# Patient Record
Sex: FEMALE | Race: WHITE | Employment: OTHER | URBAN - METROPOLITAN AREA
[De-identification: names, ages, dates, MRNs, and addresses within clinical notes are randomized per-mention and may not be internally consistent; named-entity substitution may affect disease eponyms.]

---

## 2017-01-09 ENCOUNTER — OFFICE VISIT (OUTPATIENT)
Dept: FAMILY MEDICINE CLINIC | Age: 82
End: 2017-01-09

## 2017-01-09 VITALS
HEART RATE: 87 BPM | DIASTOLIC BLOOD PRESSURE: 68 MMHG | BODY MASS INDEX: 24.3 KG/M2 | SYSTOLIC BLOOD PRESSURE: 120 MMHG | TEMPERATURE: 98.4 F | WEIGHT: 151.2 LBS | RESPIRATION RATE: 18 BRPM | HEIGHT: 66 IN

## 2017-01-09 DIAGNOSIS — E78.2 MIXED HYPERLIPIDEMIA: ICD-10-CM

## 2017-01-09 DIAGNOSIS — R73.9 BLOOD GLUCOSE ELEVATED: ICD-10-CM

## 2017-01-09 DIAGNOSIS — J45.30 MILD PERSISTENT ASTHMA WITHOUT COMPLICATION: ICD-10-CM

## 2017-01-09 DIAGNOSIS — I35.0 AORTIC VALVE STENOSIS, UNSPECIFIED ETIOLOGY: ICD-10-CM

## 2017-01-09 DIAGNOSIS — I10 ESSENTIAL HYPERTENSION: Primary | ICD-10-CM

## 2017-01-09 PROCEDURE — G8420 CALC BMI NORM PARAMETERS: HCPCS | Performed by: FAMILY MEDICINE

## 2017-01-09 PROCEDURE — 1123F ACP DISCUSS/DSCN MKR DOCD: CPT | Performed by: FAMILY MEDICINE

## 2017-01-09 PROCEDURE — G8484 FLU IMMUNIZE NO ADMIN: HCPCS | Performed by: FAMILY MEDICINE

## 2017-01-09 PROCEDURE — 99214 OFFICE O/P EST MOD 30 MIN: CPT | Performed by: FAMILY MEDICINE

## 2017-01-09 PROCEDURE — G8427 DOCREV CUR MEDS BY ELIG CLIN: HCPCS | Performed by: FAMILY MEDICINE

## 2017-01-09 PROCEDURE — 4040F PNEUMOC VAC/ADMIN/RCVD: CPT | Performed by: FAMILY MEDICINE

## 2017-01-09 PROCEDURE — G8399 PT W/DXA RESULTS DOCUMENT: HCPCS | Performed by: FAMILY MEDICINE

## 2017-01-09 PROCEDURE — 1036F TOBACCO NON-USER: CPT | Performed by: FAMILY MEDICINE

## 2017-01-09 PROCEDURE — 1090F PRES/ABSN URINE INCON ASSESS: CPT | Performed by: FAMILY MEDICINE

## 2017-03-23 RX ORDER — DOXAZOSIN 8 MG/1
8 TABLET ORAL NIGHTLY
Qty: 90 TABLET | Refills: 3 | Status: SHIPPED | OUTPATIENT
Start: 2017-03-23 | End: 2018-02-14 | Stop reason: SDUPTHER

## 2017-03-23 RX ORDER — MONTELUKAST SODIUM 10 MG/1
10 TABLET ORAL NIGHTLY
Qty: 90 TABLET | Refills: 3 | Status: SHIPPED | OUTPATIENT
Start: 2017-03-23 | End: 2018-02-13 | Stop reason: SDUPTHER

## 2017-04-10 ENCOUNTER — OFFICE VISIT (OUTPATIENT)
Dept: FAMILY MEDICINE CLINIC | Age: 82
End: 2017-04-10

## 2017-04-10 VITALS
SYSTOLIC BLOOD PRESSURE: 120 MMHG | DIASTOLIC BLOOD PRESSURE: 68 MMHG | TEMPERATURE: 98.7 F | BODY MASS INDEX: 23.63 KG/M2 | HEART RATE: 61 BPM | HEIGHT: 66 IN | WEIGHT: 147 LBS | RESPIRATION RATE: 16 BRPM

## 2017-04-10 DIAGNOSIS — I10 ESSENTIAL HYPERTENSION: Primary | ICD-10-CM

## 2017-04-10 DIAGNOSIS — E78.2 MIXED HYPERLIPIDEMIA: ICD-10-CM

## 2017-04-10 DIAGNOSIS — I35.0 AORTIC VALVE STENOSIS, UNSPECIFIED ETIOLOGY: ICD-10-CM

## 2017-04-10 DIAGNOSIS — J45.30 MILD PERSISTENT ASTHMA WITHOUT COMPLICATION: ICD-10-CM

## 2017-04-10 PROCEDURE — 99214 OFFICE O/P EST MOD 30 MIN: CPT | Performed by: FAMILY MEDICINE

## 2017-04-10 PROCEDURE — 1090F PRES/ABSN URINE INCON ASSESS: CPT | Performed by: FAMILY MEDICINE

## 2017-04-10 PROCEDURE — 1123F ACP DISCUSS/DSCN MKR DOCD: CPT | Performed by: FAMILY MEDICINE

## 2017-04-10 PROCEDURE — 4040F PNEUMOC VAC/ADMIN/RCVD: CPT | Performed by: FAMILY MEDICINE

## 2017-04-10 PROCEDURE — G8427 DOCREV CUR MEDS BY ELIG CLIN: HCPCS | Performed by: FAMILY MEDICINE

## 2017-04-10 PROCEDURE — 1036F TOBACCO NON-USER: CPT | Performed by: FAMILY MEDICINE

## 2017-04-10 PROCEDURE — G8420 CALC BMI NORM PARAMETERS: HCPCS | Performed by: FAMILY MEDICINE

## 2017-04-10 PROCEDURE — G8399 PT W/DXA RESULTS DOCUMENT: HCPCS | Performed by: FAMILY MEDICINE

## 2017-07-05 DIAGNOSIS — I10 ESSENTIAL HYPERTENSION: ICD-10-CM

## 2017-07-05 DIAGNOSIS — I35.0 AORTIC VALVE STENOSIS, UNSPECIFIED ETIOLOGY: ICD-10-CM

## 2017-07-05 DIAGNOSIS — E78.2 MIXED HYPERLIPIDEMIA: ICD-10-CM

## 2017-07-05 DIAGNOSIS — J45.30 MILD PERSISTENT ASTHMA WITHOUT COMPLICATION: ICD-10-CM

## 2017-07-05 LAB
ALBUMIN SERPL-MCNC: 4.3 G/DL (ref 3.9–4.9)
ALP BLD-CCNC: 82 U/L (ref 40–130)
ALT SERPL-CCNC: 13 U/L (ref 0–33)
ANION GAP SERPL CALCULATED.3IONS-SCNC: 17 MEQ/L (ref 7–13)
AST SERPL-CCNC: 19 U/L (ref 0–35)
BILIRUB SERPL-MCNC: 0.2 MG/DL (ref 0–1.2)
BUN BLDV-MCNC: 37 MG/DL (ref 8–23)
CALCIUM SERPL-MCNC: 9 MG/DL (ref 8.6–10.2)
CHLORIDE BLD-SCNC: 104 MEQ/L (ref 98–107)
CHOLESTEROL, TOTAL: 275 MG/DL (ref 0–199)
CO2: 20 MEQ/L (ref 22–29)
CREAT SERPL-MCNC: 1.18 MG/DL (ref 0.5–0.9)
GFR AFRICAN AMERICAN: 53.3
GFR NON-AFRICAN AMERICAN: 44
GLOBULIN: 2.4 G/DL (ref 2.3–3.5)
GLUCOSE BLD-MCNC: 101 MG/DL (ref 74–109)
HDLC SERPL-MCNC: 49 MG/DL (ref 40–59)
LDL CHOLESTEROL CALCULATED: 199 MG/DL (ref 0–129)
POTASSIUM SERPL-SCNC: 4.1 MEQ/L (ref 3.5–5.1)
SODIUM BLD-SCNC: 141 MEQ/L (ref 132–144)
TOTAL PROTEIN: 6.7 G/DL (ref 6.4–8.1)
TRIGL SERPL-MCNC: 136 MG/DL (ref 0–200)

## 2017-07-11 ENCOUNTER — OFFICE VISIT (OUTPATIENT)
Dept: FAMILY MEDICINE CLINIC | Age: 82
End: 2017-07-11

## 2017-07-11 VITALS
RESPIRATION RATE: 14 BRPM | DIASTOLIC BLOOD PRESSURE: 58 MMHG | SYSTOLIC BLOOD PRESSURE: 126 MMHG | BODY MASS INDEX: 23.63 KG/M2 | WEIGHT: 147 LBS | HEIGHT: 66 IN | HEART RATE: 80 BPM

## 2017-07-11 DIAGNOSIS — R73.9 BLOOD GLUCOSE ELEVATED: ICD-10-CM

## 2017-07-11 DIAGNOSIS — J45.30 MILD PERSISTENT ASTHMA WITHOUT COMPLICATION: ICD-10-CM

## 2017-07-11 DIAGNOSIS — I10 ESSENTIAL HYPERTENSION: Primary | ICD-10-CM

## 2017-07-11 DIAGNOSIS — I35.0 AORTIC VALVE STENOSIS, UNSPECIFIED ETIOLOGY: ICD-10-CM

## 2017-07-11 DIAGNOSIS — E78.2 MIXED HYPERLIPIDEMIA: ICD-10-CM

## 2017-07-11 PROCEDURE — G8399 PT W/DXA RESULTS DOCUMENT: HCPCS | Performed by: FAMILY MEDICINE

## 2017-07-11 PROCEDURE — 4040F PNEUMOC VAC/ADMIN/RCVD: CPT | Performed by: FAMILY MEDICINE

## 2017-07-11 PROCEDURE — 1123F ACP DISCUSS/DSCN MKR DOCD: CPT | Performed by: FAMILY MEDICINE

## 2017-07-11 PROCEDURE — G8427 DOCREV CUR MEDS BY ELIG CLIN: HCPCS | Performed by: FAMILY MEDICINE

## 2017-07-11 PROCEDURE — 1090F PRES/ABSN URINE INCON ASSESS: CPT | Performed by: FAMILY MEDICINE

## 2017-07-11 PROCEDURE — G8420 CALC BMI NORM PARAMETERS: HCPCS | Performed by: FAMILY MEDICINE

## 2017-07-11 PROCEDURE — 1036F TOBACCO NON-USER: CPT | Performed by: FAMILY MEDICINE

## 2017-07-11 PROCEDURE — 99214 OFFICE O/P EST MOD 30 MIN: CPT | Performed by: FAMILY MEDICINE

## 2017-07-11 RX ORDER — ATORVASTATIN CALCIUM 20 MG/1
20 TABLET, FILM COATED ORAL DAILY
Qty: 90 TABLET | Refills: 3 | Status: SHIPPED | OUTPATIENT
Start: 2017-07-11 | End: 2017-07-11

## 2017-07-11 RX ORDER — ATORVASTATIN CALCIUM 20 MG/1
20 TABLET, FILM COATED ORAL DAILY
Qty: 90 TABLET | Refills: 3 | Status: SHIPPED | OUTPATIENT
Start: 2017-07-11 | End: 2018-07-17 | Stop reason: SDUPTHER

## 2017-08-10 RX ORDER — VALSARTAN AND HYDROCHLOROTHIAZIDE 80; 12.5 MG/1; MG/1
TABLET, FILM COATED ORAL
Qty: 90 TABLET | Refills: 2 | Status: SHIPPED | OUTPATIENT
Start: 2017-08-10 | End: 2018-04-16 | Stop reason: SDUPTHER

## 2017-10-17 ENCOUNTER — OFFICE VISIT (OUTPATIENT)
Dept: FAMILY MEDICINE CLINIC | Age: 82
End: 2017-10-17

## 2017-10-17 VITALS
SYSTOLIC BLOOD PRESSURE: 128 MMHG | BODY MASS INDEX: 23.46 KG/M2 | DIASTOLIC BLOOD PRESSURE: 60 MMHG | OXYGEN SATURATION: 98 % | WEIGHT: 146 LBS | TEMPERATURE: 97 F | RESPIRATION RATE: 16 BRPM | HEART RATE: 107 BPM | HEIGHT: 66 IN

## 2017-10-17 DIAGNOSIS — I35.0 AORTIC VALVE STENOSIS, ETIOLOGY OF CARDIAC VALVE DISEASE UNSPECIFIED: ICD-10-CM

## 2017-10-17 DIAGNOSIS — E78.2 MIXED HYPERLIPIDEMIA: ICD-10-CM

## 2017-10-17 DIAGNOSIS — I10 ESSENTIAL HYPERTENSION: Primary | ICD-10-CM

## 2017-10-17 DIAGNOSIS — J45.30 MILD PERSISTENT ASTHMA WITHOUT COMPLICATION: ICD-10-CM

## 2017-10-17 DIAGNOSIS — R73.9 BLOOD GLUCOSE ELEVATED: ICD-10-CM

## 2017-10-17 PROCEDURE — 4040F PNEUMOC VAC/ADMIN/RCVD: CPT | Performed by: FAMILY MEDICINE

## 2017-10-17 PROCEDURE — G8427 DOCREV CUR MEDS BY ELIG CLIN: HCPCS | Performed by: FAMILY MEDICINE

## 2017-10-17 PROCEDURE — 1123F ACP DISCUSS/DSCN MKR DOCD: CPT | Performed by: FAMILY MEDICINE

## 2017-10-17 PROCEDURE — G8484 FLU IMMUNIZE NO ADMIN: HCPCS | Performed by: FAMILY MEDICINE

## 2017-10-17 PROCEDURE — 1090F PRES/ABSN URINE INCON ASSESS: CPT | Performed by: FAMILY MEDICINE

## 2017-10-17 PROCEDURE — 99214 OFFICE O/P EST MOD 30 MIN: CPT | Performed by: FAMILY MEDICINE

## 2017-10-17 PROCEDURE — G8399 PT W/DXA RESULTS DOCUMENT: HCPCS | Performed by: FAMILY MEDICINE

## 2017-10-17 PROCEDURE — G8420 CALC BMI NORM PARAMETERS: HCPCS | Performed by: FAMILY MEDICINE

## 2017-10-17 PROCEDURE — 1036F TOBACCO NON-USER: CPT | Performed by: FAMILY MEDICINE

## 2017-10-17 ASSESSMENT — PATIENT HEALTH QUESTIONNAIRE - PHQ9
SUM OF ALL RESPONSES TO PHQ9 QUESTIONS 1 & 2: 0
2. FEELING DOWN, DEPRESSED OR HOPELESS: 0
1. LITTLE INTEREST OR PLEASURE IN DOING THINGS: 0
SUM OF ALL RESPONSES TO PHQ QUESTIONS 1-9: 0

## 2017-10-17 NOTE — PROGRESS NOTES
Chief Complaint   Patient presents with    Hypertension     3 mo f/u    Hyperlipidemia    Asthma   bp controlled now  No cp/sob  Watching diet  mary meds    declines flu shot    Asthma controlled now  No cough    Aortic stenosis stable  No current sx  She has current echo  no other issues    bw reviewed   hi chol  Watch diet  she would agree with resuming meds    Patient again declines Prevnar immunization    Treatment Adherence:   Medication compliance:  compliant all of the time  Diet compliance:  compliant all of the time  Weight trend: decreasing  Current exercise: walks 5 time(s) per week  Barriers: none    Hypertension:  Home blood pressure monitoring: No.  She is adherent to a low sodium diet. Patient denies chest pain, shortness of breath, headache, lightheadedness, blurred vision, peripheral edema, palpitations, dry cough and fatigue. Antihypertensive medication side effects: no medication side effects noted. Use of agents associated with hypertension: none. Sodium (mEq/L)   Date Value   07/05/2017 141    BUN (mg/dL)   Date Value   07/05/2017 37 (H)    Glucose (mg/dL)   Date Value   07/05/2017 101      Potassium (mEq/L)   Date Value   07/05/2017 4.1    CREATININE (mg/dL)   Date Value   07/05/2017 1.18 (H)         Hyperlipidemia:  No new myalgias or GI upset on none.      Lab Results   Component Value Date    CHOL 275 (H) 07/05/2017    TRIG 136 07/05/2017    HDL 49 07/05/2017    LDLCALC 199 (H) 07/05/2017     Lab Results   Component Value Date    ALT 13 07/05/2017    AST 19 07/05/2017          Patient Active Problem List   Diagnosis    Blood glucose elevated    Essential hypertension    Mixed hyperlipidemia    Mild persistent asthma without complication    Aortic valve stenosis       has a current medication list which includes the following prescription(s): valsartan-hydrochlorothiazide, atorvastatin, proair hfa, montelukast, fluticasone-salmeterol, and doxazosin. Past Medical History:   Diagnosis Date    Asthma     Blood glucose elevated     Hyperlipidemia     Hypertension        Past Surgical History:   Procedure Laterality Date    HYSTERECTOMY  1988    complete       [unfilled]    family history includes Cancer in her maternal grandmother; Heart Disease in her brother; High Blood Pressure in her mother. Social History     Social History    Marital status:      Spouse name: N/A    Number of children: N/A    Years of education: N/A     Occupational History    Not on file. Social History Main Topics    Smoking status: Former Smoker    Smokeless tobacco: Never Used    Alcohol use No    Drug use: No    Sexual activity: Not on file     Other Topics Concern    Not on file     Social History Narrative    No narrative on file       Allergies   Allergen Reactions    Capoten [Captopril]     Influenza Vaccines Nausea And Vomiting       Review of Systems - General ROS: negative  Psychological ROS: negative  ENT ROS: negative  Hematological and Lymphatic ROS: negative  Endocrine ROS: negative  Respiratory ROS: no cough, shortness of breath, or wheezing  Cardiovascular ROS: no chest pain or dyspnea on exertion  Gastrointestinal ROS: no abdominal pain, change in bowel habits, or black or bloody stools  Genito-Urinary ROS: no dysuria, trouble voiding, or hematuria  Musculoskeletal ROS: negative  Neurological ROS: no TIA or stroke symptoms  Dermatological ROS: negative    Blood pressure 128/60, pulse 107, temperature 97 °F (36.1 °C), temperature source Temporal, resp. rate 16, height 5' 6\" (1.676 m), weight 146 lb (66.2 kg), SpO2 98 %, not currently breastfeeding.     Physical Examination: General appearance - alert, well appearing, and in no distress  Mental status - alert, oriented to person, place, and time  Eyes - pupils equal and reactive, extraocular eye movements intact  Ears - bilateral TM's and external ear canals normal  Mouth - mucous

## 2017-12-29 NOTE — TELEPHONE ENCOUNTER
Phazackary REQUESTING REFILL. ORDER PENDED.  THANK YOU.    LOV-10/17/2017    Future Appointments  Date Time Provider Sigifredo Foster   1/16/2018 10:45 AM MD Santhosh Rudolph [facility] EMERGENCY [facility] AT [place]

## 2018-01-16 ENCOUNTER — OFFICE VISIT (OUTPATIENT)
Dept: FAMILY MEDICINE CLINIC | Age: 83
End: 2018-01-16

## 2018-01-16 VITALS
SYSTOLIC BLOOD PRESSURE: 118 MMHG | HEART RATE: 118 BPM | TEMPERATURE: 97.6 F | HEIGHT: 68 IN | DIASTOLIC BLOOD PRESSURE: 64 MMHG | OXYGEN SATURATION: 97 % | BODY MASS INDEX: 21.98 KG/M2 | WEIGHT: 145 LBS | RESPIRATION RATE: 16 BRPM

## 2018-01-16 DIAGNOSIS — I10 ESSENTIAL HYPERTENSION: Primary | ICD-10-CM

## 2018-01-16 DIAGNOSIS — R73.9 BLOOD GLUCOSE ELEVATED: ICD-10-CM

## 2018-01-16 DIAGNOSIS — J45.30 MILD PERSISTENT ASTHMA WITHOUT COMPLICATION: ICD-10-CM

## 2018-01-16 DIAGNOSIS — E78.2 MIXED HYPERLIPIDEMIA: ICD-10-CM

## 2018-01-16 PROCEDURE — G8420 CALC BMI NORM PARAMETERS: HCPCS | Performed by: FAMILY MEDICINE

## 2018-01-16 PROCEDURE — G8427 DOCREV CUR MEDS BY ELIG CLIN: HCPCS | Performed by: FAMILY MEDICINE

## 2018-01-16 PROCEDURE — G8484 FLU IMMUNIZE NO ADMIN: HCPCS | Performed by: FAMILY MEDICINE

## 2018-01-16 PROCEDURE — 4040F PNEUMOC VAC/ADMIN/RCVD: CPT | Performed by: FAMILY MEDICINE

## 2018-01-16 PROCEDURE — 1036F TOBACCO NON-USER: CPT | Performed by: FAMILY MEDICINE

## 2018-01-16 PROCEDURE — 99214 OFFICE O/P EST MOD 30 MIN: CPT | Performed by: FAMILY MEDICINE

## 2018-01-16 PROCEDURE — 1123F ACP DISCUSS/DSCN MKR DOCD: CPT | Performed by: FAMILY MEDICINE

## 2018-01-16 PROCEDURE — 1090F PRES/ABSN URINE INCON ASSESS: CPT | Performed by: FAMILY MEDICINE

## 2018-01-16 PROCEDURE — G8399 PT W/DXA RESULTS DOCUMENT: HCPCS | Performed by: FAMILY MEDICINE

## 2018-01-16 NOTE — PROGRESS NOTES
fluticasone-salmeterol, and doxazosin. Past Medical History:   Diagnosis Date    Asthma     Blood glucose elevated     Hyperlipidemia     Hypertension        Past Surgical History:   Procedure Laterality Date    HYSTERECTOMY  1988    complete       [unfilled]    family history includes Cancer in her maternal grandmother; Heart Disease in her brother; High Blood Pressure in her mother. Social History     Social History    Marital status:      Spouse name: N/A    Number of children: N/A    Years of education: N/A     Occupational History    Not on file. Social History Main Topics    Smoking status: Former Smoker    Smokeless tobacco: Never Used    Alcohol use No    Drug use: No    Sexual activity: Not on file     Other Topics Concern    Not on file     Social History Narrative    No narrative on file       Allergies   Allergen Reactions    Capoten [Captopril]     Influenza Vaccines Nausea And Vomiting       Review of Systems - General ROS: negative  Psychological ROS: negative  ENT ROS: negative  Hematological and Lymphatic ROS: negative  Endocrine ROS: negative  Respiratory ROS: no cough, shortness of breath, or wheezing  Cardiovascular ROS: no chest pain or dyspnea on exertion  Gastrointestinal ROS: no abdominal pain, change in bowel habits, or black or bloody stools  Genito-Urinary ROS: no dysuria, trouble voiding, or hematuria  Musculoskeletal ROS: negative  Neurological ROS: no TIA or stroke symptoms  Dermatological ROS: negative    Blood pressure 118/64, pulse 118, temperature 97.6 °F (36.4 °C), temperature source Temporal, resp. rate 16, height 5' 8\" (1.727 m), weight 145 lb (65.8 kg), SpO2 97 %, not currently breastfeeding.     Physical Examination: General appearance - alert, well appearing, and in no distress  Mental status - alert, oriented to person, place, and time  Eyes - pupils equal and reactive, extraocular eye movements intact  Ears - bilateral TM's and external ear canals normal  Mouth - mucous membranes moist, pharynx normal without lesions  Neck - supple, no significant adenopathy  Lymphatics - no palpable lymphadenopathy, no hepatosplenomegaly  Chest - clear to auscultation, no wheezes, rales or rhonchi, symmetric air entry  Heart - normal rate, regular rhythm, normal S1, S2, + KARYN from AS, o/w murmurs, rubs, clicks or gallops  Abdomen - soft, nontender, nondistended, no masses or organomegaly  Neurological - alert, oriented, normal speech, no focal findings or movement disorder noted  Musculoskeletal - no joint tenderness, deformity or swelling  Skin - no rash    Assessment:    1. Essential hypertension     2. Mixed hyperlipidemia     3. Mild persistent asthma without complication     4. Blood glucose elevated         Plan:      No orders of the defined types were placed in this encounter. Outpatient Encounter Prescriptions as of 1/16/2018   Medication Sig Dispense Refill    PROAIR  (90 Base) MCG/ACT inhaler INHALE 2 PUFFS INTO THE LUNGS EVERY 6 HOURS AS NEEDED FOR WHEEZING 25.5 g 1    valsartan-hydrochlorothiazide (DIOVAN-HCT) 80-12.5 MG per tablet TAKE 1 TABLET DAILY 90 tablet 2    atorvastatin (LIPITOR) 20 MG tablet Take 1 tablet by mouth daily 90 tablet 3    montelukast (SINGULAIR) 10 MG tablet Take 1 tablet by mouth nightly 90 tablet 3    fluticasone-salmeterol (ADVAIR DISKUS) 500-50 MCG/DOSE diskus inhaler Inhale 1 puff into the lungs every 12 hours 3 Inhaler 3    doxazosin (CARDURA) 8 MG tablet Take 1 tablet by mouth nightly 90 tablet 3     No facility-administered encounter medications on file as of 1/16/2018. Recommend lifestyle modification. Return in about 3 months (around 4/16/2018). Patient education provided. They understand and agree with this course of treatment. They will return with new or worsening symptoms. Patient instructed to remain current with appropriate annual health maintenance.

## 2018-01-16 NOTE — PATIENT INSTRUCTIONS
Patient Education        High Blood Pressure: Care Instructions  Your Care Instructions    If your blood pressure is usually above 140/90, you have high blood pressure, or hypertension. That means the top number is 140 or higher or the bottom number is 90 or higher, or both. Despite what a lot of people think, high blood pressure usually doesn't cause headaches or make you feel dizzy or lightheaded. It usually has no symptoms. But it does increase your risk for heart attack, stroke, and kidney or eye damage. The higher your blood pressure, the more your risk increases. Your doctor will give you a goal for your blood pressure. Your goal will be based on your health and your age. An example of a goal is to keep your blood pressure below 140/90. Lifestyle changes, such as eating healthy and being active, are always important to help lower blood pressure. You might also take medicine to reach your blood pressure goal.  Follow-up care is a key part of your treatment and safety. Be sure to make and go to all appointments, and call your doctor if you are having problems. It's also a good idea to know your test results and keep a list of the medicines you take. How can you care for yourself at home? Medical treatment  · If you stop taking your medicine, your blood pressure will go back up. You may take one or more types of medicine to lower your blood pressure. Be safe with medicines. Take your medicine exactly as prescribed. Call your doctor if you think you are having a problem with your medicine. · Talk to your doctor before you start taking aspirin every day. Aspirin can help certain people lower their risk of a heart attack or stroke. But taking aspirin isn't right for everyone, because it can cause serious bleeding. · See your doctor regularly. You may need to see the doctor more often at first or until your blood pressure comes down.   · If you are taking blood pressure medicine, talk to your doctor before arms.  ¨ Lightheadedness or sudden weakness. ¨ A fast or irregular heartbeat. ? · You have symptoms of a stroke. These may include:  ¨ Sudden numbness, tingling, weakness, or loss of movement in your face, arm, or leg, especially on only one side of your body. ¨ Sudden vision changes. ¨ Sudden trouble speaking. ¨ Sudden confusion or trouble understanding simple statements. ¨ Sudden problems with walking or balance. ¨ A sudden, severe headache that is different from past headaches. ? · You have severe back or belly pain. ?Do not wait until your blood pressure comes down on its own. Get help right away. ?Call your doctor now or seek immediate care if:  ? · Your blood pressure is much higher than normal (such as 180/110 or higher), but you don't have symptoms. ? · You think high blood pressure is causing symptoms, such as:  ¨ Severe headache. ¨ Blurry vision. ? Watch closely for changes in your health, and be sure to contact your doctor if:  ? · Your blood pressure measures 140/90 or higher at least 2 times. That means the top number is 140 or higher or the bottom number is 90 or higher, or both. ? · You think you may be having side effects from your blood pressure medicine. ? · Your blood pressure is usually normal, but it goes above normal at least 2 times. Where can you learn more? Go to [email or web address]. org and sign in to your [employer] account. Enter A455 in the One[employer] box to learn more about \"High Blood Pressure: Care Instructions. \"     If you do not have an account, please click on the \"Sign Up Now\" link. Current as of: Daina 10, 2017  Content Version: 11.5  © 0744-5778 [employer]. Care instructions adapted under license by [facility] ([facility]).  If you have questions about a medical condition or this instruction, always ask your healthcare professional. Nasir Mixon any warranty or liability for your use of this information.

## 2018-02-15 RX ORDER — MONTELUKAST SODIUM 10 MG/1
10 TABLET ORAL NIGHTLY
Qty: 90 TABLET | Refills: 3 | Status: SHIPPED | OUTPATIENT
Start: 2018-02-15 | End: 2018-12-20 | Stop reason: SDUPTHER

## 2018-02-15 RX ORDER — DOXAZOSIN 8 MG/1
8 TABLET ORAL NIGHTLY
Qty: 90 TABLET | Refills: 3 | Status: SHIPPED | OUTPATIENT
Start: 2018-02-15 | End: 2018-12-31 | Stop reason: SDUPTHER

## 2018-04-16 ENCOUNTER — OFFICE VISIT (OUTPATIENT)
Dept: FAMILY MEDICINE CLINIC | Age: 83
End: 2018-04-16
Payer: MEDICARE

## 2018-04-16 VITALS
RESPIRATION RATE: 16 BRPM | SYSTOLIC BLOOD PRESSURE: 130 MMHG | BODY MASS INDEX: 21.98 KG/M2 | WEIGHT: 145 LBS | HEART RATE: 72 BPM | DIASTOLIC BLOOD PRESSURE: 70 MMHG | TEMPERATURE: 97.8 F | HEIGHT: 68 IN

## 2018-04-16 DIAGNOSIS — J45.30 MILD PERSISTENT ASTHMA WITHOUT COMPLICATION: ICD-10-CM

## 2018-04-16 DIAGNOSIS — E78.2 MIXED HYPERLIPIDEMIA: ICD-10-CM

## 2018-04-16 DIAGNOSIS — I35.0 AORTIC VALVE STENOSIS, ETIOLOGY OF CARDIAC VALVE DISEASE UNSPECIFIED: ICD-10-CM

## 2018-04-16 DIAGNOSIS — R73.9 BLOOD GLUCOSE ELEVATED: ICD-10-CM

## 2018-04-16 DIAGNOSIS — I10 ESSENTIAL HYPERTENSION: Primary | ICD-10-CM

## 2018-04-16 PROCEDURE — G8420 CALC BMI NORM PARAMETERS: HCPCS | Performed by: FAMILY MEDICINE

## 2018-04-16 PROCEDURE — G8427 DOCREV CUR MEDS BY ELIG CLIN: HCPCS | Performed by: FAMILY MEDICINE

## 2018-04-16 PROCEDURE — 1036F TOBACCO NON-USER: CPT | Performed by: FAMILY MEDICINE

## 2018-04-16 PROCEDURE — 4040F PNEUMOC VAC/ADMIN/RCVD: CPT | Performed by: FAMILY MEDICINE

## 2018-04-16 PROCEDURE — 1090F PRES/ABSN URINE INCON ASSESS: CPT | Performed by: FAMILY MEDICINE

## 2018-04-16 PROCEDURE — 1123F ACP DISCUSS/DSCN MKR DOCD: CPT | Performed by: FAMILY MEDICINE

## 2018-04-16 PROCEDURE — G8399 PT W/DXA RESULTS DOCUMENT: HCPCS | Performed by: FAMILY MEDICINE

## 2018-04-16 PROCEDURE — 99214 OFFICE O/P EST MOD 30 MIN: CPT | Performed by: FAMILY MEDICINE

## 2018-04-16 RX ORDER — VALSARTAN AND HYDROCHLOROTHIAZIDE 80; 12.5 MG/1; MG/1
TABLET, FILM COATED ORAL
Qty: 90 TABLET | Refills: 3 | Status: SHIPPED | OUTPATIENT
Start: 2018-04-16

## 2018-07-17 ENCOUNTER — OFFICE VISIT (OUTPATIENT)
Dept: FAMILY MEDICINE CLINIC | Age: 83
End: 2018-07-17
Payer: MEDICARE

## 2018-07-17 VITALS
HEIGHT: 66 IN | DIASTOLIC BLOOD PRESSURE: 64 MMHG | OXYGEN SATURATION: 96 % | TEMPERATURE: 98.2 F | SYSTOLIC BLOOD PRESSURE: 136 MMHG | RESPIRATION RATE: 12 BRPM | HEART RATE: 102 BPM | WEIGHT: 145 LBS | BODY MASS INDEX: 23.3 KG/M2

## 2018-07-17 DIAGNOSIS — I10 ESSENTIAL HYPERTENSION: Primary | ICD-10-CM

## 2018-07-17 DIAGNOSIS — I35.0 AORTIC VALVE STENOSIS, ETIOLOGY OF CARDIAC VALVE DISEASE UNSPECIFIED: ICD-10-CM

## 2018-07-17 DIAGNOSIS — R73.9 BLOOD GLUCOSE ELEVATED: ICD-10-CM

## 2018-07-17 DIAGNOSIS — E78.2 MIXED HYPERLIPIDEMIA: ICD-10-CM

## 2018-07-17 DIAGNOSIS — J45.30 MILD PERSISTENT ASTHMA WITHOUT COMPLICATION: ICD-10-CM

## 2018-07-17 DIAGNOSIS — I10 ESSENTIAL HYPERTENSION: ICD-10-CM

## 2018-07-17 LAB
ALBUMIN SERPL-MCNC: 4.3 G/DL (ref 3.9–4.9)
ALP BLD-CCNC: 87 U/L (ref 40–130)
ALT SERPL-CCNC: 17 U/L (ref 0–33)
ANION GAP SERPL CALCULATED.3IONS-SCNC: 14 MEQ/L (ref 7–13)
AST SERPL-CCNC: 20 U/L (ref 0–35)
BILIRUB SERPL-MCNC: 0.3 MG/DL (ref 0–1.2)
BUN BLDV-MCNC: 32 MG/DL (ref 8–23)
CALCIUM SERPL-MCNC: 9.2 MG/DL (ref 8.6–10.2)
CHLORIDE BLD-SCNC: 107 MEQ/L (ref 98–107)
CHOLESTEROL, TOTAL: 174 MG/DL (ref 0–199)
CO2: 24 MEQ/L (ref 22–29)
CREAT SERPL-MCNC: 1.22 MG/DL (ref 0.5–0.9)
GFR AFRICAN AMERICAN: 51.1
GFR NON-AFRICAN AMERICAN: 42.3
GLOBULIN: 2.4 G/DL (ref 2.3–3.5)
GLUCOSE BLD-MCNC: 107 MG/DL (ref 74–109)
HDLC SERPL-MCNC: 46 MG/DL (ref 40–59)
LDL CHOLESTEROL CALCULATED: 107 MG/DL (ref 0–129)
POTASSIUM SERPL-SCNC: 5.2 MEQ/L (ref 3.5–5.1)
SODIUM BLD-SCNC: 145 MEQ/L (ref 132–144)
TOTAL PROTEIN: 6.7 G/DL (ref 6.4–8.1)
TRIGL SERPL-MCNC: 104 MG/DL (ref 0–200)

## 2018-07-17 PROCEDURE — 1101F PT FALLS ASSESS-DOCD LE1/YR: CPT | Performed by: FAMILY MEDICINE

## 2018-07-17 PROCEDURE — 1090F PRES/ABSN URINE INCON ASSESS: CPT | Performed by: FAMILY MEDICINE

## 2018-07-17 PROCEDURE — 1123F ACP DISCUSS/DSCN MKR DOCD: CPT | Performed by: FAMILY MEDICINE

## 2018-07-17 PROCEDURE — 99214 OFFICE O/P EST MOD 30 MIN: CPT | Performed by: FAMILY MEDICINE

## 2018-07-17 PROCEDURE — G8420 CALC BMI NORM PARAMETERS: HCPCS | Performed by: FAMILY MEDICINE

## 2018-07-17 PROCEDURE — 4040F PNEUMOC VAC/ADMIN/RCVD: CPT | Performed by: FAMILY MEDICINE

## 2018-07-17 PROCEDURE — G8427 DOCREV CUR MEDS BY ELIG CLIN: HCPCS | Performed by: FAMILY MEDICINE

## 2018-07-17 PROCEDURE — 1036F TOBACCO NON-USER: CPT | Performed by: FAMILY MEDICINE

## 2018-07-17 PROCEDURE — G8399 PT W/DXA RESULTS DOCUMENT: HCPCS | Performed by: FAMILY MEDICINE

## 2018-07-17 RX ORDER — ATORVASTATIN CALCIUM 20 MG/1
20 TABLET, FILM COATED ORAL DAILY
Qty: 90 TABLET | Refills: 3 | Status: SHIPPED | OUTPATIENT
Start: 2018-07-17

## 2018-07-17 NOTE — PROGRESS NOTES
Chief Complaint   Patient presents with    Hypertension     3 mo f/u    Medication Refill   bp controlled at this time  No cp/sob  Watching diet  mary meds at this timeWhich has been helpful    Asthma controlled at this time  No cough or shortness of breath reported  No wheezing noted    Aortic stenosis has been stable  No current sx  She has current echo  no other issues  Recommend keep cardiology evaluation, patient agrees and has been compliant with this    bw reviewed at this time  hi chol  Watch diet  she would agree with continuing meds    Treatment Adherence:   Medication compliance:  compliant all of the time  Diet compliance:  compliant all of the time  Weight trend: decreasing  Current exercise: walks 5 time(s) per week  Barriers: none    Hypertension:  Home blood pressure monitoring: No.  She is adherent to a low sodium diet. Patient denies chest pain, shortness of breath, headache, lightheadedness, blurred vision, peripheral edema, palpitations, dry cough and fatigue. Antihypertensive medication side effects: no medication side effects noted. Use of agents associated with hypertension: none. Sodium (mEq/L)   Date Value   07/17/2018 145 (H)    BUN (mg/dL)   Date Value   07/17/2018 32 (H)    Glucose (mg/dL)   Date Value   07/17/2018 107      Potassium (mEq/L)   Date Value   07/17/2018 5.2 (H)    CREATININE (mg/dL)   Date Value   07/17/2018 1.22 (H)         Hyperlipidemia:  No new myalgias or GI upset on none.      Lab Results   Component Value Date    CHOL 174 07/17/2018    TRIG 104 07/17/2018    HDL 46 07/17/2018    LDLCALC 107 07/17/2018     Lab Results   Component Value Date    ALT 17 07/17/2018    AST 20 07/17/2018          Patient Active Problem List   Diagnosis    Blood glucose elevated    Essential hypertension    Mixed hyperlipidemia    Mild persistent asthma without complication    Aortic valve stenosis       has a current medication list which includes the following prescription(s): atorvastatin, valsartan-hydrochlorothiazide, montelukast, fluticasone-salmeterol, doxazosin, and proair hfa. Past Medical History:   Diagnosis Date    Asthma     Blood glucose elevated     Hyperlipidemia     Hypertension        Past Surgical History:   Procedure Laterality Date    HYSTERECTOMY  1988    complete       [unfilled]    family history includes Cancer in her maternal grandmother; Heart Disease in her brother; High Blood Pressure in her mother. Social History     Social History    Marital status:      Spouse name: N/A    Number of children: N/A    Years of education: N/A     Occupational History    Not on file. Social History Main Topics    Smoking status: Former Smoker    Smokeless tobacco: Never Used    Alcohol use No    Drug use: No    Sexual activity: Not on file     Other Topics Concern    Not on file     Social History Narrative    No narrative on file       Allergies   Allergen Reactions    Capoten [Captopril]     Influenza Vaccines Nausea And Vomiting       Review of Systems - General ROS: negative  Psychological ROS: negative  ENT ROS: negative  Hematological and Lymphatic ROS: negative  Endocrine ROS: negative  Respiratory ROS: no cough, shortness of breath, or wheezing  Cardiovascular ROS: no chest pain or dyspnea on exertion  Gastrointestinal ROS: no abdominal pain, change in bowel habits, or black or bloody stools  Genito-Urinary ROS: no dysuria, trouble voiding, or hematuria  Musculoskeletal ROS: negative  Neurological ROS: no TIA or stroke symptoms  Dermatological ROS: negative    Blood pressure 136/64, pulse 102, temperature 98.2 °F (36.8 °C), temperature source Temporal, resp. rate 12, height 5' 6\" (1.676 m), weight 145 lb (65.8 kg), SpO2 96 %, not currently breastfeeding.     Physical Examination: General appearance - alert, well appearing, and in no distress  Mental status - alert, oriented to person, place, and

## 2018-10-26 ENCOUNTER — OFFICE VISIT (OUTPATIENT)
Dept: FAMILY MEDICINE CLINIC | Age: 83
End: 2018-10-26
Payer: MEDICARE

## 2018-10-26 VITALS
SYSTOLIC BLOOD PRESSURE: 120 MMHG | HEIGHT: 67 IN | TEMPERATURE: 97.8 F | RESPIRATION RATE: 14 BRPM | HEART RATE: 87 BPM | OXYGEN SATURATION: 99 % | DIASTOLIC BLOOD PRESSURE: 58 MMHG | BODY MASS INDEX: 22 KG/M2 | WEIGHT: 140.2 LBS

## 2018-10-26 DIAGNOSIS — R73.9 BLOOD GLUCOSE ELEVATED: ICD-10-CM

## 2018-10-26 DIAGNOSIS — E78.2 MIXED HYPERLIPIDEMIA: ICD-10-CM

## 2018-10-26 DIAGNOSIS — N18.30 CHRONIC KIDNEY DISEASE, STAGE III (MODERATE) (HCC): ICD-10-CM

## 2018-10-26 DIAGNOSIS — I35.0 AORTIC VALVE STENOSIS, ETIOLOGY OF CARDIAC VALVE DISEASE UNSPECIFIED: ICD-10-CM

## 2018-10-26 DIAGNOSIS — J45.30 MILD PERSISTENT ASTHMA WITHOUT COMPLICATION: ICD-10-CM

## 2018-10-26 DIAGNOSIS — I10 ESSENTIAL HYPERTENSION: Primary | ICD-10-CM

## 2018-10-26 PROCEDURE — G8399 PT W/DXA RESULTS DOCUMENT: HCPCS | Performed by: FAMILY MEDICINE

## 2018-10-26 PROCEDURE — 1090F PRES/ABSN URINE INCON ASSESS: CPT | Performed by: FAMILY MEDICINE

## 2018-10-26 PROCEDURE — 99214 OFFICE O/P EST MOD 30 MIN: CPT | Performed by: FAMILY MEDICINE

## 2018-10-26 PROCEDURE — G8420 CALC BMI NORM PARAMETERS: HCPCS | Performed by: FAMILY MEDICINE

## 2018-10-26 PROCEDURE — 1036F TOBACCO NON-USER: CPT | Performed by: FAMILY MEDICINE

## 2018-10-26 PROCEDURE — 1101F PT FALLS ASSESS-DOCD LE1/YR: CPT | Performed by: FAMILY MEDICINE

## 2018-10-26 PROCEDURE — 1123F ACP DISCUSS/DSCN MKR DOCD: CPT | Performed by: FAMILY MEDICINE

## 2018-10-26 PROCEDURE — G8427 DOCREV CUR MEDS BY ELIG CLIN: HCPCS | Performed by: FAMILY MEDICINE

## 2018-10-26 PROCEDURE — 4040F PNEUMOC VAC/ADMIN/RCVD: CPT | Performed by: FAMILY MEDICINE

## 2018-10-26 PROCEDURE — G8484 FLU IMMUNIZE NO ADMIN: HCPCS | Performed by: FAMILY MEDICINE

## 2018-10-26 ASSESSMENT — PATIENT HEALTH QUESTIONNAIRE - PHQ9
SUM OF ALL RESPONSES TO PHQ QUESTIONS 1-9: 0
SUM OF ALL RESPONSES TO PHQ QUESTIONS 1-9: 0
SUM OF ALL RESPONSES TO PHQ9 QUESTIONS 1 & 2: 0
2. FEELING DOWN, DEPRESSED OR HOPELESS: 0
1. LITTLE INTEREST OR PLEASURE IN DOING THINGS: 0

## 2018-10-26 NOTE — PROGRESS NOTES
stenosis       has a current medication list which includes the following prescription(s): atorvastatin, valsartan-hydrochlorothiazide, montelukast, fluticasone-salmeterol, doxazosin, and proair hfa. Past Medical History:   Diagnosis Date    Asthma     Blood glucose elevated     Hyperlipidemia     Hypertension        Past Surgical History:   Procedure Laterality Date    HYSTERECTOMY  1988    complete       [unfilled]    family history includes Cancer in her maternal grandmother; Heart Disease in her brother; High Blood Pressure in her mother. Social History     Social History    Marital status:      Spouse name: N/A    Number of children: N/A    Years of education: N/A     Occupational History    Not on file. Social History Main Topics    Smoking status: Former Smoker    Smokeless tobacco: Never Used    Alcohol use No    Drug use: No    Sexual activity: Not on file     Other Topics Concern    Not on file     Social History Narrative    No narrative on file       Allergies   Allergen Reactions    Capoten [Captopril]     Influenza Vaccines Nausea And Vomiting       Review of Systems - General ROS: negative  Psychological ROS: negative  ENT ROS: negative  Hematological and Lymphatic ROS: negative  Endocrine ROS: negative  Respiratory ROS: no cough, shortness of breath, or wheezing  Cardiovascular ROS: no chest pain or dyspnea on exertion  Gastrointestinal ROS: no abdominal pain, change in bowel habits, or black or bloody stools  Genito-Urinary ROS: no dysuria, trouble voiding, or hematuria  Musculoskeletal ROS: negative  Neurological ROS: no TIA or stroke symptoms  Dermatological ROS: negative    Blood pressure (!) 120/58, pulse 87, temperature 97.8 °F (36.6 °C), temperature source Temporal, resp. rate 14, height 5' 6.5\" (1.689 m), weight 140 lb 3.2 oz (63.6 kg), SpO2 99 %, not currently breastfeeding.     Physical Examination: General appearance - alert, well appearing, and in no

## 2018-12-20 RX ORDER — MONTELUKAST SODIUM 10 MG/1
10 TABLET ORAL NIGHTLY
Qty: 90 TABLET | Refills: 3 | Status: SHIPPED | OUTPATIENT
Start: 2018-12-20

## 2018-12-31 RX ORDER — DOXAZOSIN 8 MG/1
8 TABLET ORAL NIGHTLY
Qty: 90 TABLET | Refills: 3 | Status: SHIPPED | OUTPATIENT
Start: 2018-12-31

## 2019-01-28 ENCOUNTER — OFFICE VISIT (OUTPATIENT)
Dept: FAMILY MEDICINE CLINIC | Age: 84
End: 2019-01-28
Payer: MEDICARE

## 2019-01-28 VITALS
HEART RATE: 92 BPM | SYSTOLIC BLOOD PRESSURE: 122 MMHG | HEIGHT: 67 IN | DIASTOLIC BLOOD PRESSURE: 66 MMHG | BODY MASS INDEX: 21.5 KG/M2 | TEMPERATURE: 97.6 F | RESPIRATION RATE: 16 BRPM | WEIGHT: 137 LBS

## 2019-01-28 DIAGNOSIS — N18.30 CHRONIC KIDNEY DISEASE, STAGE III (MODERATE) (HCC): ICD-10-CM

## 2019-01-28 DIAGNOSIS — I10 ESSENTIAL HYPERTENSION: Primary | ICD-10-CM

## 2019-01-28 DIAGNOSIS — E78.2 MIXED HYPERLIPIDEMIA: ICD-10-CM

## 2019-01-28 DIAGNOSIS — J45.30 MILD PERSISTENT ASTHMA WITHOUT COMPLICATION: ICD-10-CM

## 2019-01-28 DIAGNOSIS — I35.0 AORTIC VALVE STENOSIS, ETIOLOGY OF CARDIAC VALVE DISEASE UNSPECIFIED: ICD-10-CM

## 2019-01-28 DIAGNOSIS — R73.9 BLOOD GLUCOSE ELEVATED: ICD-10-CM

## 2019-01-28 PROCEDURE — 1090F PRES/ABSN URINE INCON ASSESS: CPT | Performed by: FAMILY MEDICINE

## 2019-01-28 PROCEDURE — G8484 FLU IMMUNIZE NO ADMIN: HCPCS | Performed by: FAMILY MEDICINE

## 2019-01-28 PROCEDURE — 1036F TOBACCO NON-USER: CPT | Performed by: FAMILY MEDICINE

## 2019-01-28 PROCEDURE — G8420 CALC BMI NORM PARAMETERS: HCPCS | Performed by: FAMILY MEDICINE

## 2019-01-28 PROCEDURE — 1123F ACP DISCUSS/DSCN MKR DOCD: CPT | Performed by: FAMILY MEDICINE

## 2019-01-28 PROCEDURE — G8399 PT W/DXA RESULTS DOCUMENT: HCPCS | Performed by: FAMILY MEDICINE

## 2019-01-28 PROCEDURE — 4040F PNEUMOC VAC/ADMIN/RCVD: CPT | Performed by: FAMILY MEDICINE

## 2019-01-28 PROCEDURE — G8427 DOCREV CUR MEDS BY ELIG CLIN: HCPCS | Performed by: FAMILY MEDICINE

## 2019-01-28 PROCEDURE — 1101F PT FALLS ASSESS-DOCD LE1/YR: CPT | Performed by: FAMILY MEDICINE

## 2019-01-28 PROCEDURE — 99214 OFFICE O/P EST MOD 30 MIN: CPT | Performed by: FAMILY MEDICINE

## 2019-02-23 ENCOUNTER — TELEPHONE (OUTPATIENT)
Dept: FAMILY MEDICINE CLINIC | Age: 84
End: 2019-02-23

## 2021-03-23 LAB — SURGICAL PATHOLOGY REPORT: NORMAL

## 2023-02-16 PROBLEM — E78.5 HYPERLIPIDEMIA: Status: ACTIVE | Noted: 2023-02-16

## 2023-02-16 PROBLEM — R41.3 POOR MEMORY: Status: ACTIVE | Noted: 2023-02-16

## 2023-02-16 PROBLEM — N18.32 CKD STAGE G3B/A2, GFR 30-44 AND ALBUMIN CREATININE RATIO 30-299 MG/G (MULTI): Status: ACTIVE | Noted: 2023-02-16

## 2023-02-16 PROBLEM — J45.909 ASTHMA (HHS-HCC): Status: ACTIVE | Noted: 2023-02-16

## 2023-02-16 PROBLEM — I95.9 HYPOTENSION: Status: ACTIVE | Noted: 2023-02-16

## 2023-02-16 PROBLEM — D64.9 ANEMIA: Status: ACTIVE | Noted: 2023-02-16

## 2023-02-16 PROBLEM — R63.4 WEIGHT LOSS: Status: ACTIVE | Noted: 2023-02-16

## 2023-02-16 PROBLEM — I34.0 SEVERE MITRAL REGURGITATION: Status: ACTIVE | Noted: 2023-02-16

## 2023-02-16 PROBLEM — R19.7 DIARRHEA: Status: ACTIVE | Noted: 2023-02-16

## 2023-02-16 PROBLEM — Z95.2 S/P TAVR (TRANSCATHETER AORTIC VALVE REPLACEMENT): Status: ACTIVE | Noted: 2023-02-16

## 2023-02-16 PROBLEM — M19.90 DJD (DEGENERATIVE JOINT DISEASE): Status: ACTIVE | Noted: 2023-02-16

## 2023-02-16 PROBLEM — I25.10 CAD (CORONARY ARTERY DISEASE): Status: ACTIVE | Noted: 2023-02-16

## 2023-02-16 PROBLEM — I10 HTN (HYPERTENSION): Status: ACTIVE | Noted: 2023-02-16

## 2023-02-16 PROBLEM — N95.2 ATROPHY OF VAGINA: Status: ACTIVE | Noted: 2023-02-16

## 2023-02-16 PROBLEM — N13.30 HYDRONEPHROSIS: Status: ACTIVE | Noted: 2023-02-16

## 2023-02-16 PROBLEM — R19.5 LOOSE STOOLS: Status: ACTIVE | Noted: 2023-02-16

## 2023-02-16 PROBLEM — N81.10 FEMALE BLADDER PROLAPSE: Status: ACTIVE | Noted: 2023-02-16

## 2023-02-16 PROBLEM — R10.9 ABDOMINAL PAIN: Status: ACTIVE | Noted: 2023-02-16

## 2023-02-16 PROBLEM — I65.23 BILATERAL CAROTID ARTERY STENOSIS: Status: ACTIVE | Noted: 2023-02-16

## 2023-02-16 RX ORDER — VALSARTAN AND HYDROCHLOROTHIAZIDE 80; 12.5 MG/1; MG/1
0.5 TABLET, FILM COATED ORAL DAILY
COMMUNITY
Start: 2020-06-04 | End: 2023-05-02

## 2023-02-16 RX ORDER — ASPIRIN 81 MG/1
81 TABLET ORAL DAILY
COMMUNITY
Start: 2021-06-14

## 2023-02-16 RX ORDER — MONTELUKAST SODIUM 10 MG/1
10 TABLET ORAL DAILY
COMMUNITY
Start: 2020-11-30 | End: 2023-10-23

## 2023-02-16 RX ORDER — ATORVASTATIN CALCIUM 20 MG/1
20 TABLET, FILM COATED ORAL NIGHTLY
COMMUNITY
Start: 2022-02-09 | End: 2024-04-23 | Stop reason: SDUPTHER

## 2023-03-01 LAB
ALANINE AMINOTRANSFERASE (SGPT) (U/L) IN SER/PLAS: 18 U/L (ref 7–45)
ALBUMIN (G/DL) IN SER/PLAS: 4 G/DL (ref 3.4–5)
ALKALINE PHOSPHATASE (U/L) IN SER/PLAS: 77 U/L (ref 33–136)
ANION GAP IN SER/PLAS: 12 MMOL/L (ref 10–20)
ASPARTATE AMINOTRANSFERASE (SGOT) (U/L) IN SER/PLAS: 19 U/L (ref 9–39)
BASOPHILS (10*3/UL) IN BLOOD BY AUTOMATED COUNT: 0.02 X10E9/L (ref 0–0.1)
BASOPHILS/100 LEUKOCYTES IN BLOOD BY AUTOMATED COUNT: 0.4 % (ref 0–2)
BILIRUBIN TOTAL (MG/DL) IN SER/PLAS: 0.4 MG/DL (ref 0–1.2)
CALCIUM (MG/DL) IN SER/PLAS: 9.5 MG/DL (ref 8.6–10.3)
CARBON DIOXIDE, TOTAL (MMOL/L) IN SER/PLAS: 26 MMOL/L (ref 21–32)
CHLORIDE (MMOL/L) IN SER/PLAS: 110 MMOL/L (ref 98–107)
CHOLESTEROL (MG/DL) IN SER/PLAS: 149 MG/DL (ref 0–199)
CHOLESTEROL IN HDL (MG/DL) IN SER/PLAS: 38.7 MG/DL
CHOLESTEROL/HDL RATIO: 3.9
CREATININE (MG/DL) IN SER/PLAS: 1.78 MG/DL (ref 0.5–1.05)
EOSINOPHILS (10*3/UL) IN BLOOD BY AUTOMATED COUNT: 0.14 X10E9/L (ref 0–0.4)
EOSINOPHILS/100 LEUKOCYTES IN BLOOD BY AUTOMATED COUNT: 2.5 % (ref 0–6)
ERYTHROCYTE DISTRIBUTION WIDTH (RATIO) BY AUTOMATED COUNT: 13.6 % (ref 11.5–14.5)
ERYTHROCYTE MEAN CORPUSCULAR HEMOGLOBIN CONCENTRATION (G/DL) BY AUTOMATED: 30.5 G/DL (ref 32–36)
ERYTHROCYTE MEAN CORPUSCULAR VOLUME (FL) BY AUTOMATED COUNT: 108 FL (ref 80–100)
ERYTHROCYTES (10*6/UL) IN BLOOD BY AUTOMATED COUNT: 2.4 X10E12/L (ref 4–5.2)
GFR FEMALE: 27 ML/MIN/1.73M2
GLUCOSE (MG/DL) IN SER/PLAS: 121 MG/DL (ref 74–99)
HEMATOCRIT (%) IN BLOOD BY AUTOMATED COUNT: 25.9 % (ref 36–46)
HEMOGLOBIN (G/DL) IN BLOOD: 7.9 G/DL (ref 12–16)
IMMATURE GRANULOCYTES/100 LEUKOCYTES IN BLOOD BY AUTOMATED COUNT: 0.5 % (ref 0–0.9)
LDL: 85 MG/DL (ref 0–99)
LEUKOCYTES (10*3/UL) IN BLOOD BY AUTOMATED COUNT: 5.7 X10E9/L (ref 4.4–11.3)
LYMPHOCYTES (10*3/UL) IN BLOOD BY AUTOMATED COUNT: 0.97 X10E9/L (ref 0.8–3)
LYMPHOCYTES/100 LEUKOCYTES IN BLOOD BY AUTOMATED COUNT: 17 % (ref 13–44)
MONOCYTES (10*3/UL) IN BLOOD BY AUTOMATED COUNT: 0.42 X10E9/L (ref 0.05–0.8)
MONOCYTES/100 LEUKOCYTES IN BLOOD BY AUTOMATED COUNT: 7.4 % (ref 2–10)
NEUTROPHILS (10*3/UL) IN BLOOD BY AUTOMATED COUNT: 4.11 X10E9/L (ref 1.6–5.5)
NEUTROPHILS/100 LEUKOCYTES IN BLOOD BY AUTOMATED COUNT: 72.2 % (ref 40–80)
PLATELETS (10*3/UL) IN BLOOD AUTOMATED COUNT: 166 X10E9/L (ref 150–450)
POTASSIUM (MMOL/L) IN SER/PLAS: 4.3 MMOL/L (ref 3.5–5.3)
PROTEIN TOTAL: 6.9 G/DL (ref 6.4–8.2)
SODIUM (MMOL/L) IN SER/PLAS: 144 MMOL/L (ref 136–145)
TRIGLYCERIDE (MG/DL) IN SER/PLAS: 128 MG/DL (ref 0–149)
UREA NITROGEN (MG/DL) IN SER/PLAS: 42 MG/DL (ref 6–23)
VLDL: 26 MG/DL (ref 0–40)

## 2023-03-02 RX ORDER — FLUTICASONE PROPIONATE AND SALMETEROL 500; 50 UG/1; UG/1
1 POWDER RESPIRATORY (INHALATION) 2 TIMES DAILY
COMMUNITY
End: 2023-09-25

## 2023-03-30 ENCOUNTER — OFFICE VISIT (OUTPATIENT)
Dept: PRIMARY CARE | Facility: CLINIC | Age: 88
End: 2023-03-30
Payer: MEDICARE

## 2023-03-30 VITALS
TEMPERATURE: 98 F | SYSTOLIC BLOOD PRESSURE: 116 MMHG | HEIGHT: 65 IN | DIASTOLIC BLOOD PRESSURE: 60 MMHG | BODY MASS INDEX: 19.33 KG/M2 | HEART RATE: 108 BPM | WEIGHT: 116 LBS | OXYGEN SATURATION: 96 %

## 2023-03-30 DIAGNOSIS — Z95.2 S/P TAVR (TRANSCATHETER AORTIC VALVE REPLACEMENT): ICD-10-CM

## 2023-03-30 DIAGNOSIS — N18.32 CKD STAGE G3B/A2, GFR 30-44 AND ALBUMIN CREATININE RATIO 30-299 MG/G (MULTI): ICD-10-CM

## 2023-03-30 DIAGNOSIS — D50.8 IRON DEFICIENCY ANEMIA SECONDARY TO INADEQUATE DIETARY IRON INTAKE: ICD-10-CM

## 2023-03-30 DIAGNOSIS — I34.0 SEVERE MITRAL REGURGITATION: ICD-10-CM

## 2023-03-30 DIAGNOSIS — I65.23 BILATERAL CAROTID ARTERY STENOSIS: ICD-10-CM

## 2023-03-30 DIAGNOSIS — I25.10 CORONARY ARTERY DISEASE INVOLVING NATIVE CORONARY ARTERY OF NATIVE HEART WITHOUT ANGINA PECTORIS: ICD-10-CM

## 2023-03-30 DIAGNOSIS — J45.30 MILD PERSISTENT ASTHMA WITHOUT COMPLICATION (HHS-HCC): ICD-10-CM

## 2023-03-30 DIAGNOSIS — E78.2 MIXED HYPERLIPIDEMIA: Primary | ICD-10-CM

## 2023-03-30 DIAGNOSIS — I35.0 NONRHEUMATIC AORTIC VALVE STENOSIS: ICD-10-CM

## 2023-03-30 DIAGNOSIS — M15.9 PRIMARY OSTEOARTHRITIS INVOLVING MULTIPLE JOINTS: ICD-10-CM

## 2023-03-30 DIAGNOSIS — I10 ESSENTIAL HYPERTENSION: ICD-10-CM

## 2023-03-30 PROBLEM — R73.9 BLOOD GLUCOSE ELEVATED: Status: ACTIVE | Noted: 2023-03-30

## 2023-03-30 PROBLEM — I95.9 HYPOTENSION: Status: RESOLVED | Noted: 2023-02-16 | Resolved: 2023-03-30

## 2023-03-30 PROBLEM — R19.7 DIARRHEA: Status: RESOLVED | Noted: 2023-02-16 | Resolved: 2023-03-30

## 2023-03-30 PROBLEM — R19.5 LOOSE STOOLS: Status: RESOLVED | Noted: 2023-02-16 | Resolved: 2023-03-30

## 2023-03-30 PROBLEM — R10.9 ABDOMINAL PAIN: Status: RESOLVED | Noted: 2023-02-16 | Resolved: 2023-03-30

## 2023-03-30 PROBLEM — J45.909 ASTHMA (HHS-HCC): Status: RESOLVED | Noted: 2023-02-16 | Resolved: 2023-03-30

## 2023-03-30 PROBLEM — M15.0 PRIMARY OSTEOARTHRITIS INVOLVING MULTIPLE JOINTS: Status: ACTIVE | Noted: 2023-02-16

## 2023-03-30 PROCEDURE — 3078F DIAST BP <80 MM HG: CPT | Performed by: FAMILY MEDICINE

## 2023-03-30 PROCEDURE — 1159F MED LIST DOCD IN RCRD: CPT | Performed by: FAMILY MEDICINE

## 2023-03-30 PROCEDURE — 3074F SYST BP LT 130 MM HG: CPT | Performed by: FAMILY MEDICINE

## 2023-03-30 PROCEDURE — 1160F RVW MEDS BY RX/DR IN RCRD: CPT | Performed by: FAMILY MEDICINE

## 2023-03-30 PROCEDURE — 99214 OFFICE O/P EST MOD 30 MIN: CPT | Performed by: FAMILY MEDICINE

## 2023-03-30 PROCEDURE — 1036F TOBACCO NON-USER: CPT | Performed by: FAMILY MEDICINE

## 2023-03-30 ASSESSMENT — ENCOUNTER SYMPTOMS
VOMITING: 0
BLOOD IN STOOL: 0
DIFFICULTY URINATING: 0
SHORTNESS OF BREATH: 0
WEAKNESS: 0
MYALGIAS: 0
LOSS OF SENSATION IN FEET: 0
DYSPHORIC MOOD: 0
DIZZINESS: 0
EYE DISCHARGE: 0
DIARRHEA: 0
HEMATURIA: 0
DEPRESSION: 0
CHEST TIGHTNESS: 0
NERVOUS/ANXIOUS: 0
ARTHRALGIAS: 0
COUGH: 0
ACTIVITY CHANGE: 0
CONSTIPATION: 0
NECK PAIN: 0
FATIGUE: 0
BACK PAIN: 0
BRUISES/BLEEDS EASILY: 0
ABDOMINAL PAIN: 0
OCCASIONAL FEELINGS OF UNSTEADINESS: 0
SORE THROAT: 0
NAUSEA: 0
HEADACHES: 0
NUMBNESS: 0
ADENOPATHY: 0

## 2023-03-30 NOTE — PROGRESS NOTES
Subjective   Patient ID: Bora Moreno is a 88 y.o. female who presents for 3 month follow up on Hyperlipidemia and Hypertension.        HPI  Patient here for checkup visit    Patient here today with her spouse    Positive anemia  No bleeding  It was a little worse the last blood count so he is seeing hematology again would be reasonable  Suggest iron supplement    Mitral regurg stable seen by cardio    Aortic stenosis stable status post replacement    Asthma stable no chest pain or shortness of breath    Carotid stenosis stable recommend lifestyle modification    Coronary disease stable  No angina  See cardio    Hypertension stable  No chest pain or shortness of breath    Chronic kidney disease stable  Stay current with proper lifestyle modification and monitor kidney function and encourage fluid intake    High cholesterol stable watch diet    Osteoarthritis of multiple joints  This is relatively stable no new issues reported     Review of Systems   Constitutional:  Negative for activity change and fatigue.   HENT:  Negative for congestion and sore throat.    Eyes:  Negative for discharge.   Respiratory:  Negative for cough, chest tightness and shortness of breath.    Cardiovascular:  Negative for chest pain and leg swelling.   Gastrointestinal:  Negative for abdominal pain, blood in stool, constipation, diarrhea, nausea and vomiting.   Endocrine: Negative for cold intolerance and heat intolerance.   Genitourinary:  Negative for difficulty urinating and hematuria.   Musculoskeletal:  Negative for arthralgias, back pain, gait problem, myalgias and neck pain.   Allergic/Immunologic: Negative for environmental allergies.   Neurological:  Negative for dizziness, syncope, weakness, numbness and headaches.   Hematological:  Negative for adenopathy. Does not bruise/bleed easily.   Psychiatric/Behavioral:  Negative for dysphoric mood. The patient is not nervous/anxious.    All other systems reviewed and are  "negative.      Objective   /60 (BP Location: Left arm, BP Cuff Size: Small adult)   Pulse 108   Temp 36.7 °C (98 °F)   Ht 1.638 m (5' 4.5\")   Wt 52.6 kg (116 lb)   SpO2 96%   BMI 19.60 kg/m²    Physical Exam  Vitals and nursing note reviewed.   Constitutional:       General: She is not in acute distress.     Appearance: Normal appearance.   HENT:      Head: Normocephalic and atraumatic.      Right Ear: Tympanic membrane, ear canal and external ear normal.      Left Ear: Tympanic membrane, ear canal and external ear normal.      Nose: Nose normal.      Mouth/Throat:      Mouth: Mucous membranes are moist.      Pharynx: Oropharynx is clear. No oropharyngeal exudate or posterior oropharyngeal erythema.   Eyes:      Extraocular Movements: Extraocular movements intact.      Conjunctiva/sclera: Conjunctivae normal.      Pupils: Pupils are equal, round, and reactive to light.   Cardiovascular:      Rate and Rhythm: Normal rate and regular rhythm.      Pulses: Normal pulses.      Heart sounds: Normal heart sounds. No murmur heard.  Pulmonary:      Effort: Pulmonary effort is normal. No respiratory distress.      Breath sounds: Normal breath sounds. No wheezing or rales.   Abdominal:      General: Abdomen is flat. Bowel sounds are normal. There is no distension.      Palpations: Abdomen is soft. There is no mass.      Tenderness: There is no abdominal tenderness.   Musculoskeletal:         General: No swelling or deformity. Normal range of motion.      Cervical back: Normal range of motion and neck supple.      Right lower leg: No edema.      Left lower leg: No edema.   Lymphadenopathy:      Cervical: No cervical adenopathy.   Skin:     General: Skin is warm and dry.      Capillary Refill: Capillary refill takes less than 2 seconds.      Findings: No lesion or rash.   Neurological:      General: No focal deficit present.      Mental Status: She is alert and oriented to person, place, and time.      Cranial " Nerves: No cranial nerve deficit.      Motor: No weakness.   Psychiatric:         Mood and Affect: Mood normal.         Behavior: Behavior normal.         Thought Content: Thought content normal.         Judgment: Judgment normal.         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  Stay current with age appropriate health maintenance as instructed.  Appointment here or ER with new or worsening symptoms'  Keep appropriate follow-up visit.  Stay current with proper immunizations   3-month recheck  Discussed with spouse  See hematology  Stay current with proper blood work  Continue current medicines as listed

## 2023-04-05 ENCOUNTER — OFFICE VISIT (OUTPATIENT)
Dept: PRIMARY CARE | Facility: CLINIC | Age: 88
End: 2023-04-05
Payer: MEDICARE

## 2023-04-05 VITALS
BODY MASS INDEX: 18.8 KG/M2 | DIASTOLIC BLOOD PRESSURE: 60 MMHG | TEMPERATURE: 97.8 F | HEIGHT: 66 IN | HEART RATE: 94 BPM | OXYGEN SATURATION: 94 % | WEIGHT: 117 LBS | RESPIRATION RATE: 16 BRPM | SYSTOLIC BLOOD PRESSURE: 140 MMHG

## 2023-04-05 DIAGNOSIS — S51.811S SKIN TEAR OF FOREARM WITHOUT COMPLICATION, RIGHT, SEQUELA: Primary | ICD-10-CM

## 2023-04-05 PROCEDURE — 99213 OFFICE O/P EST LOW 20 MIN: CPT | Performed by: NURSE PRACTITIONER

## 2023-04-05 PROCEDURE — 1159F MED LIST DOCD IN RCRD: CPT | Performed by: NURSE PRACTITIONER

## 2023-04-05 PROCEDURE — 1160F RVW MEDS BY RX/DR IN RCRD: CPT | Performed by: NURSE PRACTITIONER

## 2023-04-05 PROCEDURE — 3078F DIAST BP <80 MM HG: CPT | Performed by: NURSE PRACTITIONER

## 2023-04-05 PROCEDURE — 1036F TOBACCO NON-USER: CPT | Performed by: NURSE PRACTITIONER

## 2023-04-05 PROCEDURE — 3077F SYST BP >= 140 MM HG: CPT | Performed by: NURSE PRACTITIONER

## 2023-04-05 ASSESSMENT — ENCOUNTER SYMPTOMS
HEMATOLOGIC/LYMPHATIC NEGATIVE: 1
LOSS OF SENSATION IN FEET: 0
RESPIRATORY NEGATIVE: 1
ENDOCRINE NEGATIVE: 1
CONSTITUTIONAL NEGATIVE: 1
OCCASIONAL FEELINGS OF UNSTEADINESS: 1
MUSCULOSKELETAL NEGATIVE: 1
CARDIOVASCULAR NEGATIVE: 1
EYES NEGATIVE: 1
NEUROLOGICAL NEGATIVE: 1
DEPRESSION: 0
GASTROINTESTINAL NEGATIVE: 1
WOUND: 1
PSYCHIATRIC NEGATIVE: 1
ALLERGIC/IMMUNOLOGIC NEGATIVE: 1

## 2023-04-05 ASSESSMENT — PATIENT HEALTH QUESTIONNAIRE - PHQ9
1. LITTLE INTEREST OR PLEASURE IN DOING THINGS: NOT AT ALL
SUM OF ALL RESPONSES TO PHQ9 QUESTIONS 1 AND 2: 0
2. FEELING DOWN, DEPRESSED OR HOPELESS: NOT AT ALL

## 2023-04-05 NOTE — PATIENT INSTRUCTIONS
Patient was seen and examined.  Diagnosis, treatment, and possible complications of today's illness discussed and explained to patient.  Patient educated and advised to follow up if worsening or persistent symptoms. Patient educated on when to seek urgent/emergent care. Patient was given opportunity to ask questions and denied any at this time.  Patient verbalized understanding and agrees with plan of care. Patient will follow up with wound care center as scheduled.

## 2023-04-05 NOTE — PROGRESS NOTES
"Subjective   Patient ID: Bora Moreno is a 88 y.o. female who presents for No chief complaint on file..  Patient presents to office with  as ER follow up.  Patient hit right forearm on bathroom sink and had skin tear yesterday. Patient has right forearm wrapped from visit at ER last night.  Patient was told to follow up with wound care center.  Patient went to wound care center today and was sent to office to see PCP.          Review of Systems   Constitutional: Negative.    HENT: Negative.     Eyes: Negative.    Respiratory: Negative.     Cardiovascular: Negative.    Gastrointestinal: Negative.    Endocrine: Negative.    Genitourinary: Negative.    Musculoskeletal: Negative.    Skin:  Positive for wound.        Right posterior forearm dressing in place.    Allergic/Immunologic: Negative.    Neurological: Negative.    Hematological: Negative.    Psychiatric/Behavioral: Negative.     All other systems reviewed and are negative.      /60   Pulse 94   Temp 36.6 °C (97.8 °F)   Resp 16   Ht 1.676 m (5' 6\")   Wt 53.1 kg (117 lb)   SpO2 94%   BMI 18.88 kg/m²     Objective   Physical Exam  Vitals and nursing note reviewed.   Constitutional:       Appearance: Normal appearance.   HENT:      Head: Normocephalic and atraumatic.      Right Ear: Tympanic membrane, ear canal and external ear normal.      Left Ear: Tympanic membrane, ear canal and external ear normal.      Nose: Nose normal.      Mouth/Throat:      Mouth: Mucous membranes are moist.      Pharynx: Oropharynx is clear.   Eyes:      Extraocular Movements: Extraocular movements intact.      Conjunctiva/sclera: Conjunctivae normal.      Pupils: Pupils are equal, round, and reactive to light.   Cardiovascular:      Rate and Rhythm: Normal rate and regular rhythm.      Pulses: Normal pulses.      Heart sounds: Normal heart sounds.   Pulmonary:      Effort: Pulmonary effort is normal.   Abdominal:      General: Bowel sounds are normal.      " Palpations: Abdomen is soft.   Musculoskeletal:         General: Normal range of motion.      Cervical back: Normal range of motion and neck supple.   Skin:     General: Skin is warm and dry.      Capillary Refill: Capillary refill takes less than 2 seconds.      Comments: Right posterior forearm approximately 6 cm curved skin tear with minimal bleeding noted.  Patient has xeroform and telfa over wound with curlex.  Telfa removed and new dressing applied prior to discharge.    Neurological:      General: No focal deficit present.      Mental Status: She is alert and oriented to person, place, and time.   Psychiatric:         Mood and Affect: Mood normal.         Behavior: Behavior normal.         Thought Content: Thought content normal.         Judgment: Judgment normal.         Assessment/Plan   Problem List Items Addressed This Visit    None

## 2023-04-05 NOTE — PROGRESS NOTES
Chief Complaint:  Right arm injury  Symptoms:  slight pain  Length of symptoms: 1 day at 10PM  OTC:  Related information: patient bumped into sink and cut arm. Went to ER and was told to follow up with wound care

## 2023-04-05 NOTE — ASSESSMENT & PLAN NOTE
Patient will keep covered and clean.  Patient will elevate today.  Patient will be scheduled to follow up with wound care.  Telfa removed and new dressing applied prior to discharge.

## 2023-04-19 DIAGNOSIS — R41.3 POOR MEMORY: ICD-10-CM

## 2023-04-19 RX ORDER — DONEPEZIL HYDROCHLORIDE 5 MG/1
5 TABLET, FILM COATED ORAL NIGHTLY
Qty: 30 TABLET | Refills: 5 | Status: SHIPPED | OUTPATIENT
Start: 2023-04-19 | End: 2024-02-20

## 2023-04-19 NOTE — TELEPHONE ENCOUNTER
Received a fax from [facility] requesting refill for the patient's prescription of Donepezil. Medication has been pended to the provider for approval.     Last Visit: 4/5/23  Next Visit: 6/29/23

## 2023-05-02 DIAGNOSIS — I10 ESSENTIAL HYPERTENSION: Primary | ICD-10-CM

## 2023-05-02 RX ORDER — VALSARTAN AND HYDROCHLOROTHIAZIDE 80; 12.5 MG/1; MG/1
TABLET, FILM COATED ORAL
Qty: 45 TABLET | Refills: 0 | Status: SHIPPED | OUTPATIENT
Start: 2023-05-02 | End: 2023-07-31

## 2023-06-29 ENCOUNTER — OFFICE VISIT (OUTPATIENT)
Dept: PRIMARY CARE | Facility: CLINIC | Age: 88
End: 2023-06-29
Payer: MEDICARE

## 2023-06-29 VITALS
WEIGHT: 114 LBS | HEART RATE: 64 BPM | TEMPERATURE: 97.8 F | BODY MASS INDEX: 18.32 KG/M2 | HEIGHT: 66 IN | DIASTOLIC BLOOD PRESSURE: 64 MMHG | OXYGEN SATURATION: 93 % | SYSTOLIC BLOOD PRESSURE: 110 MMHG

## 2023-06-29 DIAGNOSIS — R64 CACHEXIA (MULTI): ICD-10-CM

## 2023-06-29 DIAGNOSIS — D61.818 OTHER PANCYTOPENIA (MULTI): Primary | ICD-10-CM

## 2023-06-29 DIAGNOSIS — I10 ESSENTIAL HYPERTENSION: ICD-10-CM

## 2023-06-29 DIAGNOSIS — D50.8 IRON DEFICIENCY ANEMIA SECONDARY TO INADEQUATE DIETARY IRON INTAKE: ICD-10-CM

## 2023-06-29 DIAGNOSIS — D69.2 OTHER NONTHROMBOCYTOPENIC PURPURA (CMS-HCC): ICD-10-CM

## 2023-06-29 DIAGNOSIS — J45.30 MILD PERSISTENT ASTHMA WITHOUT COMPLICATION (HHS-HCC): ICD-10-CM

## 2023-06-29 DIAGNOSIS — E78.2 MIXED HYPERLIPIDEMIA: ICD-10-CM

## 2023-06-29 DIAGNOSIS — F03.B0 MODERATE DEMENTIA WITHOUT BEHAVIORAL DISTURBANCE, PSYCHOTIC DISTURBANCE, MOOD DISTURBANCE, OR ANXIETY, UNSPECIFIED DEMENTIA TYPE (MULTI): ICD-10-CM

## 2023-06-29 DIAGNOSIS — I25.10 CORONARY ARTERY DISEASE INVOLVING NATIVE CORONARY ARTERY OF NATIVE HEART WITHOUT ANGINA PECTORIS: ICD-10-CM

## 2023-06-29 DIAGNOSIS — I73.9 PERIPHERAL VASCULAR DISEASE, UNSPECIFIED (CMS-HCC): ICD-10-CM

## 2023-06-29 PROCEDURE — 99214 OFFICE O/P EST MOD 30 MIN: CPT | Performed by: FAMILY MEDICINE

## 2023-06-29 PROCEDURE — 3078F DIAST BP <80 MM HG: CPT | Performed by: FAMILY MEDICINE

## 2023-06-29 PROCEDURE — 1036F TOBACCO NON-USER: CPT | Performed by: FAMILY MEDICINE

## 2023-06-29 PROCEDURE — 1159F MED LIST DOCD IN RCRD: CPT | Performed by: FAMILY MEDICINE

## 2023-06-29 PROCEDURE — 1160F RVW MEDS BY RX/DR IN RCRD: CPT | Performed by: FAMILY MEDICINE

## 2023-06-29 PROCEDURE — 3074F SYST BP LT 130 MM HG: CPT | Performed by: FAMILY MEDICINE

## 2023-06-29 ASSESSMENT — ENCOUNTER SYMPTOMS
HEMATURIA: 0
NECK PAIN: 0
EYE DISCHARGE: 0
WEAKNESS: 0
ADENOPATHY: 0
NAUSEA: 0
ARTHRALGIAS: 0
DYSPHORIC MOOD: 0
SHORTNESS OF BREATH: 0
BLOOD IN STOOL: 0
DIFFICULTY URINATING: 0
HEADACHES: 0
ABDOMINAL PAIN: 0
VOMITING: 0
SORE THROAT: 0
DIARRHEA: 0
NUMBNESS: 0
ACTIVITY CHANGE: 0
NERVOUS/ANXIOUS: 0
CONSTIPATION: 0
FATIGUE: 0
COUGH: 0
DIZZINESS: 0
BACK PAIN: 0
MYALGIAS: 0
BRUISES/BLEEDS EASILY: 0
CHEST TIGHTNESS: 0

## 2023-06-29 NOTE — PROGRESS NOTES
"Subjective   Patient ID: Bora Moreno is a 88 y.o. female who presents for 3 month follow up on Hyperlipidemia and Hypertension.      HPI  Patient here today with spouse    Anemia stable  No progression or worsening  No bleeding noted    Chronic pancytopenia stable as well    Clinical cachexia noted  Continue to try to gain some weight    Memory and dementia symptoms stable  Actually she seems better today    Peripheral vascular disease stable and coronary disease stable keep cardiac follow-up    Hypertension and high cholesterol stable  Modify risk factors as appropriate    Asthma stable  No reports of cough or wheeze or shortness of breath  Review of Systems   Constitutional:  Negative for activity change and fatigue.   HENT:  Negative for congestion and sore throat.    Eyes:  Negative for discharge.   Respiratory:  Negative for cough, chest tightness and shortness of breath.    Cardiovascular:  Negative for chest pain and leg swelling.   Gastrointestinal:  Negative for abdominal pain, blood in stool, constipation, diarrhea, nausea and vomiting.   Endocrine: Negative for cold intolerance and heat intolerance.   Genitourinary:  Negative for difficulty urinating and hematuria.   Musculoskeletal:  Negative for arthralgias, back pain, gait problem, myalgias and neck pain.   Allergic/Immunologic: Negative for environmental allergies.   Neurological:  Negative for dizziness, syncope, weakness, numbness and headaches.   Hematological:  Negative for adenopathy. Does not bruise/bleed easily.   Psychiatric/Behavioral:  Negative for dysphoric mood. The patient is not nervous/anxious.    All other systems reviewed and are negative.      Objective   /64 (BP Location: Right arm, BP Cuff Size: Small adult)   Pulse 64   Temp 36.6 °C (97.8 °F)   Ht 1.676 m (5' 6\")   Wt 51.7 kg (114 lb)   SpO2 93%   BMI 18.40 kg/m²    Physical Exam  Vitals and nursing note reviewed.   Constitutional:       General: She is not in " acute distress.     Appearance: Normal appearance.   HENT:      Head: Normocephalic and atraumatic.      Right Ear: Tympanic membrane, ear canal and external ear normal.      Left Ear: Tympanic membrane, ear canal and external ear normal.      Nose: Nose normal.      Mouth/Throat:      Mouth: Mucous membranes are moist.      Pharynx: Oropharynx is clear. No oropharyngeal exudate or posterior oropharyngeal erythema.   Eyes:      Extraocular Movements: Extraocular movements intact.      Conjunctiva/sclera: Conjunctivae normal.      Pupils: Pupils are equal, round, and reactive to light.   Cardiovascular:      Rate and Rhythm: Normal rate and regular rhythm.      Pulses: Normal pulses.      Heart sounds: Normal heart sounds. No murmur heard.  Pulmonary:      Effort: Pulmonary effort is normal. No respiratory distress.      Breath sounds: Normal breath sounds. No wheezing or rales.   Abdominal:      General: Abdomen is flat. Bowel sounds are normal. There is no distension.      Palpations: Abdomen is soft. There is no mass.      Tenderness: There is no abdominal tenderness.   Musculoskeletal:         General: No swelling or deformity. Normal range of motion.      Cervical back: Normal range of motion and neck supple.      Right lower leg: No edema.      Left lower leg: No edema.   Lymphadenopathy:      Cervical: No cervical adenopathy.   Skin:     General: Skin is warm and dry.      Capillary Refill: Capillary refill takes less than 2 seconds.      Findings: No lesion or rash.   Neurological:      General: No focal deficit present.      Mental Status: She is alert and oriented to person, place, and time.      Cranial Nerves: No cranial nerve deficit.      Motor: No weakness.   Psychiatric:         Mood and Affect: Mood normal.         Behavior: Behavior normal.         Thought Content: Thought content normal.         Judgment: Judgment normal.         Assessment/Plan   Problem List Items Addressed This Visit       Iron  deficiency anemia secondary to inadequate dietary iron intake    Coronary artery disease involving native coronary artery of native heart without angina pectoris    Relevant Orders    Follow Up In [facility] - PCP    Essential hypertension    Mixed hyperlipidemia    Mild persistent asthma without complication    Other pancytopenia (CMS/HCC) - Primary    Cachexia (CMS/HCC)    Moderate dementia without behavioral disturbance, psychotic disturbance, mood disturbance, or anxiety, unspecified dementia type (CMS/HCC)    Peripheral vascular disease, unspecified (CMS/HCC)    Other nonthrombocytopenic purpura (CMS/HCC)       Patient education provided.  Stay current with age appropriate health maintenance as instructed.  Appointment here or ER with new or worsening symptoms'  Keep appropriate follow-up visit.  Stay current with proper immunizations   Weight gain, good nutrition, 3-month recheck, discussed at length with spouse

## 2023-07-31 DIAGNOSIS — I10 ESSENTIAL HYPERTENSION: ICD-10-CM

## 2023-07-31 RX ORDER — VALSARTAN AND HYDROCHLOROTHIAZIDE 80; 12.5 MG/1; MG/1
TABLET, FILM COATED ORAL
Qty: 45 TABLET | Refills: 3 | Status: SHIPPED | OUTPATIENT
Start: 2023-07-31

## 2023-09-25 DIAGNOSIS — J45.30 MILD PERSISTENT ASTHMA WITHOUT COMPLICATION (HHS-HCC): Primary | ICD-10-CM

## 2023-09-25 RX ORDER — FLUTICASONE PROPIONATE AND SALMETEROL 50; 500 UG/1; UG/1
POWDER RESPIRATORY (INHALATION)
Qty: 60 EACH | Refills: 3 | Status: SHIPPED | OUTPATIENT
Start: 2023-09-25 | End: 2023-12-29 | Stop reason: ALTCHOICE

## 2023-09-25 NOTE — TELEPHONE ENCOUNTER
Recent Visits  Date Type Provider Dept   06/29/23 Office Visit Jermain Jones MD Do [id number] [facility]   04/05/23 Office Visit Kelly J Slavik-Bosworth, APRN-CNP Do [id number] [facility]   03/30/23 Office Visit Jermain Jones MD Do [id number] [facility]   Showing recent visits within past 540 days and meeting all other requirements  Future Appointments  Date Type Provider Dept   09/29/23 Appointment Jermain Jones MD Do [id number] [facility]   Showing future appointments within next 180 days and meeting all other requirements

## 2023-09-29 ENCOUNTER — OFFICE VISIT (OUTPATIENT)
Dept: PRIMARY CARE | Facility: CLINIC | Age: 88
End: 2023-09-29
Payer: MEDICARE

## 2023-09-29 VITALS
BODY MASS INDEX: 18.46 KG/M2 | TEMPERATURE: 98.7 F | DIASTOLIC BLOOD PRESSURE: 60 MMHG | HEART RATE: 81 BPM | SYSTOLIC BLOOD PRESSURE: 112 MMHG | WEIGHT: 114.4 LBS

## 2023-09-29 DIAGNOSIS — Z00.00 ROUTINE GENERAL MEDICAL EXAMINATION AT HEALTH CARE FACILITY: Primary | ICD-10-CM

## 2023-09-29 DIAGNOSIS — I25.10 CORONARY ARTERY DISEASE INVOLVING NATIVE CORONARY ARTERY OF NATIVE HEART WITHOUT ANGINA PECTORIS: ICD-10-CM

## 2023-09-29 DIAGNOSIS — E78.2 MIXED HYPERLIPIDEMIA: ICD-10-CM

## 2023-09-29 DIAGNOSIS — I10 ESSENTIAL HYPERTENSION: ICD-10-CM

## 2023-09-29 PROCEDURE — 1036F TOBACCO NON-USER: CPT | Performed by: FAMILY MEDICINE

## 2023-09-29 PROCEDURE — 99214 OFFICE O/P EST MOD 30 MIN: CPT | Performed by: FAMILY MEDICINE

## 2023-09-29 PROCEDURE — 3074F SYST BP LT 130 MM HG: CPT | Performed by: FAMILY MEDICINE

## 2023-09-29 PROCEDURE — 1159F MED LIST DOCD IN RCRD: CPT | Performed by: FAMILY MEDICINE

## 2023-09-29 PROCEDURE — G0439 PPPS, SUBSEQ VISIT: HCPCS | Performed by: FAMILY MEDICINE

## 2023-09-29 PROCEDURE — 90662 IIV NO PRSV INCREASED AG IM: CPT | Performed by: FAMILY MEDICINE

## 2023-09-29 PROCEDURE — G0008 ADMIN INFLUENZA VIRUS VAC: HCPCS | Performed by: FAMILY MEDICINE

## 2023-09-29 PROCEDURE — 1170F FXNL STATUS ASSESSED: CPT | Performed by: FAMILY MEDICINE

## 2023-09-29 PROCEDURE — 1160F RVW MEDS BY RX/DR IN RCRD: CPT | Performed by: FAMILY MEDICINE

## 2023-09-29 PROCEDURE — 3078F DIAST BP <80 MM HG: CPT | Performed by: FAMILY MEDICINE

## 2023-09-29 ASSESSMENT — ENCOUNTER SYMPTOMS
OCCASIONAL FEELINGS OF UNSTEADINESS: 0
DIZZINESS: 0
HEADACHES: 0
BLOOD IN STOOL: 0
NECK PAIN: 0
DIARRHEA: 0
HEMATURIA: 0
VOMITING: 0
ABDOMINAL PAIN: 0
MYALGIAS: 0
DIFFICULTY URINATING: 0
BRUISES/BLEEDS EASILY: 0
SORE THROAT: 0
EYE DISCHARGE: 0
WEAKNESS: 0
DYSPHORIC MOOD: 0
NAUSEA: 0
ADENOPATHY: 0
NERVOUS/ANXIOUS: 0
COUGH: 0
SHORTNESS OF BREATH: 0
LOSS OF SENSATION IN FEET: 0
DEPRESSION: 0
NUMBNESS: 0
BACK PAIN: 0
CHEST TIGHTNESS: 0
FATIGUE: 0
CONSTIPATION: 0
ACTIVITY CHANGE: 0
ARTHRALGIAS: 0

## 2023-09-29 ASSESSMENT — ACTIVITIES OF DAILY LIVING (ADL)
MANAGING_FINANCES: INDEPENDENT
GROCERY_SHOPPING: INDEPENDENT
TAKING_MEDICATION: INDEPENDENT
BATHING: INDEPENDENT
DOING_HOUSEWORK: INDEPENDENT
DRESSING: INDEPENDENT

## 2023-09-29 ASSESSMENT — PATIENT HEALTH QUESTIONNAIRE - PHQ9
2. FEELING DOWN, DEPRESSED OR HOPELESS: NOT AT ALL
1. LITTLE INTEREST OR PLEASURE IN DOING THINGS: NOT AT ALL
SUM OF ALL RESPONSES TO PHQ9 QUESTIONS 1 AND 2: 0

## 2023-09-29 NOTE — PROGRESS NOTES
Subjective   Reason for Visit: Bora Moreno is an 88 y.o. female here for a Medicare Wellness visit.     Past Medical, Surgical, and Family History reviewed and updated in chart.    Reviewed all medications by prescribing practitioner or clinical pharmacist (such as prescriptions, OTCs, herbal therapies and supplements) and documented in the medical record.    HPI  Annual Medicare wellness visit    Also general checkup    Coronary disease stable  No angina  Keep cardiac follow-up    Hypertension stable  No chest pain or shortness of breath    High cholesterol stable  Watch diet    Patient here today with spouse  Patient Care Team:  Jermain Jones MD as PCP - General  [facility]     Review of Systems   Constitutional:  Negative for activity change and fatigue.   HENT:  Negative for congestion and sore throat.    Eyes:  Negative for discharge.   Respiratory:  Negative for cough, chest tightness and shortness of breath.    Cardiovascular:  Negative for chest pain and leg swelling.   Gastrointestinal:  Negative for abdominal pain, blood in stool, constipation, diarrhea, nausea and vomiting.   Endocrine: Negative for cold intolerance and heat intolerance.   Genitourinary:  Negative for difficulty urinating and hematuria.   Musculoskeletal:  Negative for arthralgias, back pain, gait problem, myalgias and neck pain.   Allergic/Immunologic: Negative for environmental allergies.   Neurological:  Negative for dizziness, syncope, weakness, numbness and headaches.   Hematological:  Negative for adenopathy. Does not bruise/bleed easily.   Psychiatric/Behavioral:  Negative for dysphoric mood. The patient is not nervous/anxious.    All other systems reviewed and are negative.      Objective   Vitals:  /60 (BP Location: Right arm, BP Cuff Size: Adult)   Pulse 81   Temp 37.1 °C (98.7 °F) (Temporal)   Wt 51.9 kg (114 lb 6.4 oz)   BMI 18.46 kg/m²       Physical  Exam  Vitals and nursing note reviewed.   Constitutional:       General: She is not in acute distress.     Appearance: Normal appearance.   HENT:      Head: Normocephalic and atraumatic.      Right Ear: Tympanic membrane, ear canal and external ear normal.      Left Ear: Tympanic membrane, ear canal and external ear normal.      Nose: Nose normal.      Mouth/Throat:      Mouth: Mucous membranes are moist.      Pharynx: Oropharynx is clear. No oropharyngeal exudate or posterior oropharyngeal erythema.   Eyes:      Extraocular Movements: Extraocular movements intact.      Conjunctiva/sclera: Conjunctivae normal.      Pupils: Pupils are equal, round, and reactive to light.   Cardiovascular:      Rate and Rhythm: Normal rate and regular rhythm.      Pulses: Normal pulses.      Heart sounds: Normal heart sounds. No murmur heard.  Pulmonary:      Effort: Pulmonary effort is normal. No respiratory distress.      Breath sounds: Normal breath sounds. No wheezing or rales.   Abdominal:      General: Abdomen is flat. Bowel sounds are normal. There is no distension.      Palpations: Abdomen is soft. There is no mass.      Tenderness: There is no abdominal tenderness.   Musculoskeletal:         General: No swelling or deformity. Normal range of motion.      Cervical back: Normal range of motion and neck supple.      Right lower leg: No edema.      Left lower leg: No edema.   Lymphadenopathy:      Cervical: No cervical adenopathy.   Skin:     General: Skin is warm and dry.      Capillary Refill: Capillary refill takes less than 2 seconds.      Findings: No lesion or rash.   Neurological:      General: No focal deficit present.      Mental Status: She is alert and oriented to person, place, and time.      Cranial Nerves: No cranial nerve deficit.      Motor: No weakness.   Psychiatric:         Mood and Affect: Mood normal.         Behavior: Behavior normal.         Thought Content: Thought content normal.         Judgment:  Judgment normal.         Assessment/Plan   Problem List Items Addressed This Visit       Coronary artery disease involving native coronary artery of native heart without angina pectoris    Relevant Orders    Follow Up In [facility] - PCP    Essential hypertension    Mixed hyperlipidemia     Other Visit Diagnoses       Routine general medical examination at health care facility    -  Primary          Patient education provided.  Stay current with age appropriate health maintenance as instructed.  Appointment here or ER with new or worsening symptoms'  Keep appropriate follow-up visit.  Stay current with proper immunizations  Recheck 3 months and as needed  Return sooner with new or worsening symptoms  Discussed at length with patient and spouse

## 2023-10-23 DIAGNOSIS — J45.30 MILD PERSISTENT ASTHMA WITHOUT COMPLICATION (HHS-HCC): Primary | ICD-10-CM

## 2023-10-23 RX ORDER — MONTELUKAST SODIUM 10 MG/1
10 TABLET ORAL DAILY
Qty: 90 TABLET | Refills: 3 | Status: SHIPPED | OUTPATIENT
Start: 2023-10-23

## 2023-10-23 NOTE — TELEPHONE ENCOUNTER
Recent Visits  Date Type Provider Dept   09/29/23 Office Visit Jermain Jones MD Do [id number] [facility]   06/29/23 Office Visit Jermain Jones MD Do [id number] [facility]   04/05/23 Office Visit Kelly J Slavik-Bosworth, APRN-CNP Do [id number] [facility]   03/30/23 Office Visit Jermain Jones MD Do [id number]   Showing recent visits within past 540 days and meeting all other requirements  Future Appointments  Date Type Provider Dept   12/29/23 Appointment Jermain Jones MD Do [id number] [facility]   Showing future appointments within next 180 days and meeting all other requirements

## 2023-12-26 NOTE — PROGRESS NOTES
"Subjective   Patient ID: Bora Moreno is a 88 y.o. female who presents for Hyperlipidemia, Hypertension, and Coronary Artery Disease.  HPI  Coronary disease stable  No chest pain or shortness of breath    Hypertension stable  Tolerates medicine    High cholesterol stable watch diet    Peripheral vascular disease stable lifestyle modification    Asthma stable no cough or wheeze    Osteoarthritis present  Multiple joints    Dementia stable  No progression or worsening  Doing well at home     patient here today with spouse      Review of Systems   Constitutional:  Negative for activity change and fatigue.   HENT:  Negative for congestion and sore throat.    Eyes:  Negative for discharge.   Respiratory:  Negative for cough, chest tightness and shortness of breath.    Cardiovascular:  Negative for chest pain and leg swelling.   Gastrointestinal:  Negative for abdominal pain, blood in stool, constipation, diarrhea, nausea and vomiting.   Endocrine: Negative for cold intolerance and heat intolerance.   Genitourinary:  Negative for difficulty urinating and hematuria.   Musculoskeletal:  Negative for arthralgias, back pain, gait problem, myalgias and neck pain.   Allergic/Immunologic: Negative for environmental allergies.   Neurological:  Negative for dizziness, syncope, weakness, numbness and headaches.   Hematological:  Negative for adenopathy. Does not bruise/bleed easily.   Psychiatric/Behavioral:  Negative for dysphoric mood. The patient is not nervous/anxious.    All other systems reviewed and are negative.      Objective   /68 (BP Location: Left arm, BP Cuff Size: Adult)   Pulse 83   Temp 36.8 °C (98.2 °F) (Temporal)   Ht 1.676 m (5' 6\")   Wt 50.2 kg (110 lb 9.6 oz)   SpO2 97%   BMI 17.85 kg/m²    Physical Exam  Vitals and nursing note reviewed.   Constitutional:       General: She is not in acute distress.     Appearance: Normal appearance.   HENT:      Head: Normocephalic and atraumatic.      Right " Ear: Tympanic membrane, ear canal and external ear normal.      Left Ear: Tympanic membrane, ear canal and external ear normal.      Nose: Nose normal.      Mouth/Throat:      Mouth: Mucous membranes are moist.      Pharynx: Oropharynx is clear. No oropharyngeal exudate or posterior oropharyngeal erythema.   Eyes:      Extraocular Movements: Extraocular movements intact.      Conjunctiva/sclera: Conjunctivae normal.      Pupils: Pupils are equal, round, and reactive to light.   Cardiovascular:      Rate and Rhythm: Normal rate and regular rhythm.      Pulses: Normal pulses.      Heart sounds: Normal heart sounds. No murmur heard.  Pulmonary:      Effort: Pulmonary effort is normal. No respiratory distress.      Breath sounds: Normal breath sounds. No wheezing or rales.   Abdominal:      General: Abdomen is flat. Bowel sounds are normal. There is no distension.      Palpations: Abdomen is soft. There is no mass.      Tenderness: There is no abdominal tenderness.   Musculoskeletal:         General: No swelling or deformity. Normal range of motion.      Cervical back: Normal range of motion and neck supple.      Right lower leg: No edema.      Left lower leg: No edema.   Lymphadenopathy:      Cervical: No cervical adenopathy.   Skin:     General: Skin is warm and dry.      Capillary Refill: Capillary refill takes less than 2 seconds.      Findings: No lesion or rash.   Neurological:      General: No focal deficit present.      Mental Status: She is alert and oriented to person, place, and time.      Cranial Nerves: No cranial nerve deficit.      Motor: No weakness.   Psychiatric:         Mood and Affect: Mood normal.         Behavior: Behavior normal.         Thought Content: Thought content normal.         Judgment: Judgment normal.         Assessment/Plan   Problem List Items Addressed This Visit       Coronary artery disease involving native coronary artery of native heart without angina pectoris - Primary     Primary osteoarthritis involving multiple joints    Essential hypertension    Mixed hyperlipidemia    Mild persistent asthma without complication    Relevant Medications    fluticasone furoate-vilanteroL (Breo Ellipta) 200-25 mcg/dose inhaler    Moderate dementia without behavioral disturbance, psychotic disturbance, mood disturbance, or anxiety, unspecified dementia type (CMS/[facility])    Peripheral vascular disease, unspecified (CMS/[facility])       Patient education provided.  Stay current with age appropriate health maintenance as instructed.  Appointment here or ER with new or worsening symptoms'  Keep appropriate follow-up visit.  Stay current with proper immunizations   Discussed with spouse  Refill as above  Recheck 3 months and as needed

## 2023-12-29 ENCOUNTER — OFFICE VISIT (OUTPATIENT)
Dept: PRIMARY CARE | Facility: CLINIC | Age: 88
End: 2023-12-29
Payer: MEDICARE

## 2023-12-29 VITALS
TEMPERATURE: 98.2 F | OXYGEN SATURATION: 97 % | HEART RATE: 83 BPM | DIASTOLIC BLOOD PRESSURE: 68 MMHG | HEIGHT: 66 IN | WEIGHT: 110.6 LBS | SYSTOLIC BLOOD PRESSURE: 120 MMHG | BODY MASS INDEX: 17.78 KG/M2

## 2023-12-29 DIAGNOSIS — F03.B0 MODERATE DEMENTIA WITHOUT BEHAVIORAL DISTURBANCE, PSYCHOTIC DISTURBANCE, MOOD DISTURBANCE, OR ANXIETY, UNSPECIFIED DEMENTIA TYPE (MULTI): ICD-10-CM

## 2023-12-29 DIAGNOSIS — M15.9 PRIMARY OSTEOARTHRITIS INVOLVING MULTIPLE JOINTS: ICD-10-CM

## 2023-12-29 DIAGNOSIS — J45.30 MILD PERSISTENT ASTHMA WITHOUT COMPLICATION (HHS-HCC): ICD-10-CM

## 2023-12-29 DIAGNOSIS — I73.9 PERIPHERAL VASCULAR DISEASE, UNSPECIFIED (CMS-HCC): ICD-10-CM

## 2023-12-29 DIAGNOSIS — I10 ESSENTIAL HYPERTENSION: ICD-10-CM

## 2023-12-29 DIAGNOSIS — I25.10 CORONARY ARTERY DISEASE INVOLVING NATIVE CORONARY ARTERY OF NATIVE HEART WITHOUT ANGINA PECTORIS: Primary | ICD-10-CM

## 2023-12-29 DIAGNOSIS — E78.2 MIXED HYPERLIPIDEMIA: ICD-10-CM

## 2023-12-29 PROCEDURE — 3074F SYST BP LT 130 MM HG: CPT | Performed by: FAMILY MEDICINE

## 2023-12-29 PROCEDURE — 1160F RVW MEDS BY RX/DR IN RCRD: CPT | Performed by: FAMILY MEDICINE

## 2023-12-29 PROCEDURE — 1036F TOBACCO NON-USER: CPT | Performed by: FAMILY MEDICINE

## 2023-12-29 PROCEDURE — 99214 OFFICE O/P EST MOD 30 MIN: CPT | Performed by: FAMILY MEDICINE

## 2023-12-29 PROCEDURE — 1159F MED LIST DOCD IN RCRD: CPT | Performed by: FAMILY MEDICINE

## 2023-12-29 PROCEDURE — 3078F DIAST BP <80 MM HG: CPT | Performed by: FAMILY MEDICINE

## 2023-12-29 RX ORDER — FUROSEMIDE 20 MG/1
20 TABLET ORAL AS NEEDED
COMMUNITY
Start: 2023-10-21

## 2023-12-29 RX ORDER — FLUTICASONE FUROATE AND VILANTEROL 200; 25 UG/1; UG/1
1 POWDER RESPIRATORY (INHALATION) DAILY
Qty: 2 EACH | Refills: 3 | Status: SHIPPED | OUTPATIENT
Start: 2023-12-29

## 2023-12-29 ASSESSMENT — ENCOUNTER SYMPTOMS
ADENOPATHY: 0
NECK PAIN: 0
BLOOD IN STOOL: 0
ACTIVITY CHANGE: 0
BACK PAIN: 0
EYE DISCHARGE: 0
CHEST TIGHTNESS: 0
NERVOUS/ANXIOUS: 0
COUGH: 0
NAUSEA: 0
DIFFICULTY URINATING: 0
SHORTNESS OF BREATH: 0
HEMATURIA: 0
MYALGIAS: 0
VOMITING: 0
BRUISES/BLEEDS EASILY: 0
DIZZINESS: 0
DIARRHEA: 0
ARTHRALGIAS: 0
CONSTIPATION: 0
ABDOMINAL PAIN: 0
HEADACHES: 0
FATIGUE: 0
WEAKNESS: 0
NUMBNESS: 0
SORE THROAT: 0
DYSPHORIC MOOD: 0

## 2024-02-09 ENCOUNTER — HOSPITAL ENCOUNTER (OUTPATIENT)
Dept: CARDIOLOGY | Facility: CLINIC | Age: 89
Discharge: HOME | End: 2024-02-09
Payer: MEDICARE

## 2024-02-09 DIAGNOSIS — I65.23 BILATERAL CAROTID ARTERY STENOSIS: ICD-10-CM

## 2024-02-09 PROCEDURE — 93880 EXTRACRANIAL BILAT STUDY: CPT

## 2024-02-09 PROCEDURE — 93880 EXTRACRANIAL BILAT STUDY: CPT | Performed by: INTERNAL MEDICINE

## 2024-02-20 DIAGNOSIS — R41.3 POOR MEMORY: ICD-10-CM

## 2024-02-20 RX ORDER — DONEPEZIL HYDROCHLORIDE 5 MG/1
5 TABLET, FILM COATED ORAL NIGHTLY
Qty: 90 TABLET | Refills: 1 | Status: SHIPPED | OUTPATIENT
Start: 2024-02-20

## 2024-02-20 NOTE — TELEPHONE ENCOUNTER
Recent Visits  Date Type Provider Dept   12/29/23 Office Visit Jermain Jones MD Do [id number] [facility]   09/29/23 Office Visit Jermain Jones MD Do [id number] [facility]   06/29/23 Office Visit Jermain Jones MD Do [id number] [facility]   04/05/23 Office Visit Kelly J Slavik-Bosworth, APRN-CNP Do [id number] [facility]   03/30/23 Office Visit Jermain Jones MD Do [id number] [facility]   Showing recent visits within past 540 days and meeting all other requirements  Future Appointments  Date Type Provider Dept   03/29/24 Appointment Jermain Jones MD Do [id number] [facility]   Showing future appointments within next 180 days and meeting all other requirements

## 2024-02-21 ENCOUNTER — OFFICE VISIT (OUTPATIENT)
Dept: CARDIOLOGY | Facility: CLINIC | Age: 89
End: 2024-02-21
Payer: MEDICARE

## 2024-02-21 VITALS
HEART RATE: 88 BPM | BODY MASS INDEX: 19.04 KG/M2 | DIASTOLIC BLOOD PRESSURE: 58 MMHG | HEIGHT: 64 IN | SYSTOLIC BLOOD PRESSURE: 120 MMHG | WEIGHT: 111.5 LBS

## 2024-02-21 DIAGNOSIS — I34.0 SEVERE MITRAL REGURGITATION: ICD-10-CM

## 2024-02-21 DIAGNOSIS — I25.10 CORONARY ARTERY DISEASE INVOLVING NATIVE CORONARY ARTERY OF NATIVE HEART WITHOUT ANGINA PECTORIS: ICD-10-CM

## 2024-02-21 DIAGNOSIS — Z87.891 FORMER SMOKER: ICD-10-CM

## 2024-02-21 DIAGNOSIS — I65.23 BILATERAL CAROTID ARTERY STENOSIS: ICD-10-CM

## 2024-02-21 DIAGNOSIS — I35.0 NONRHEUMATIC AORTIC VALVE STENOSIS: ICD-10-CM

## 2024-02-21 DIAGNOSIS — R60.9 EDEMA, UNSPECIFIED TYPE: Primary | ICD-10-CM

## 2024-02-21 DIAGNOSIS — E78.2 MIXED HYPERLIPIDEMIA: ICD-10-CM

## 2024-02-21 DIAGNOSIS — I10 ESSENTIAL HYPERTENSION: ICD-10-CM

## 2024-02-21 PROCEDURE — 99214 OFFICE O/P EST MOD 30 MIN: CPT | Performed by: INTERNAL MEDICINE

## 2024-02-21 PROCEDURE — 3078F DIAST BP <80 MM HG: CPT | Performed by: INTERNAL MEDICINE

## 2024-02-21 PROCEDURE — 3074F SYST BP LT 130 MM HG: CPT | Performed by: INTERNAL MEDICINE

## 2024-02-21 PROCEDURE — 1036F TOBACCO NON-USER: CPT | Performed by: INTERNAL MEDICINE

## 2024-02-21 PROCEDURE — 1159F MED LIST DOCD IN RCRD: CPT | Performed by: INTERNAL MEDICINE

## 2024-02-21 NOTE — PROGRESS NOTES
CARDIOLOGY OFFICE VISIT      CHIEF COMPLAINT  Chief Complaint   Patient presents with    Follow-up     1 year follow up and to review Carotid Duplex results        HISTORY OF PRESENT ILLNESS  Patient is a 89-year-old  female with past medical history significant for aortic valve stenosis, s/p TAVR 21, hypertension, hyperlipidemia, hyperglycemia, asthma who presented to the hospital with a syncopal episode.     Patient denies chest pain.  Patient has occasional dyspnea on exertion improved with using metered-dose inhaler.  Denies palpitations, nausea, vomiting.  Patient had dizziness in the fall  and felt near syncopal.  The paramedic came to her home and checked her out.  She did not have taina syncopal episode.  The symptoms abated without further evaluation or treatment.  She has some mild edema and will take her Lasix 3 times a week.  No formal exercise program.       Past surgical history:  Hysterectomy  Bilateral cataract     Social history:  Quit smoking   Denies alcohol use     Family history:  Father  78 multiple strokes  Mother  86 old age     Review of systems have been reviewed and are negative, noncontributory, or as previously mentioned x12 systems.     I personally reviewed EKG 2021: Normal sinus rhythm, left atrial enlargement     Assessment:  Aortic valve stenosis s/p TAVR 21  Hypertension  Hyperlipidemia  Coronary artery disease-mild on cardiac cath 2021  Carotid artery stenosis  Mitral valve regurgitation  Tricuspid valve regurgitation  Anemia        Recommendations:  Patient appears to be stable from a cardiac standpoint. No symptoms of angina. No symptomatic vascular disease.  Stable TAVR prosthesis on echocardiogram. No symptomatic vascular disease. No symptomatic arrhythmia. Blood pressure under control.  Will prescribe compression stockings knee-high 15-20 mmHg be worn daily.  Attempt to decrease use of Lasix with use of  compression stockings.  Follow-up in 1 year.  Obtain echocardiogram prior to office visit.     Please excuse any errors in grammar or translation related to this dictation. Voice recognition software was utilized to prepare this document.     Recent Cardiovascular Testing:  The following results have been reviewed and updated.   Labs 3/1/23  , , HDL 38, LDL 85     Echo: 9/20/22  1. EF 60-65%  2. S/P TAVR peak gradient is 33 mmHg, mean gradient is 18 mmHg     CRD: 2/9/24  1. Bilateral ICA 50-69%     Cardiac cath: 3/29/21  1. Mild CAD.  2. EF 60%.  3. Pulmonary arterial hypertension, mild.       VITALS  Vitals:    02/21/24 1126   BP: 120/58   Pulse: 88     Wt Readings from Last 4 Encounters:   02/21/24 50.6 kg (111 lb 8 oz)   12/29/23 50.2 kg (110 lb 9.6 oz)   09/29/23 51.9 kg (114 lb 6.4 oz)   06/29/23 51.7 kg (114 lb)       PHYSICAL EXAM:  GENERAL:  Well developed, well nourished, in no acute distress.  CHEST:  Symmetric and nontender.  NEURO/PSYCH:  Alert and oriented times three with approppriate behavior and responses.  NECK:  Supple, no JVD, no bruit.  LUNGS:  Clear to auscultation bilaterally, normal respiratory effort.  HEART:  Rate and rhythm REGULAR with no evident murmur, no gallop appreciated.    There are no rubs, clicks or heaves.  EXTREMITIES:  Warm with good color, no clubbing or cyanosis.  There is no edema noted.  PERIPHERAL VASCULAR:  Pulses present and equally palpable; 2+ throughout.    ASSESSMENT AND PLAN  Diagnoses and all orders for this visit:  Nonrheumatic aortic valve stenosis  Essential hypertension  Mixed hyperlipidemia  Coronary artery disease involving native coronary artery of native heart without angina pectoris  Bilateral carotid artery stenosis  Severe mitral regurgitation  Former smoker  Body mass index (BMI) of 19.0 to 19.9 in adult      Past Medical History  Past Medical History:   Diagnosis Date    Personal history of other diseases of the circulatory system  "2022    History of aortic valve stenosis       Social History  Social History     Tobacco Use    Smoking status: Former     Years: 15     Types: Cigarettes     Quit date:      Years since quittin.1     Passive exposure: Never    Smokeless tobacco: Never   Substance Use Topics    Alcohol use: Never    Drug use: Never       Family History     Family History   Problem Relation Name Age of Onset    Stroke Father      Other (cerebrovascular accident) Father          cerebrovascular accident (CVA)    Coronary artery disease Father          Allergies:  Allergies   Allergen Reactions    Captopril Unknown     rhinitis    Influenza Virus Vaccines Nausea And Vomiting        Outpatient Medications:  Current Outpatient Medications   Medication Instructions    aspirin 81 mg, oral, Daily    atorvastatin (LIPITOR) 20 mg, oral, Nightly    donepezil (ARICEPT) 5 mg, oral, Nightly    fluticasone furoate-vilanteroL (Breo Ellipta) 200-25 mcg/dose inhaler 1 puff, inhalation, Daily    furosemide (LASIX) 20 mg, oral, As needed    montelukast (SINGULAIR) 10 mg, oral, Daily    valsartan-hydrochlorothiazide (Diovan-HCT) 80-12.5 mg tablet TAKE ONE-HALF (1/2) TABLET DAILY        Recent Lab Results:    CMP:    Lab Results   Component Value Date     2023    K 4.3 2023     (H) 2023    CO2 26 2023    BUN 42 (H) 2023    CREATININE 1.78 (H) 2023    GLUCOSE 121 (H) 2023    CALCIUM 9.5 2023       Magnesium:    Lab Results   Component Value Date    MG 2.07 2021       Lipid Profile:    Lab Results   Component Value Date    TRIG 128 2023    HDL 38.7 (A) 2023       TSH:    Lab Results   Component Value Date    TSH 2.11 2021       BNP:   Lab Results   Component Value Date     (H) 2021        PT/INR:    Lab Results   Component Value Date    PROTIME 11.6 2021    INR 1.0 2021       HgBA1c:    No results found for: \"HGBA1C\"    BMP:  Lab " "Results   Component Value Date     03/01/2023     02/01/2022     07/30/2021    K 4.3 03/01/2023    K 4.7 02/01/2022    K 4.2 07/30/2021     (H) 03/01/2023     02/01/2022     07/30/2021    CO2 26 03/01/2023    CO2 27 02/01/2022    CO2 25 07/30/2021    BUN 42 (H) 03/01/2023    BUN 42 (H) 02/01/2022    BUN 32 (H) 07/30/2021    CREATININE 1.78 (H) 03/01/2023    CREATININE 1.78 (H) 02/01/2022    CREATININE 1.50 (H) 07/30/2021       CBC:  Lab Results   Component Value Date    WBC 5.7 03/01/2023    WBC 7.5 02/01/2022    WBC 5.3 07/30/2021    RBC 2.40 (L) 03/01/2023    RBC 2.80 (L) 02/01/2022    RBC 2.53 (L) 07/30/2021    HGB 7.9 (L) 03/01/2023    HGB 8.7 (L) 02/01/2022    HGB 8.1 (L) 07/30/2021    HCT 25.9 (L) 03/01/2023    HCT 28.4 (L) 02/01/2022    HCT 26.3 (L) 07/30/2021     (H) 03/01/2023     (H) 02/01/2022     (H) 07/30/2021    MCHC 30.5 (L) 03/01/2023    MCHC 30.6 (L) 02/01/2022    MCHC 30.8 (L) 07/30/2021    RDW 13.6 03/01/2023    RDW 14.6 (H) 02/01/2022    RDW 13.5 07/30/2021     03/01/2023     02/01/2022     (L) 07/30/2021       Cardiac Enzymes:    No results found for: \"TROPHS\"    Hepatic Function Panel:    Lab Results   Component Value Date    ALKPHOS 77 03/01/2023    ALT 18 03/01/2023    AST 19 03/01/2023    PROT 6.9 03/01/2023    BILITOT 0.4 03/01/2023           Karan Ashton, DO        "

## 2024-02-21 NOTE — PATIENT INSTRUCTIONS
ECHO ORDERED IN 1 YEAR    FOLLOW UP IN 1 YEAR  OBTAIN LAB WORK PRIOR TO THIS VISIT, THIS WILL BE FASTING  COMPRESSION STOCKINGS ORDERED

## 2024-03-20 NOTE — PROGRESS NOTES
"Subjective   Patient ID: Bora Moreno is a 89 y.o. female who presents for Coronary Artery Disease, Hypertension, and Hyperlipidemia.  HPI  Patient here for checkup visit    Coronary disease and atrial fibrillation stable  Keep follow-up with cardiology  Peripheral vascular disease stable  No new or worsening issues    Dementia stable  No progression or worsening    Cachexia stable weight has been holding steady    Chronic kidney disease also stable    No other complaints  Patient here today with spouse  Review of Systems   Constitutional:  Negative for activity change and fatigue.   HENT:  Negative for congestion and sore throat.    Eyes:  Negative for discharge.   Respiratory:  Negative for cough, chest tightness and shortness of breath.    Cardiovascular:  Negative for chest pain and leg swelling.   Gastrointestinal:  Negative for abdominal pain, blood in stool, constipation, diarrhea, nausea and vomiting.   Endocrine: Negative for cold intolerance and heat intolerance.   Genitourinary:  Negative for difficulty urinating and hematuria.   Musculoskeletal:  Negative for arthralgias, back pain, gait problem, myalgias and neck pain.   Allergic/Immunologic: Negative for environmental allergies.   Neurological:  Negative for dizziness, syncope, weakness, numbness and headaches.   Hematological:  Negative for adenopathy. Does not bruise/bleed easily.   Psychiatric/Behavioral:  Negative for dysphoric mood. The patient is not nervous/anxious.    All other systems reviewed and are negative.      Objective   /66 (BP Location: Right arm, BP Cuff Size: Adult)   Pulse 98   Ht 1.626 m (5' 4\")   Wt 51.3 kg (113 lb 3.2 oz)   SpO2 91%   BMI 19.43 kg/m²    Physical Exam  Vitals and nursing note reviewed.   Constitutional:       General: She is not in acute distress.     Appearance: Normal appearance.   HENT:      Head: Normocephalic and atraumatic.      Right Ear: Tympanic membrane, ear canal and external ear " normal.      Left Ear: Tympanic membrane, ear canal and external ear normal.      Nose: Nose normal.      Mouth/Throat:      Mouth: Mucous membranes are moist.      Pharynx: Oropharynx is clear. No oropharyngeal exudate or posterior oropharyngeal erythema.   Eyes:      Extraocular Movements: Extraocular movements intact.      Conjunctiva/sclera: Conjunctivae normal.      Pupils: Pupils are equal, round, and reactive to light.   Cardiovascular:      Rate and Rhythm: Normal rate and regular rhythm.      Pulses: Normal pulses.      Heart sounds: Normal heart sounds. No murmur heard.  Pulmonary:      Effort: Pulmonary effort is normal. No respiratory distress.      Breath sounds: Normal breath sounds. No wheezing or rales.   Abdominal:      General: Abdomen is flat. Bowel sounds are normal. There is no distension.      Palpations: Abdomen is soft. There is no mass.      Tenderness: There is no abdominal tenderness.   Musculoskeletal:         General: No swelling or deformity. Normal range of motion.      Cervical back: Normal range of motion and neck supple.      Right lower leg: No edema.      Left lower leg: No edema.   Lymphadenopathy:      Cervical: No cervical adenopathy.   Skin:     General: Skin is warm and dry.      Capillary Refill: Capillary refill takes less than 2 seconds.      Findings: No lesion or rash.   Neurological:      General: No focal deficit present.      Mental Status: She is alert and oriented to person, place, and time.      Cranial Nerves: No cranial nerve deficit.      Motor: No weakness.   Psychiatric:         Mood and Affect: Mood normal.         Behavior: Behavior normal.         Thought Content: Thought content normal.         Judgment: Judgment normal.         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  Stay current with age appropriate health maintenance as instructed.  Appointment here or ER with new or worsening symptoms'  Keep appropriate follow-up visit.  Stay current with proper immunizations   Recheck 3 months and as needed  Keep specialist follow-up  Report new or worsening symptoms  Discussed at length with patient and spouse

## 2024-03-26 ENCOUNTER — APPOINTMENT (OUTPATIENT)
Dept: PRIMARY CARE | Facility: CLINIC | Age: 89
End: 2024-03-26
Payer: MEDICARE

## 2024-03-29 ENCOUNTER — OFFICE VISIT (OUTPATIENT)
Dept: PRIMARY CARE | Facility: CLINIC | Age: 89
End: 2024-03-29
Payer: MEDICARE

## 2024-03-29 VITALS
SYSTOLIC BLOOD PRESSURE: 116 MMHG | DIASTOLIC BLOOD PRESSURE: 66 MMHG | HEIGHT: 64 IN | WEIGHT: 113.2 LBS | BODY MASS INDEX: 19.33 KG/M2 | OXYGEN SATURATION: 91 % | HEART RATE: 98 BPM

## 2024-03-29 DIAGNOSIS — F03.B0 MODERATE DEMENTIA WITHOUT BEHAVIORAL DISTURBANCE, PSYCHOTIC DISTURBANCE, MOOD DISTURBANCE, OR ANXIETY, UNSPECIFIED DEMENTIA TYPE (MULTI): ICD-10-CM

## 2024-03-29 DIAGNOSIS — N18.32 CKD STAGE G3B/A2, GFR 30-44 AND ALBUMIN CREATININE RATIO 30-299 MG/G (MULTI): ICD-10-CM

## 2024-03-29 DIAGNOSIS — R64 CACHEXIA (MULTI): ICD-10-CM

## 2024-03-29 DIAGNOSIS — D69.2 OTHER NONTHROMBOCYTOPENIC PURPURA (CMS-HCC): ICD-10-CM

## 2024-03-29 DIAGNOSIS — D61.818 OTHER PANCYTOPENIA (MULTI): ICD-10-CM

## 2024-03-29 DIAGNOSIS — I48.91 ATRIAL FIBRILLATION, UNSPECIFIED TYPE (MULTI): ICD-10-CM

## 2024-03-29 DIAGNOSIS — I25.10 CORONARY ARTERY DISEASE INVOLVING NATIVE CORONARY ARTERY OF NATIVE HEART WITHOUT ANGINA PECTORIS: Primary | ICD-10-CM

## 2024-03-29 DIAGNOSIS — I73.9 PERIPHERAL VASCULAR DISEASE, UNSPECIFIED (CMS-HCC): ICD-10-CM

## 2024-03-29 PROCEDURE — 3078F DIAST BP <80 MM HG: CPT | Performed by: FAMILY MEDICINE

## 2024-03-29 PROCEDURE — 3074F SYST BP LT 130 MM HG: CPT | Performed by: FAMILY MEDICINE

## 2024-03-29 PROCEDURE — 1159F MED LIST DOCD IN RCRD: CPT | Performed by: FAMILY MEDICINE

## 2024-03-29 PROCEDURE — 1160F RVW MEDS BY RX/DR IN RCRD: CPT | Performed by: FAMILY MEDICINE

## 2024-03-29 PROCEDURE — 1036F TOBACCO NON-USER: CPT | Performed by: FAMILY MEDICINE

## 2024-03-29 PROCEDURE — 99214 OFFICE O/P EST MOD 30 MIN: CPT | Performed by: FAMILY MEDICINE

## 2024-03-29 ASSESSMENT — ENCOUNTER SYMPTOMS
VOMITING: 0
MYALGIAS: 0
DYSPHORIC MOOD: 0
DIFFICULTY URINATING: 0
BLOOD IN STOOL: 0
NECK PAIN: 0
ADENOPATHY: 0
BACK PAIN: 0
DIARRHEA: 0
COUGH: 0
HEMATURIA: 0
NERVOUS/ANXIOUS: 0
CONSTIPATION: 0
FATIGUE: 0
ACTIVITY CHANGE: 0
BRUISES/BLEEDS EASILY: 0
ABDOMINAL PAIN: 0
CHEST TIGHTNESS: 0
DIZZINESS: 0
NAUSEA: 0
HEADACHES: 0
WEAKNESS: 0
ARTHRALGIAS: 0
NUMBNESS: 0
SORE THROAT: 0
EYE DISCHARGE: 0
SHORTNESS OF BREATH: 0

## 2024-04-23 DIAGNOSIS — E78.2 MIXED HYPERLIPIDEMIA: Primary | ICD-10-CM

## 2024-04-23 NOTE — TELEPHONE ENCOUNTER
Received request for prescription refills for patient.   Patient follows with Dr. Ashton    Last OV 2/21/24  Next OV 2/19/25    Pended for signing and sent to provider

## 2024-04-24 RX ORDER — ATORVASTATIN CALCIUM 20 MG/1
20 TABLET, FILM COATED ORAL NIGHTLY
Qty: 90 TABLET | Refills: 3 | Status: SHIPPED | OUTPATIENT
Start: 2024-04-24 | End: 2025-04-24

## 2024-06-26 NOTE — PROGRESS NOTES
"Subjective   Patient ID: Bora Moreno is a 89 y.o. female who presents for Follow-up (3 month follow up on Dementia.).  HPI  Dementia stable  No progression or worsening  Home situation stable    Asthma stable no cough or wheeze or shortness of breath    Osteoarthritis controlled multiple joints    High cholesterol monitor  Watch diet follow blood work    Hypertension with good control today  No chest pain or shortness of breath    Coronary disease stable no angina  Atrial fibrillation stable  Keep follow-up with cardiology    Patient here with spouse    Practitioner recommended to have booster zoster vaccination and she will consider  Review of Systems   Constitutional:  Negative for activity change and fatigue.   HENT:  Negative for congestion and sore throat.    Eyes:  Negative for discharge.   Respiratory:  Negative for cough, chest tightness and shortness of breath.    Cardiovascular:  Negative for chest pain and leg swelling.   Gastrointestinal:  Negative for abdominal pain, blood in stool, constipation, diarrhea, nausea and vomiting.   Endocrine: Negative for cold intolerance and heat intolerance.   Genitourinary:  Negative for difficulty urinating and hematuria.   Musculoskeletal:  Negative for arthralgias, back pain, gait problem, myalgias and neck pain.   Allergic/Immunologic: Negative for environmental allergies.   Neurological:  Negative for dizziness, syncope, weakness, numbness and headaches.   Hematological:  Negative for adenopathy. Does not bruise/bleed easily.   Psychiatric/Behavioral:  Negative for dysphoric mood. The patient is not nervous/anxious.    All other systems reviewed and are negative.      Objective   /60   Pulse 107   Temp 36.5 °C (97.7 °F)   Resp 14   Ht 1.626 m (5' 4\")   Wt 49.9 kg (110 lb)   SpO2 100%   BMI 18.88 kg/m²    Physical Exam  Vitals and nursing note reviewed.   Constitutional:       General: She is not in acute distress.     Appearance: Normal " appearance.   HENT:      Head: Normocephalic and atraumatic.      Right Ear: Tympanic membrane, ear canal and external ear normal.      Left Ear: Tympanic membrane, ear canal and external ear normal.      Nose: Nose normal.      Mouth/Throat:      Mouth: Mucous membranes are moist.      Pharynx: Oropharynx is clear. No oropharyngeal exudate or posterior oropharyngeal erythema.   Eyes:      Extraocular Movements: Extraocular movements intact.      Conjunctiva/sclera: Conjunctivae normal.      Pupils: Pupils are equal, round, and reactive to light.   Cardiovascular:      Rate and Rhythm: Normal rate and regular rhythm.      Pulses: Normal pulses.      Heart sounds: Normal heart sounds. No murmur heard.  Pulmonary:      Effort: Pulmonary effort is normal. No respiratory distress.      Breath sounds: Normal breath sounds. No wheezing or rales.   Abdominal:      General: Abdomen is flat. Bowel sounds are normal. There is no distension.      Palpations: Abdomen is soft. There is no mass.      Tenderness: There is no abdominal tenderness.   Musculoskeletal:         General: No swelling or deformity. Normal range of motion.      Cervical back: Normal range of motion and neck supple.      Right lower leg: No edema.      Left lower leg: No edema.   Lymphadenopathy:      Cervical: No cervical adenopathy.   Skin:     General: Skin is warm and dry.      Capillary Refill: Capillary refill takes less than 2 seconds.      Findings: No lesion or rash.   Neurological:      General: No focal deficit present.      Mental Status: She is alert and oriented to person, place, and time.      Cranial Nerves: No cranial nerve deficit.      Motor: No weakness.   Psychiatric:         Mood and Affect: Mood normal.         Behavior: Behavior normal.         Thought Content: Thought content normal.         Judgment: Judgment normal.         Assessment/Plan   Problem List Items Addressed This Visit       Coronary artery disease involving native  coronary artery of native heart without angina pectoris    Primary osteoarthritis involving multiple joints    Essential hypertension    Mixed hyperlipidemia    Mild persistent asthma without complication (HHS-HCC)    Moderate dementia without behavioral disturbance, psychotic disturbance, mood disturbance, or anxiety, unspecified dementia type (Multi) - Primary    Atrial fibrillation, unspecified type (Multi)       Patient education provided.  Stay current with age appropriate health maintenance as instructed.  Appointment here or ER with new or worsening symptoms'  Keep appropriate follow-up visit.  Stay current with proper immunizations   3 months  D/w spouse

## 2024-07-01 ENCOUNTER — APPOINTMENT (OUTPATIENT)
Dept: PRIMARY CARE | Facility: CLINIC | Age: 89
End: 2024-07-01
Payer: MEDICARE

## 2024-07-01 VITALS
TEMPERATURE: 97.7 F | RESPIRATION RATE: 14 BRPM | HEIGHT: 64 IN | WEIGHT: 110 LBS | SYSTOLIC BLOOD PRESSURE: 135 MMHG | HEART RATE: 107 BPM | BODY MASS INDEX: 18.78 KG/M2 | DIASTOLIC BLOOD PRESSURE: 60 MMHG | OXYGEN SATURATION: 100 %

## 2024-07-01 DIAGNOSIS — I25.10 CORONARY ARTERY DISEASE INVOLVING NATIVE CORONARY ARTERY OF NATIVE HEART WITHOUT ANGINA PECTORIS: ICD-10-CM

## 2024-07-01 DIAGNOSIS — I10 ESSENTIAL HYPERTENSION: ICD-10-CM

## 2024-07-01 DIAGNOSIS — F03.B0 MODERATE DEMENTIA WITHOUT BEHAVIORAL DISTURBANCE, PSYCHOTIC DISTURBANCE, MOOD DISTURBANCE, OR ANXIETY, UNSPECIFIED DEMENTIA TYPE (MULTI): Primary | ICD-10-CM

## 2024-07-01 DIAGNOSIS — J45.30 MILD PERSISTENT ASTHMA WITHOUT COMPLICATION (HHS-HCC): ICD-10-CM

## 2024-07-01 DIAGNOSIS — E78.2 MIXED HYPERLIPIDEMIA: ICD-10-CM

## 2024-07-01 DIAGNOSIS — I48.91 ATRIAL FIBRILLATION, UNSPECIFIED TYPE (MULTI): ICD-10-CM

## 2024-07-01 DIAGNOSIS — M15.9 PRIMARY OSTEOARTHRITIS INVOLVING MULTIPLE JOINTS: ICD-10-CM

## 2024-07-01 PROCEDURE — 1159F MED LIST DOCD IN RCRD: CPT | Performed by: FAMILY MEDICINE

## 2024-07-01 PROCEDURE — 1123F ACP DISCUSS/DSCN MKR DOCD: CPT | Performed by: FAMILY MEDICINE

## 2024-07-01 PROCEDURE — 3078F DIAST BP <80 MM HG: CPT | Performed by: FAMILY MEDICINE

## 2024-07-01 PROCEDURE — 3075F SYST BP GE 130 - 139MM HG: CPT | Performed by: FAMILY MEDICINE

## 2024-07-01 PROCEDURE — 1160F RVW MEDS BY RX/DR IN RCRD: CPT | Performed by: FAMILY MEDICINE

## 2024-07-01 PROCEDURE — 99214 OFFICE O/P EST MOD 30 MIN: CPT | Performed by: FAMILY MEDICINE

## 2024-07-01 PROCEDURE — 1036F TOBACCO NON-USER: CPT | Performed by: FAMILY MEDICINE

## 2024-07-01 ASSESSMENT — ENCOUNTER SYMPTOMS
BRUISES/BLEEDS EASILY: 0
NAUSEA: 0
ACTIVITY CHANGE: 0
DYSPHORIC MOOD: 0
HEMATURIA: 0
NECK PAIN: 0
HEADACHES: 0
SHORTNESS OF BREATH: 0
DIFFICULTY URINATING: 0
ARTHRALGIAS: 0
NUMBNESS: 0
SORE THROAT: 0
BLOOD IN STOOL: 0
ABDOMINAL PAIN: 0
CHEST TIGHTNESS: 0
EYE DISCHARGE: 0
NERVOUS/ANXIOUS: 0
WEAKNESS: 0
DIZZINESS: 0
FATIGUE: 0
BACK PAIN: 0
CONSTIPATION: 0
VOMITING: 0
ADENOPATHY: 0
MYALGIAS: 0
COUGH: 0
DIARRHEA: 0

## 2024-07-25 DIAGNOSIS — I10 ESSENTIAL HYPERTENSION: ICD-10-CM

## 2024-07-25 RX ORDER — VALSARTAN AND HYDROCHLOROTHIAZIDE 80; 12.5 MG/1; MG/1
TABLET, FILM COATED ORAL
Qty: 45 TABLET | Refills: 3 | Status: SHIPPED | OUTPATIENT
Start: 2024-07-25

## 2024-07-25 NOTE — TELEPHONE ENCOUNTER
Rx Refill Request     Name: Bora Moreno  :  1935   Medication Name:  valsartan-hydrochlorothiazide (Diovan-HCT) 80-12.5 mg tablet   Specific Pharmacy location:  [facility]   Date of last appointment:  2024   Date of next appointment:  10/1/2024   Best number to reach patient:  [phone]

## 2024-08-19 DIAGNOSIS — J45.30 MILD PERSISTENT ASTHMA WITHOUT COMPLICATION (HHS-HCC): ICD-10-CM

## 2024-08-19 RX ORDER — FLUTICASONE FUROATE AND VILANTEROL TRIFENATATE 200; 25 UG/1; UG/1
POWDER RESPIRATORY (INHALATION) DAILY
Qty: 2 EACH | Refills: 3 | Status: SHIPPED | OUTPATIENT
Start: 2024-08-19

## 2024-08-19 NOTE — TELEPHONE ENCOUNTER
Rx Refill Request     Name: Bora Moreno  :  1935   Medication Name:  fluticasone furoate-vilanteroL (Breo Ellipta) 200-25 mcg/dose inhaler   Specific Pharmacy location:  [facility]   Date of last appointment:  2024   Date of next appointment:  10/1/2024   Best number to reach patient:  [phone]

## 2024-09-16 ENCOUNTER — APPOINTMENT (OUTPATIENT)
Dept: CARDIOLOGY | Facility: HOSPITAL | Age: 89
End: 2024-09-16
Payer: MEDICARE

## 2024-09-16 ENCOUNTER — APPOINTMENT (OUTPATIENT)
Dept: RADIOLOGY | Facility: HOSPITAL | Age: 89
End: 2024-09-16
Payer: MEDICARE

## 2024-09-16 ENCOUNTER — HOSPITAL ENCOUNTER (INPATIENT)
Facility: HOSPITAL | Age: 89
LOS: 4 days | Discharge: HOME | End: 2024-09-20
Attending: EMERGENCY MEDICINE | Admitting: INTERNAL MEDICINE
Payer: MEDICARE

## 2024-09-16 DIAGNOSIS — R69 MULTIPLE COMORBID CONDITIONS: ICD-10-CM

## 2024-09-16 DIAGNOSIS — R06.00 DYSPNEA, UNSPECIFIED TYPE: ICD-10-CM

## 2024-09-16 DIAGNOSIS — J18.9 PNEUMONIA DUE TO INFECTIOUS ORGANISM, UNSPECIFIED LATERALITY, UNSPECIFIED PART OF LUNG: Primary | ICD-10-CM

## 2024-09-16 DIAGNOSIS — R31.9 URINARY TRACT INFECTION WITH HEMATURIA, SITE UNSPECIFIED: ICD-10-CM

## 2024-09-16 DIAGNOSIS — D50.8 OTHER IRON DEFICIENCY ANEMIA: ICD-10-CM

## 2024-09-16 DIAGNOSIS — N39.0 URINARY TRACT INFECTION WITH HEMATURIA, SITE UNSPECIFIED: ICD-10-CM

## 2024-09-16 DIAGNOSIS — D64.9 ANEMIA, UNSPECIFIED TYPE: ICD-10-CM

## 2024-09-16 LAB
ABO GROUP (TYPE) IN BLOOD: NORMAL
ALBUMIN SERPL BCP-MCNC: 3.5 G/DL (ref 3.4–5)
ALP SERPL-CCNC: 100 U/L (ref 33–136)
ALT SERPL W P-5'-P-CCNC: 12 U/L (ref 7–45)
ANION GAP SERPL CALC-SCNC: 17 MMOL/L (ref 10–20)
ANTIBODY SCREEN: NORMAL
APPEARANCE UR: ABNORMAL
AST SERPL W P-5'-P-CCNC: 15 U/L (ref 9–39)
BACTERIA #/AREA URNS AUTO: ABNORMAL /HPF
BASOPHILS # BLD AUTO: 0.02 X10*3/UL (ref 0–0.1)
BASOPHILS NFR BLD AUTO: 0.3 %
BILIRUB SERPL-MCNC: 0.3 MG/DL (ref 0–1.2)
BILIRUB UR STRIP.AUTO-MCNC: NEGATIVE MG/DL
BNP SERPL-MCNC: 182 PG/ML (ref 0–99)
BUN SERPL-MCNC: 37 MG/DL (ref 6–23)
CALCIUM SERPL-MCNC: 8.7 MG/DL (ref 8.6–10.3)
CARDIAC TROPONIN I PNL SERPL HS: 21 NG/L (ref 0–13)
CARDIAC TROPONIN I PNL SERPL HS: 22 NG/L (ref 0–13)
CHLORIDE SERPL-SCNC: 107 MMOL/L (ref 98–107)
CO2 SERPL-SCNC: 20 MMOL/L (ref 21–32)
COLOR UR: ABNORMAL
CREAT SERPL-MCNC: 2.25 MG/DL (ref 0.5–1.05)
D DIMER PPP FEU-MCNC: 562 NG/ML FEU
EGFRCR SERPLBLD CKD-EPI 2021: 21 ML/MIN/1.73M*2
EOSINOPHIL # BLD AUTO: 0.31 X10*3/UL (ref 0–0.4)
EOSINOPHIL NFR BLD AUTO: 4.3 %
ERYTHROCYTE [DISTWIDTH] IN BLOOD BY AUTOMATED COUNT: 15.4 % (ref 11.5–14.5)
FLUAV RNA RESP QL NAA+PROBE: NOT DETECTED
FLUBV RNA RESP QL NAA+PROBE: NOT DETECTED
GLUCOSE SERPL-MCNC: 109 MG/DL (ref 74–99)
GLUCOSE UR STRIP.AUTO-MCNC: NORMAL MG/DL
HCT VFR BLD AUTO: 19.2 % (ref 36–46)
HGB BLD-MCNC: 5.9 G/DL (ref 12–16)
HOLD SPECIMEN: NORMAL
HYPOCHROMIA BLD QL SMEAR: NORMAL
IMM GRANULOCYTES # BLD AUTO: 0.17 X10*3/UL (ref 0–0.5)
IMM GRANULOCYTES NFR BLD AUTO: 2.3 % (ref 0–0.9)
KETONES UR STRIP.AUTO-MCNC: NEGATIVE MG/DL
LEUKOCYTE ESTERASE UR QL STRIP.AUTO: ABNORMAL
LYMPHOCYTES # BLD AUTO: 1.09 X10*3/UL (ref 0.8–3)
LYMPHOCYTES NFR BLD AUTO: 15 %
MAGNESIUM SERPL-MCNC: 1.92 MG/DL (ref 1.6–2.4)
MCH RBC QN AUTO: 32.8 PG (ref 26–34)
MCHC RBC AUTO-ENTMCNC: 30.7 G/DL (ref 32–36)
MCV RBC AUTO: 107 FL (ref 80–100)
MONOCYTES # BLD AUTO: 1.1 X10*3/UL (ref 0.05–0.8)
MONOCYTES NFR BLD AUTO: 15.2 %
NEUTROPHILS # BLD AUTO: 4.57 X10*3/UL (ref 1.6–5.5)
NEUTROPHILS NFR BLD AUTO: 62.9 %
NITRITE UR QL STRIP.AUTO: NEGATIVE
NRBC BLD-RTO: 0 /100 WBCS (ref 0–0)
OVALOCYTES BLD QL SMEAR: NORMAL
PH UR STRIP.AUTO: 5 [PH]
PLATELET # BLD AUTO: 202 X10*3/UL (ref 150–450)
POTASSIUM SERPL-SCNC: 4.7 MMOL/L (ref 3.5–5.3)
PROT SERPL-MCNC: 7.9 G/DL (ref 6.4–8.2)
PROT UR STRIP.AUTO-MCNC: ABNORMAL MG/DL
RBC # BLD AUTO: 1.8 X10*6/UL (ref 4–5.2)
RBC # UR STRIP.AUTO: ABNORMAL /UL
RBC #/AREA URNS AUTO: ABNORMAL /HPF
RBC MORPH BLD: NORMAL
RH FACTOR (ANTIGEN D): NORMAL
SARS-COV-2 RNA RESP QL NAA+PROBE: NOT DETECTED
SCHISTOCYTES BLD QL SMEAR: NORMAL
SODIUM SERPL-SCNC: 139 MMOL/L (ref 136–145)
SP GR UR STRIP.AUTO: 1.01
UROBILINOGEN UR STRIP.AUTO-MCNC: NORMAL MG/DL
WBC # BLD AUTO: 7.3 X10*3/UL (ref 4.4–11.3)
WBC #/AREA URNS AUTO: >50 /HPF
WBC CLUMPS #/AREA URNS AUTO: ABNORMAL /HPF

## 2024-09-16 PROCEDURE — 83880 ASSAY OF NATRIURETIC PEPTIDE: CPT | Performed by: EMERGENCY MEDICINE

## 2024-09-16 PROCEDURE — 85025 COMPLETE CBC W/AUTO DIFF WBC: CPT | Performed by: EMERGENCY MEDICINE

## 2024-09-16 PROCEDURE — 86901 BLOOD TYPING SEROLOGIC RH(D): CPT | Performed by: EMERGENCY MEDICINE

## 2024-09-16 PROCEDURE — 85379 FIBRIN DEGRADATION QUANT: CPT | Performed by: EMERGENCY MEDICINE

## 2024-09-16 PROCEDURE — 93005 ELECTROCARDIOGRAM TRACING: CPT

## 2024-09-16 PROCEDURE — 99291 CRITICAL CARE FIRST HOUR: CPT | Mod: CS | Performed by: EMERGENCY MEDICINE

## 2024-09-16 PROCEDURE — 71045 X-RAY EXAM CHEST 1 VIEW: CPT | Performed by: RADIOLOGY

## 2024-09-16 PROCEDURE — P9016 RBC LEUKOCYTES REDUCED: HCPCS

## 2024-09-16 PROCEDURE — 81001 URINALYSIS AUTO W/SCOPE: CPT | Performed by: EMERGENCY MEDICINE

## 2024-09-16 PROCEDURE — 87040 BLOOD CULTURE FOR BACTERIA: CPT | Mod: ELYLAB | Performed by: EMERGENCY MEDICINE

## 2024-09-16 PROCEDURE — 71045 X-RAY EXAM CHEST 1 VIEW: CPT

## 2024-09-16 PROCEDURE — 84484 ASSAY OF TROPONIN QUANT: CPT | Performed by: EMERGENCY MEDICINE

## 2024-09-16 PROCEDURE — 36415 COLL VENOUS BLD VENIPUNCTURE: CPT | Performed by: EMERGENCY MEDICINE

## 2024-09-16 PROCEDURE — 80053 COMPREHEN METABOLIC PANEL: CPT | Performed by: EMERGENCY MEDICINE

## 2024-09-16 PROCEDURE — 36430 TRANSFUSION BLD/BLD COMPNT: CPT

## 2024-09-16 PROCEDURE — 87186 SC STD MICRODIL/AGAR DIL: CPT | Mod: ELYLAB | Performed by: EMERGENCY MEDICINE

## 2024-09-16 PROCEDURE — 87086 URINE CULTURE/COLONY COUNT: CPT | Mod: ELYLAB | Performed by: EMERGENCY MEDICINE

## 2024-09-16 PROCEDURE — 2500000004 HC RX 250 GENERAL PHARMACY W/ HCPCS (ALT 636 FOR OP/ED): Performed by: EMERGENCY MEDICINE

## 2024-09-16 PROCEDURE — 99223 1ST HOSP IP/OBS HIGH 75: CPT | Performed by: NURSE PRACTITIONER

## 2024-09-16 PROCEDURE — 83735 ASSAY OF MAGNESIUM: CPT | Performed by: EMERGENCY MEDICINE

## 2024-09-16 PROCEDURE — 1210000001 HC SEMI-PRIVATE ROOM DAILY

## 2024-09-16 PROCEDURE — 2500000002 HC RX 250 W HCPCS SELF ADMINISTERED DRUGS (ALT 637 FOR MEDICARE OP, ALT 636 FOR OP/ED): Performed by: EMERGENCY MEDICINE

## 2024-09-16 PROCEDURE — 87636 SARSCOV2 & INF A&B AMP PRB: CPT | Performed by: EMERGENCY MEDICINE

## 2024-09-16 PROCEDURE — 96367 TX/PROPH/DG ADDL SEQ IV INF: CPT

## 2024-09-16 PROCEDURE — 94640 AIRWAY INHALATION TREATMENT: CPT

## 2024-09-16 PROCEDURE — 96365 THER/PROPH/DIAG IV INF INIT: CPT

## 2024-09-16 PROCEDURE — 86923 COMPATIBILITY TEST ELECTRIC: CPT

## 2024-09-16 RX ORDER — CEFTRIAXONE 1 G/50ML
1 INJECTION, SOLUTION INTRAVENOUS EVERY 24 HOURS
Status: DISCONTINUED | OUTPATIENT
Start: 2024-09-17 | End: 2024-09-20 | Stop reason: HOSPADM

## 2024-09-16 RX ORDER — ALBUTEROL SULFATE 0.83 MG/ML
2.5 SOLUTION RESPIRATORY (INHALATION) ONCE
Status: COMPLETED | OUTPATIENT
Start: 2024-09-16 | End: 2024-09-16

## 2024-09-16 RX ORDER — IPRATROPIUM BROMIDE AND ALBUTEROL SULFATE 2.5; .5 MG/3ML; MG/3ML
3 SOLUTION RESPIRATORY (INHALATION) ONCE
Status: COMPLETED | OUTPATIENT
Start: 2024-09-16 | End: 2024-09-16

## 2024-09-16 ASSESSMENT — LIFESTYLE VARIABLES
TOTAL SCORE: 0
HAVE PEOPLE ANNOYED YOU BY CRITICIZING YOUR DRINKING: NO
HAVE YOU EVER FELT YOU SHOULD CUT DOWN ON YOUR DRINKING: NO
EVER FELT BAD OR GUILTY ABOUT YOUR DRINKING: NO
EVER HAD A DRINK FIRST THING IN THE MORNING TO STEADY YOUR NERVES TO GET RID OF A HANGOVER: NO

## 2024-09-16 ASSESSMENT — COLUMBIA-SUICIDE SEVERITY RATING SCALE - C-SSRS
6. HAVE YOU EVER DONE ANYTHING, STARTED TO DO ANYTHING, OR PREPARED TO DO ANYTHING TO END YOUR LIFE?: NO
1. IN THE PAST MONTH, HAVE YOU WISHED YOU WERE DEAD OR WISHED YOU COULD GO TO SLEEP AND NOT WAKE UP?: NO
2. HAVE YOU ACTUALLY HAD ANY THOUGHTS OF KILLING YOURSELF?: NO

## 2024-09-16 ASSESSMENT — PAIN SCALES - GENERAL
PAINLEVEL_OUTOF10: 0 - NO PAIN
PAINLEVEL_OUTOF10: 0 - NO PAIN

## 2024-09-16 ASSESSMENT — PAIN - FUNCTIONAL ASSESSMENT: PAIN_FUNCTIONAL_ASSESSMENT: 0-10

## 2024-09-16 NOTE — ED PROVIDER NOTES
89-year-old female presents emergency department with chief complaint of shortness of breath and dry cough x 2 to 3 days.  She denies any fevers.  No chest pain, abdominal pain, nausea, vomiting, dysuria, diarrhea, constipation, black or bloody stools.  Patient does admit to previous tobacco use and states that she uses Breo for her breathing. She is not on any anticoagulants.  Chart review shows significant past medical history of anemia, CAD, bladder prolapse, hypertension, hydronephrosis, TAVR, mitral valve regurg, dementia, peripheral vascular disease, COPD, and atrial fibrillation.  No reported illicit drug use or regular alcohol use.      History provided by:  Patient and spouse  History limited by:  Dementia   used: No           ------------------------------------------------------------------------------------------------------------------------------------------    VS: As documented in the triage note and EMR flowsheet from this visit were reviewed.    Review of Systems  Constitutional: no fever, chills reports fatigue  Eyes: no redness, discharge, pain  HENT: no sore throat, nose bleeds, congestion, rhinorrhea   Cardiovascular: no chest pain, leg edema, palpitations  Respiratory: Admits to shortness of breath and cough  GI: no nausea, diarrhea, pain, vomiting, constipation, BRBPR, melena  : no dysuria, frequency, hematuria  Musculoskeletal: no neck pain, stiffness,  no joint deformity, swelling  Skin: no rash, erythema, wounds  Neurological: no headache, dizziness, lightheadedness, weakness, numbness, or tingling  Psychiatric: no suicidal thoughts, agitation reportedly has history of dementia  Metabolic: no polyuria or polydipsia  Hematologic: no increased bleeding or bruising  Immunology: No immunocompromise state    [facility]  Nursing notes reviewed and confirmed by me.  Chart review performed including medications, allergies, and medical, surgical, and family history  Visit Vitals  BP  173/76   Pulse 100   Temp 36.6 °C (97.9 °F) (Temporal)   Resp 20     Physical Exam  Vitals and nursing note reviewed.   Constitutional:       General: She is not in acute distress.     Appearance: Normal appearance. She is not ill-appearing.   HENT:      Head: Normocephalic and atraumatic.      Right Ear: External ear normal.      Left Ear: External ear normal.      Nose: Nose normal. No congestion or rhinorrhea.      Mouth/Throat:      Mouth: Mucous membranes are dry.      Pharynx: No oropharyngeal exudate or posterior oropharyngeal erythema.   Eyes:      Extraocular Movements: Extraocular movements intact.      Conjunctiva/sclera: Conjunctivae normal.      Pupils: Pupils are equal, round, and reactive to light.   Cardiovascular:      Rate and Rhythm: Normal rate and regular rhythm.      Pulses: Normal pulses.      Heart sounds: Normal heart sounds.   Pulmonary:      Effort: Pulmonary effort is normal. No respiratory distress.      Breath sounds: No stridor. Rhonchi and rales present. No wheezing.      Comments: Coarse breath sounds with rhonchi and crackles in the bases.  Dry cough on my exam  Abdominal:      General: There is no distension.      Palpations: Abdomen is soft.      Tenderness: There is no abdominal tenderness. There is no guarding or rebound.   Musculoskeletal:         General: No swelling or deformity. Normal range of motion.      Cervical back: Normal range of motion and neck supple. No rigidity.      Right lower leg: No edema.      Left lower leg: No edema.      Comments: No calf tenderness    Skin:     General: Skin is warm and dry.      Capillary Refill: Capillary refill takes less than 2 seconds.      Coloration: Skin is not jaundiced.      Findings: No rash.   Neurological:      General: No focal deficit present.      Mental Status: She is alert.      Sensory: No sensory deficit.      Motor: No weakness.      Comments: Patient is alert and interactive answers questions appropriately.    Psychiatric:         Mood and Affect: Mood normal.         Behavior: Behavior normal.        Past Medical History:   Diagnosis Date    COPD (chronic obstructive pulmonary disease) (Multi)     High blood cholesterol     Hypertension     Personal history of other diseases of the circulatory system 2022    History of aortic valve stenosis      Past Surgical History:   Procedure Laterality Date    CARDIAC CATHETERIZATION Left     CATARACT EXTRACTION Right     OTHER SURGICAL HISTORY  2019    Hysterectomy    OTHER SURGICAL HISTORY  2021    Colonoscopy    OTHER SURGICAL HISTORY  2021    Dilation and curettage    OTHER SURGICAL HISTORY  2021    Esophagogastroduodenoscopy    OTHER SURGICAL HISTORY  10/20/2021    Transcatheter aortic valve replacement      Social History     Socioeconomic History    Marital status:    Tobacco Use    Smoking status: Former     Current packs/day: 0.00     Types: Cigarettes     Start date:      Quit date:      Years since quittin.7     Passive exposure: Never    Smokeless tobacco: Never   Vaping Use    Vaping status: Never Used   Substance and Sexual Activity    Alcohol use: Never    Drug use: Never      ------------------------------------------------------------------------------------------------------------------------------------------  XR chest 1 view   Final Result   New area of airspace disease in the medial right lung base could be   some linear atelectasis or possibly a component of middle lobe   collapse or pneumonia.        Signed by: Rudi Nguyễn 2024 6:21 PM   Dictation workstation:   [id number]         Labs Reviewed   CBC WITH AUTO DIFFERENTIAL - Abnormal       Result Value    WBC 7.3      nRBC 0.0      RBC 1.80 (*)     Hemoglobin 5.9 (*)     Hematocrit 19.2 (*)      (*)     MCH 32.8      MCHC 30.7 (*)     RDW 15.4 (*)     Platelets 202      Neutrophils % 62.9      Immature Granulocytes %, Automated 2.3 (*)      Lymphocytes % 15.0      Monocytes % 15.2      Eosinophils % 4.3      Basophils % 0.3      Neutrophils Absolute 4.57      Immature Granulocytes Absolute, Automated 0.17      Lymphocytes Absolute 1.09      Monocytes Absolute 1.10 (*)     Eosinophils Absolute 0.31      Basophils Absolute 0.02     COMPREHENSIVE METABOLIC PANEL - Abnormal    Glucose 109 (*)     Sodium 139      Potassium 4.7      Chloride 107      Bicarbonate 20 (*)     Anion Gap 17      Urea Nitrogen 37 (*)     Creatinine 2.25 (*)     eGFR 21 (*)     Calcium 8.7      Albumin 3.5      Alkaline Phosphatase 100      Total Protein 7.9      AST 15      Bilirubin, Total 0.3      ALT 12     B-TYPE NATRIURETIC PEPTIDE - Abnormal     (*)     Narrative:        <100 pg/mL - Heart failure unlikely  100-299 pg/mL - Intermediate probability of acute heart                  failure exacerbation. Correlate with clinical                  context and patient history.    >=300 pg/mL - Heart Failure likely. Correlate with clinical                  context and patient history.    BNP testing is performed using different testing methodology at [facility] than at other [facility]. Direct result comparisons should only be made within the same method.      D-DIMER, VTE EXCLUSION - Abnormal    D-Dimer, Quantitative VTE Exclusion 562 (*)     Narrative:     The VTE Exclusion D-Dimer assay is reported in ng/mL Fibrinogen Equivalent Units (FEU).    Per 's instructions for use, a value of less than 500 ng/mL (FEU) may help to exclude DVT or PE in outpatients when the assay is used with a clinical pretest probability assessment.(AEMR must utilize and document eCalc 'Wells Score Deep Vein Thrombosis Risk' for DVT exclusion only. Emergency Department should utilize  Guidelines for Emergency Department Use of the VTE Exclusion D-Dimer and Clinical Pretest probability assessment model for DVT or PE exclusion.)   SERIAL TROPONIN-INITIAL - Abnormal     Troponin I, High Sensitivity 21 (*)     Narrative:     Less than 99th percentile of normal range cutoff-  Female and children under 18 years old <14 ng/L; Male <21 ng/L: Negative  Repeat testing should be performed if clinically indicated.     Female and children under 18 years old 14-50 ng/L; Male 21-50 ng/L:  Consistent with possible cardiac damage and possible increased clinical   risk. Serial measurements may help to assess extent of myocardial damage.     >50 ng/L: Consistent with cardiac damage, increased clinical risk and  myocardial infarction. Serial measurements may help assess extent of   myocardial damage.      NOTE: Children less than 1 year old may have higher baseline troponin   levels and results should be interpreted in conjunction with the overall   clinical context.     NOTE: Troponin I testing is performed using a different   testing methodology at [facility] than at other   [facility]. Direct result comparisons should only   be made within the same method.   SERIAL TROPONIN, 1 HOUR - Abnormal    Troponin I, High Sensitivity 22 (*)     Narrative:     Less than 99th percentile of normal range cutoff-  Female and children under 18 years old <14 ng/L; Male <21 ng/L: Negative  Repeat testing should be performed if clinically indicated.     Female and children under 18 years old 14-50 ng/L; Male 21-50 ng/L:  Consistent with possible cardiac damage and possible increased clinical   risk. Serial measurements may help to assess extent of myocardial damage.     >50 ng/L: Consistent with cardiac damage, increased clinical risk and  myocardial infarction. Serial measurements may help assess extent of   myocardial damage.      NOTE: Children less than 1 year old may have higher baseline troponin   levels and results should be interpreted in conjunction with the overall   clinical context.     NOTE: Troponin I testing is performed using a different   testing methodology at [facility] than at other   [facility] hospitals. Direct result comparisons should only   be made within the same method.   URINALYSIS WITH REFLEX CULTURE AND MICROSCOPIC - Abnormal    Color, Urine Light-Orange (*)     Appearance, Urine Ex.Turbid (*)     Specific Gravity, Urine 1.013      pH, Urine 5.0      Protein, Urine 30 (1+) (*)     Glucose, Urine Normal      Blood, Urine 0.06 (1+) (*)     Ketones, Urine NEGATIVE      Bilirubin, Urine NEGATIVE      Urobilinogen, Urine Normal      Nitrite, Urine NEGATIVE      Leukocyte Esterase, Urine 500 Philippe/µL (*)    MICROSCOPIC ONLY, URINE - Abnormal    WBC, Urine >50 (*)     WBC Clumps, Urine MANY      RBC, Urine 6-10 (*)     Bacteria, Urine 4+ (*)    C-REACTIVE PROTEIN - Abnormal    C-Reactive Protein 7.44 (*)    SEDIMENTATION RATE, AUTOMATED - Abnormal    Sedimentation Rate 36 (*)    BLOOD CULTURE - Normal    Blood Culture Loaded on Instrument - Culture in progress     MAGNESIUM - Normal    Magnesium 1.92     SARS-COV-2 PCR - Normal    Coronavirus 2019, PCR Not Detected      Narrative:     This assay has received FDA Emergency Use Authorization (EUA) and is only authorized for the duration of time that circumstances exist to justify the authorization of the emergency use of in vitro diagnostic tests for the detection of SARS-CoV-2 virus and/or diagnosis of COVID-19 infection under section 564(b)(1) of the Act, 21 U.S.C. 360bbb-3(b)(1). This assay is an in vitro diagnostic nucleic acid amplification test for the qualitative detection of SARS-CoV-2 from nasopharyngeal specimens and has been validated for use at [facility]. Negative results do not preclude COVID-19 infections and should not be used as the sole basis for diagnosis, treatment, or other management decisions.     INFLUENZA A AND B PCR - Normal    Flu A Result Not Detected      Flu B Result Not Detected      Narrative:     This assay is an in vitro diagnostic multiplex nucleic acid  amplification test for the detection and discrimination of Influenza A & B from nasopharyngeal specimens, and has been validated for use at [facility]. Negative results do not preclude Influenza A/B infections, and should not be used as the sole basis for diagnosis, treatment, or other management decisions. If Influenza A/B and RSV PCR results are negative, testing for Parainfluenza virus, Adenovirus and Metapneumovirus is routinely performed for [facility] pediatric oncology and intensive care inpatients, and is available on other patients by placing an add-on request.   URINE CULTURE   BLOOD CULTURE   TROPONIN SERIES- (INITIAL, 1 HR)    Narrative:     The following orders were created for panel order Troponin I Series, High Sensitivity (0, 1 HR).  Procedure                               Abnormality         Status                     ---------                               -----------         ------                     Troponin I, High Sensiti...[[id number]]  Abnormal            Final result               Troponin, High Sensitivi...[[id number]]  Abnormal            Final result                 Please view results for these tests on the individual orders.   TYPE AND SCREEN    ABO TYPE A      Rh TYPE POS      ANTIBODY SCREEN NEG     URINALYSIS WITH REFLEX CULTURE AND MICROSCOPIC    Narrative:     The following orders were created for panel order Urinalysis with Reflex Culture and Microscopic.  Procedure                               Abnormality         Status                     ---------                               -----------         ------                     Urinalysis with Reflex C...[[id number]]  Abnormal            Final result               Extra Urine Gray Tube[[id number]]                            In process                   Please view results for these tests on the individual orders.   EXTRA URINE GRAY TUBE   COMPREHENSIVE METABOLIC PANEL   CBC WITH AUTO DIFFERENTIAL   MAGNESIUM    PROCALCITONIN   PREPARE RBC    PRODUCT CODE E5760P39      Unit Number [id number]      Unit ABO A      Unit RH POS      XM INTEP COMP      Dispense Status TR      Blood Expiration Date 10/10/2024 11:59:00 PM EDT      PRODUCT BLOOD TYPE 6200      UNIT VOLUME 350      PRODUCT CODE Q7662J53      Unit Number [id number]      Unit ABO A      Unit RH POS      XM INTEP COMP      Dispense Status XM      Blood Expiration Date 10/9/2024 11:59:00 PM EDT      PRODUCT BLOOD TYPE 6200      UNIT VOLUME 350     MORPHOLOGY    RBC Morphology See Below      Hypochromia Mild      RBC Fragments Few      Ovalocytes Few          Medical Decision Making  EKG interpreted by ED physician: Normal sinus rhythm rate of 96.  CA, QRS, QTc intervals all within normal limits.  No significant ST elevations or depressions.  No significant Q waves.  Good R wave progression.  Normal axis.    Repeat EKG interpreted by ED physician: Sinus tachycardia rate of 111.  CA, QRS, QTc intervals are within normal limits.  No significant ST elevations or depressions.  No significant Q waves.  Good R wave progression.  Normal axis.    89-year-old female presents emergency department with complaints of shortness of breath and cough.  Patient is accompanied by  who aids in providing history.  On my exam the patient is afebrile and nontoxic.  She is appreciated to have coarse breath sounds and dry cough.  She is given aerosol treatment here in the ED.  A thorough workup is obtained given her presenting symptoms.  CBC does not show significant leukocytosis, but does show significant anemia that is down trended.  Patient does not report any black or bloody stools.  Patient is accompanied by  and informed consent is obtained for blood products.  Patient was in agreement with this plan, but requested that her  signed for consent and given her reported history of dementia I do feel this is appropriate.  Blood products are ordered.  Patient  does not have findings of severe sepsis or septic shock.  Cardiac enzyme x 2 is mildly elevated but not significantly increasing.  EKG does not show findings of acute ACS.  UA is concerning for urinary tract infection patient was treated with Rocephin.  COVID and flu testing is negative.  CMP shows acute on chronic kidney injury.  D-dimer is age-adjusted negative making blood clot unlikely.  Chest x-ray is concerning for pneumonia.  Azithromycin is added to the patient's antibiotics for coverage of community-acquired pneumonia.  Given the patient's findings of symptomatic anemia, urinary tract infection, and pneumonia I do recommend admission for further evaluation and treatment.  Patient and family in agreement with this plan.  Case is discussed with hospitalist on-call.       Diagnoses as of 09/17/24 0125   Pneumonia due to infectious organism, unspecified laterality, unspecified part of lung   Urinary tract infection with hematuria, site unspecified   Anemia, unspecified type   Dyspnea, unspecified type      1. Pneumonia due to infectious organism, unspecified laterality, unspecified part of lung        2. Urinary tract infection with hematuria, site unspecified        3. Anemia, unspecified type        4. Dyspnea, unspecified type           Critical Care    Performed by: Shahid Flores DO  Authorized by: Shahid Flores DO    Critical care provider statement:     Critical care time (minutes):  35    Critical care time was exclusive of:  Separately billable procedures and treating other patients    Critical care was necessary to treat or prevent imminent or life-threatening deterioration of the following conditions: symptomatic anemia.    Critical care was time spent personally by me on the following activities:  Development of treatment plan with patient or surrogate, evaluation of patient's response to treatment, examination of patient, pulse oximetry, ordering and review of radiographic studies, ordering and  review of laboratory studies and ordering and performing treatments and interventions    Care discussed with: admitting provider         This note was dictated using dragon software and may contain errors related to dictation interpretation errors.      Shahid Flores,   09/17/24 0125

## 2024-09-17 ENCOUNTER — APPOINTMENT (OUTPATIENT)
Dept: RADIOLOGY | Facility: HOSPITAL | Age: 89
End: 2024-09-17
Payer: MEDICARE

## 2024-09-17 LAB
ALBUMIN SERPL BCP-MCNC: 3.2 G/DL (ref 3.4–5)
ALP SERPL-CCNC: 94 U/L (ref 33–136)
ALT SERPL W P-5'-P-CCNC: 11 U/L (ref 7–45)
ANION GAP SERPL CALC-SCNC: 16 MMOL/L (ref 10–20)
AST SERPL W P-5'-P-CCNC: 15 U/L (ref 9–39)
BASOPHILS # BLD AUTO: 0.03 X10*3/UL (ref 0–0.1)
BASOPHILS NFR BLD AUTO: 0.5 %
BILIRUB SERPL-MCNC: 0.3 MG/DL (ref 0–1.2)
BUN SERPL-MCNC: 33 MG/DL (ref 6–23)
CALCIUM SERPL-MCNC: 8.4 MG/DL (ref 8.6–10.3)
CHLORIDE SERPL-SCNC: 107 MMOL/L (ref 98–107)
CO2 SERPL-SCNC: 21 MMOL/L (ref 21–32)
CREAT SERPL-MCNC: 1.93 MG/DL (ref 0.5–1.05)
CREAT UR-MCNC: 100.1 MG/DL (ref 20–320)
CRP SERPL-MCNC: 7.44 MG/DL
EGFRCR SERPLBLD CKD-EPI 2021: 25 ML/MIN/1.73M*2
EOSINOPHIL # BLD AUTO: 0.2 X10*3/UL (ref 0–0.4)
EOSINOPHIL NFR BLD AUTO: 3.1 %
ERYTHROCYTE [DISTWIDTH] IN BLOOD BY AUTOMATED COUNT: 17.6 % (ref 11.5–14.5)
ERYTHROCYTE [SEDIMENTATION RATE] IN BLOOD BY WESTERGREN METHOD: 36 MM/H (ref 0–30)
GLUCOSE SERPL-MCNC: 92 MG/DL (ref 74–99)
HCT VFR BLD AUTO: 21.9 % (ref 36–46)
HGB BLD-MCNC: 7.1 G/DL (ref 12–16)
HOLD SPECIMEN: NORMAL
HOLD SPECIMEN: NORMAL
IMM GRANULOCYTES # BLD AUTO: 0.16 X10*3/UL (ref 0–0.5)
IMM GRANULOCYTES NFR BLD AUTO: 2.5 % (ref 0–0.9)
IRON SATN MFR SERPL: 16 % (ref 25–45)
IRON SERPL-MCNC: 29 UG/DL (ref 35–150)
LYMPHOCYTES # BLD AUTO: 0.92 X10*3/UL (ref 0.8–3)
LYMPHOCYTES NFR BLD AUTO: 14.2 %
MAGNESIUM SERPL-MCNC: 1.81 MG/DL (ref 1.6–2.4)
MCH RBC QN AUTO: 32.3 PG (ref 26–34)
MCHC RBC AUTO-ENTMCNC: 32.4 G/DL (ref 32–36)
MCV RBC AUTO: 100 FL (ref 80–100)
MONOCYTES # BLD AUTO: 1.02 X10*3/UL (ref 0.05–0.8)
MONOCYTES NFR BLD AUTO: 15.7 %
NEUTROPHILS # BLD AUTO: 4.17 X10*3/UL (ref 1.6–5.5)
NEUTROPHILS NFR BLD AUTO: 64 %
NRBC BLD-RTO: 0 /100 WBCS (ref 0–0)
PLATELET # BLD AUTO: 189 X10*3/UL (ref 150–450)
POTASSIUM SERPL-SCNC: 4.6 MMOL/L (ref 3.5–5.3)
PROCALCITONIN SERPL-MCNC: 0.16 NG/ML
PROT SERPL-MCNC: 7.3 G/DL (ref 6.4–8.2)
PROT UR-ACNC: 78 MG/DL (ref 5–24)
PROT/CREAT UR: 0.78 MG/MG CREAT (ref 0–0.17)
RBC # BLD AUTO: 2.2 X10*6/UL (ref 4–5.2)
SODIUM SERPL-SCNC: 139 MMOL/L (ref 136–145)
TIBC SERPL-MCNC: 184 UG/DL (ref 240–445)
TSH SERPL-ACNC: 3.3 MIU/L (ref 0.44–3.98)
UIBC SERPL-MCNC: 155 UG/DL (ref 110–370)
WBC # BLD AUTO: 6.5 X10*3/UL (ref 4.4–11.3)

## 2024-09-17 PROCEDURE — 76770 US EXAM ABDO BACK WALL COMP: CPT | Performed by: RADIOLOGY

## 2024-09-17 PROCEDURE — 36415 COLL VENOUS BLD VENIPUNCTURE: CPT | Performed by: NURSE PRACTITIONER

## 2024-09-17 PROCEDURE — 83735 ASSAY OF MAGNESIUM: CPT | Performed by: NURSE PRACTITIONER

## 2024-09-17 PROCEDURE — 85652 RBC SED RATE AUTOMATED: CPT | Performed by: NURSE PRACTITIONER

## 2024-09-17 PROCEDURE — 97165 OT EVAL LOW COMPLEX 30 MIN: CPT | Mod: GO

## 2024-09-17 PROCEDURE — 80053 COMPREHEN METABOLIC PANEL: CPT | Performed by: NURSE PRACTITIONER

## 2024-09-17 PROCEDURE — 99221 1ST HOSP IP/OBS SF/LOW 40: CPT | Performed by: NURSE PRACTITIONER

## 2024-09-17 PROCEDURE — 99232 SBSQ HOSP IP/OBS MODERATE 35: CPT

## 2024-09-17 PROCEDURE — 83540 ASSAY OF IRON: CPT | Performed by: NURSE PRACTITIONER

## 2024-09-17 PROCEDURE — 84145 PROCALCITONIN (PCT): CPT | Mod: ELYLAB | Performed by: NURSE PRACTITIONER

## 2024-09-17 PROCEDURE — 83970 ASSAY OF PARATHORMONE: CPT | Mod: ELYLAB | Performed by: INTERNAL MEDICINE

## 2024-09-17 PROCEDURE — 82570 ASSAY OF URINE CREATININE: CPT | Performed by: INTERNAL MEDICINE

## 2024-09-17 PROCEDURE — 85025 COMPLETE CBC W/AUTO DIFF WBC: CPT | Performed by: NURSE PRACTITIONER

## 2024-09-17 PROCEDURE — 97161 PT EVAL LOW COMPLEX 20 MIN: CPT | Mod: GP

## 2024-09-17 PROCEDURE — 2500000001 HC RX 250 WO HCPCS SELF ADMINISTERED DRUGS (ALT 637 FOR MEDICARE OP): Performed by: NURSE PRACTITIONER

## 2024-09-17 PROCEDURE — 2500000004 HC RX 250 GENERAL PHARMACY W/ HCPCS (ALT 636 FOR OP/ED): Performed by: INTERNAL MEDICINE

## 2024-09-17 PROCEDURE — 1200000002 HC GENERAL ROOM WITH TELEMETRY DAILY

## 2024-09-17 PROCEDURE — 83521 IG LIGHT CHAINS FREE EACH: CPT | Mod: ELYLAB | Performed by: INTERNAL MEDICINE

## 2024-09-17 PROCEDURE — 84443 ASSAY THYROID STIM HORMONE: CPT

## 2024-09-17 PROCEDURE — 76770 US EXAM ABDO BACK WALL COMP: CPT

## 2024-09-17 PROCEDURE — 6350000001 HC RX 635 EPOETIN >10,000 UNITS: Mod: JZ | Performed by: INTERNAL MEDICINE

## 2024-09-17 PROCEDURE — 86140 C-REACTIVE PROTEIN: CPT | Performed by: NURSE PRACTITIONER

## 2024-09-17 PROCEDURE — 2500000004 HC RX 250 GENERAL PHARMACY W/ HCPCS (ALT 636 FOR OP/ED): Performed by: NURSE PRACTITIONER

## 2024-09-17 RX ORDER — ATORVASTATIN CALCIUM 20 MG/1
20 TABLET, FILM COATED ORAL NIGHTLY
Status: DISCONTINUED | OUTPATIENT
Start: 2024-09-17 | End: 2024-09-20 | Stop reason: HOSPADM

## 2024-09-17 RX ORDER — ONDANSETRON 4 MG/1
4 TABLET, FILM COATED ORAL EVERY 8 HOURS PRN
Status: DISCONTINUED | OUTPATIENT
Start: 2024-09-17 | End: 2024-09-20 | Stop reason: HOSPADM

## 2024-09-17 RX ORDER — ACETAMINOPHEN 325 MG/1
650 TABLET ORAL EVERY 4 HOURS PRN
Status: DISCONTINUED | OUTPATIENT
Start: 2024-09-17 | End: 2024-09-20 | Stop reason: HOSPADM

## 2024-09-17 RX ORDER — FLUTICASONE FUROATE AND VILANTEROL 200; 25 UG/1; UG/1
1 POWDER RESPIRATORY (INHALATION) DAILY
Status: DISCONTINUED | OUTPATIENT
Start: 2024-09-17 | End: 2024-09-20 | Stop reason: HOSPADM

## 2024-09-17 RX ORDER — HEPARIN SODIUM 5000 [USP'U]/ML
5000 INJECTION, SOLUTION INTRAVENOUS; SUBCUTANEOUS EVERY 8 HOURS SCHEDULED
Status: DISCONTINUED | OUTPATIENT
Start: 2024-09-17 | End: 2024-09-20 | Stop reason: HOSPADM

## 2024-09-17 RX ORDER — PANTOPRAZOLE SODIUM 40 MG/1
40 TABLET, DELAYED RELEASE ORAL DAILY
Status: DISCONTINUED | OUTPATIENT
Start: 2024-09-17 | End: 2024-09-20 | Stop reason: HOSPADM

## 2024-09-17 RX ORDER — MONTELUKAST SODIUM 10 MG/1
10 TABLET ORAL DAILY
Status: DISCONTINUED | OUTPATIENT
Start: 2024-09-17 | End: 2024-09-20 | Stop reason: HOSPADM

## 2024-09-17 RX ORDER — TALC
3 POWDER (GRAM) TOPICAL NIGHTLY PRN
Status: DISCONTINUED | OUTPATIENT
Start: 2024-09-17 | End: 2024-09-20 | Stop reason: HOSPADM

## 2024-09-17 RX ORDER — ASPIRIN 81 MG/1
81 TABLET ORAL DAILY
Status: DISCONTINUED | OUTPATIENT
Start: 2024-09-17 | End: 2024-09-20 | Stop reason: HOSPADM

## 2024-09-17 RX ORDER — ONDANSETRON HYDROCHLORIDE 2 MG/ML
4 INJECTION, SOLUTION INTRAVENOUS EVERY 8 HOURS PRN
Status: DISCONTINUED | OUTPATIENT
Start: 2024-09-17 | End: 2024-09-20 | Stop reason: HOSPADM

## 2024-09-17 RX ORDER — DONEPEZIL HYDROCHLORIDE 5 MG/1
5 TABLET, FILM COATED ORAL NIGHTLY
Status: DISCONTINUED | OUTPATIENT
Start: 2024-09-17 | End: 2024-09-20 | Stop reason: HOSPADM

## 2024-09-17 RX ORDER — ACETAMINOPHEN 160 MG/5ML
650 SOLUTION ORAL EVERY 4 HOURS PRN
Status: DISCONTINUED | OUTPATIENT
Start: 2024-09-17 | End: 2024-09-20 | Stop reason: HOSPADM

## 2024-09-17 RX ORDER — ACETAMINOPHEN 650 MG/1
650 SUPPOSITORY RECTAL EVERY 4 HOURS PRN
Status: DISCONTINUED | OUTPATIENT
Start: 2024-09-17 | End: 2024-09-20 | Stop reason: HOSPADM

## 2024-09-17 RX ORDER — PANTOPRAZOLE SODIUM 40 MG/10ML
40 INJECTION, POWDER, LYOPHILIZED, FOR SOLUTION INTRAVENOUS DAILY
Status: DISCONTINUED | OUTPATIENT
Start: 2024-09-17 | End: 2024-09-20 | Stop reason: HOSPADM

## 2024-09-17 SDOH — SOCIAL STABILITY: SOCIAL INSECURITY: HAVE YOU HAD ANY THOUGHTS OF HARMING ANYONE ELSE?: NO

## 2024-09-17 SDOH — SOCIAL STABILITY: SOCIAL INSECURITY: DO YOU FEEL UNSAFE GOING BACK TO THE PLACE WHERE YOU ARE LIVING?: NO

## 2024-09-17 SDOH — SOCIAL STABILITY: SOCIAL INSECURITY: DO YOU FEEL ANYONE HAS EXPLOITED OR TAKEN ADVANTAGE OF YOU FINANCIALLY OR OF YOUR PERSONAL PROPERTY?: NO

## 2024-09-17 SDOH — SOCIAL STABILITY: SOCIAL INSECURITY: ABUSE: ADULT

## 2024-09-17 SDOH — SOCIAL STABILITY: SOCIAL INSECURITY: ARE THERE ANY APPARENT SIGNS OF INJURIES/BEHAVIORS THAT COULD BE RELATED TO ABUSE/NEGLECT?: NO

## 2024-09-17 SDOH — SOCIAL STABILITY: SOCIAL INSECURITY: HAS ANYONE EVER THREATENED TO HURT YOUR FAMILY OR YOUR PETS?: NO

## 2024-09-17 SDOH — SOCIAL STABILITY: SOCIAL INSECURITY: WERE YOU ABLE TO COMPLETE ALL THE BEHAVIORAL HEALTH SCREENINGS?: YES

## 2024-09-17 SDOH — SOCIAL STABILITY: SOCIAL INSECURITY: HAVE YOU HAD THOUGHTS OF HARMING ANYONE ELSE?: NO

## 2024-09-17 SDOH — SOCIAL STABILITY: SOCIAL INSECURITY: DOES ANYONE TRY TO KEEP YOU FROM HAVING/CONTACTING OTHER FRIENDS OR DOING THINGS OUTSIDE YOUR HOME?: NO

## 2024-09-17 SDOH — SOCIAL STABILITY: SOCIAL INSECURITY: ARE YOU OR HAVE YOU BEEN THREATENED OR ABUSED PHYSICALLY, EMOTIONALLY, OR SEXUALLY BY ANYONE?: NO

## 2024-09-17 ASSESSMENT — ENCOUNTER SYMPTOMS
DIFFICULTY URINATING: 0
ARTHRALGIAS: 0
SHORTNESS OF BREATH: 1
EYE DISCHARGE: 0
AGITATION: 0
ADENOPATHY: 0
UNEXPECTED WEIGHT CHANGE: 1
ACTIVITY CHANGE: 1
DIZZINESS: 0
ABDOMINAL DISTENTION: 0

## 2024-09-17 ASSESSMENT — LIFESTYLE VARIABLES
HOW OFTEN DO YOU HAVE A DRINK CONTAINING ALCOHOL: NEVER
HOW MANY STANDARD DRINKS CONTAINING ALCOHOL DO YOU HAVE ON A TYPICAL DAY: PATIENT DOES NOT DRINK
AUDIT-C TOTAL SCORE: 0
HOW OFTEN DO YOU HAVE 6 OR MORE DRINKS ON ONE OCCASION: NEVER
AUDIT-C TOTAL SCORE: 0
SKIP TO QUESTIONS 9-10: 1

## 2024-09-17 ASSESSMENT — PAIN - FUNCTIONAL ASSESSMENT
PAIN_FUNCTIONAL_ASSESSMENT: 0-10

## 2024-09-17 ASSESSMENT — PAIN SCALES - GENERAL
PAINLEVEL_OUTOF10: 0 - NO PAIN

## 2024-09-17 ASSESSMENT — COGNITIVE AND FUNCTIONAL STATUS - GENERAL
PATIENT BASELINE BEDBOUND: NO
WALKING IN HOSPITAL ROOM: A LITTLE
DAILY ACTIVITIY SCORE: 23
HELP NEEDED FOR BATHING: A LITTLE
MOBILITY SCORE: 24
CLIMB 3 TO 5 STEPS WITH RAILING: A LITTLE
MOBILITY SCORE: 22
DAILY ACTIVITIY SCORE: 24

## 2024-09-17 ASSESSMENT — ACTIVITIES OF DAILY LIVING (ADL)
LACK_OF_TRANSPORTATION: NO
JUDGMENT_ADEQUATE_SAFELY_COMPLETE_DAILY_ACTIVITIES: YES
TOILETING: INDEPENDENT
GROOMING: INDEPENDENT
ADEQUATE_TO_COMPLETE_ADL: YES
PATIENT'S MEMORY ADEQUATE TO SAFELY COMPLETE DAILY ACTIVITIES?: NO
WALKS IN HOME: INDEPENDENT
JUDGMENT_ADEQUATE_SAFELY_COMPLETE_DAILY_ACTIVITIES: NO
HEARING - LEFT EAR: FUNCTIONAL
HEARING - RIGHT EAR: FUNCTIONAL
DRESSING YOURSELF: INDEPENDENT
FEEDING YOURSELF: INDEPENDENT
BATHING: INDEPENDENT

## 2024-09-17 ASSESSMENT — PATIENT HEALTH QUESTIONNAIRE - PHQ9
2. FEELING DOWN, DEPRESSED OR HOPELESS: NOT AT ALL
1. LITTLE INTEREST OR PLEASURE IN DOING THINGS: NOT AT ALL
SUM OF ALL RESPONSES TO PHQ9 QUESTIONS 1 & 2: 0

## 2024-09-17 NOTE — CONSULTS
"Nutrition Initial Assessment:   Nutrition Assessment    Reason for Assessment: Admission nursing screening    Patient is a 89 y.o. female presenting with pneumonia due to infectious organism. Spouse at bedside during assessment.       Nutrition History:  Energy Intake: Good > 75 %  Food and Nutrient History: Pt reports good appetite but has been losing wt. Wt loss started in 2021. Started to gain weight back when she was drinking 2 ensure per day but is now only drinking 1 ensure per week. Pt and spouse feel wt loss started to occur again when decrease in ensure intake happened. Denies N/V/D/C and issues chewing/swallowing.  Vitamin/Herbal Supplement Use: none listed in home med list  Food Allergies/Intolerances:  None  GI Symptoms: None  Oral Problems: None       Anthropometrics:  Height: 170.2 cm (5' 7\")   Weight: 51.3 kg (113 lb)   BMI (Calculated): 17.69  IBW/kg (Dietitian Calculated): 61.4 kg          Weight History:   Wt Readings from Last 10 Encounters:   09/16/24 51.3 kg (113 lb)   07/01/24 49.9 kg (110 lb)   03/29/24 51.3 kg (113 lb 3.2 oz)   02/21/24 50.6 kg (111 lb 8 oz)   12/29/23 50.2 kg (110 lb 9.6 oz)   09/29/23 51.9 kg (114 lb 6.4 oz)   06/29/23 51.7 kg (114 lb)   04/05/23 53.1 kg (117 lb)   03/30/23 52.6 kg (116 lb)   02/08/23 52.8 kg (116 lb 8 oz)      Weight Change %:  Weight History / % Weight Change: No significant wt loss based on available wt records. Pt actually appears to have had small wt gain x 9 month, although wt this admission is documented as stated and may not be accurate.  Significant Weight Loss: No    Nutrition Focused Physical Exam Findings:    Subcutaneous Fat Loss:   Orbital Fat Pads: Severe (dark circles, hollowing and loose skin)  Buccal Fat Pads: Severe (hollow, sunken and narrow face)  Triceps: Mild-Moderate (less than ample fat tissue)  Muscle Wasting:  Temporalis: Severe (hollowed scooping depression)  Pectoralis (Clavicular Region): Severe (protruding prominent " clavicle)  Deltoid/Trapezius: Mild-Moderate (slight protrusion of acromion process)  Interosseous: Mild-Moderate (slightly depressed area between thumb and forefinger)  Trapezius/Infraspinatus/Supraspinatus (Scapular Region): Mild-Moderate (slight protrusion of scapula)  Edema:  Edema: none  Physical Findings:  Mouth: Negative  Nails: Negative  Skin: Negative    Nutrition Significant Labs:    Reviewed   Nutrition Specific Medications:  Reviewed     I/O:    ;      Dietary Orders (From admission, onward)       Start     Ordered    09/17/24 1118  Oral nutritional supplements  Until discontinued        Comments: strawberry   Question Answer Comment   Deliver with Breakfast    Deliver with Dinner    Select supplement: Ensure Plus High Protein        09/17/24 1117    09/17/24 0010  Adult diet Regular  Diet effective now        Question:  Diet type  Answer:  Regular    09/17/24 0009                     Estimated Needs:   Total Energy Estimated Needs (kCal): 1539 kCal  Method for Estimating Needs: 30 kcal/kg ABW  Total Protein Estimated Needs (g): 41 g  Method for Estimating Needs: 0.8 g/kg ABW or as renal function permits  Total Fluid Estimated Needs (mL): 1539 mL  Method for Estimating Needs: 1 ml/kcal or per MD        Nutrition Diagnosis   Malnutrition Diagnosis  Patient has Malnutrition Diagnosis: Yes  Diagnosis Status: New  Malnutrition Diagnosis: Severe malnutrition related to chronic disease or condition  As Evidenced by: moderate-severe fat wasting; moderate-severe muscle wasting            Nutrition Interventions/Recommendations         Nutrition Prescription:  Individualized Nutrition Prescription Provided for : Regular diet with ONS        Nutrition Interventions:   Interventions: Meals and snacks, Medical food supplement  Meals and Snacks: General healthful diet  Goal: Consumes 3 meals per day  Medical Food Supplement: Commercial beverage  Goal: Ensure Plus High Protein BID (provides 350 kcal, 20 g protein per  serving) strawberry         Nutrition Education:   Discussed increasing use of ONS again at home.        Nutrition Monitoring and Evaluation   Food/Nutrient Related History Monitoring  Monitoring and Evaluation Plan: Energy intake, Amount of food, Fluid intake  Energy Intake: Estimated energy intake  Criteria: Pt meets >75% of estimated energy needs  Fluid Intake: Estimated fluid intake  Criteria: Meets >75% of estimated fluid needs  Amount of Food: Estimated amout of food, Medical food intake  Criteria: Pt consumes >75% of meals and supplements    Body Composition/Growth/Weight History  Monitoring and Evaluation Plan: Weight  Weight: Measured weight  Criteria: Maintains stable weight                   Time Spent (min): 45 minutes

## 2024-09-17 NOTE — PROGRESS NOTES
09/17/24 1053   Discharge Planning   Living Arrangements Spouse/significant other   Support Systems Spouse/significant other   Assistance Needed PTA - reported as none   Type of Residence Private residence   Home or Post Acute Services None   Expected Discharge Disposition Home   Does the patient need discharge transport arranged? No     Admitted for CKD, GI bleed, and pneumonia. Started on IV Azithromycin and Rocephin for Pneumonia and UTI. Hgb 5.9 in ER, got 2 units of PRBC. Nephro and GI consulted. PCP Dr Jones, last seen in July 2024. History of dementia. Lives with spouse and is independent at baseline.

## 2024-09-17 NOTE — PROGRESS NOTES
"Daily Progress Note    Bora Moreno is a 89 y.o. female on day 1 of admission presenting with Pneumonia due to infectious organism, unspecified laterality, unspecified part of lung.    Subjective   Patient seen resting comfortably in bed with  at bedside.  States she is feeling well overall today.  Denies chest pain, shortness of breath, N/V/D, abdominal pain, headache, dizziness.  States that she has noticed a mild nonproductive cough.       Objective   Physical Exam  Constitutional:       Appearance: Normal appearance.   HENT:      Head: Normocephalic.      Mouth/Throat:      Mouth: Mucous membranes are moist.   Eyes:      Pupils: Pupils are equal, round, and reactive to light.   Cardiovascular:      Rate and Rhythm: Normal rate and regular rhythm.      Heart sounds: Normal heart sounds, S1 normal and S2 normal.   Pulmonary:      Effort: Pulmonary effort is normal.      Breath sounds: Normal breath sounds.   Abdominal:      General: Bowel sounds are normal.      Palpations: Abdomen is soft.   Musculoskeletal:         General: Normal range of motion.      Cervical back: Neck supple.   Skin:     General: Skin is warm.   Neurological:      Mental Status: She is alert and oriented to person, place, and time.   Psychiatric:         Mood and Affect: Mood normal.         Behavior: Behavior normal.         Last Recorded Vitals  Blood pressure 155/70, pulse 83, temperature 36.4 °C (97.5 °F), temperature source Temporal, resp. rate 21, height 1.702 m (5' 7\"), weight 51.3 kg (113 lb), SpO2 100%.  Intake/Output last 3 Shifts:  I/O last 3 completed shifts:  In: 1000 (19.5 mL/kg) [Blood:700; IV Piggyback:300]  Out: - (0 mL/kg)   Weight: 51.3 kg     Medications  Scheduled medications  aspirin, 81 mg, oral, Daily  atorvastatin, 20 mg, oral, Nightly  cefTRIAXone, 1 g, intravenous, q24h  donepezil, 5 mg, oral, Nightly  epoetin faye or biosimilar, 10,000 Units, subcutaneous, Once  ferric carboxymaltose, 750 mg, " intravenous, Once  fluticasone furoate-vilanteroL, 1 puff, inhalation, Daily  heparin (porcine), 5,000 Units, subcutaneous, q8h LEONARD  montelukast, 10 mg, oral, Daily  pantoprazole, 40 mg, oral, Daily   Or  pantoprazole, 40 mg, intravenous, Daily      Continuous medications     PRN medications  PRN medications: acetaminophen **OR** acetaminophen **OR** acetaminophen, melatonin, ondansetron **OR** ondansetron    Labs  CBC:   Results from last 7 days   Lab Units 09/17/24  0519 09/16/24  1736   WBC AUTO x10*3/uL 6.5 7.3   RBC AUTO x10*6/uL 2.20* 1.80*   HEMOGLOBIN g/dL 7.1* 5.9*   HEMATOCRIT % 21.9* 19.2*   MCV fL 100 107*   MCH pg 32.3 32.8   MCHC g/dL 32.4 30.7*   RDW % 17.6* 15.4*   PLATELETS AUTO x10*3/uL 189 202     CMP:    Results from last 7 days   Lab Units 09/17/24  0519 09/16/24  1736   SODIUM mmol/L 139 139   POTASSIUM mmol/L 4.6 4.7   CHLORIDE mmol/L 107 107   CO2 mmol/L 21 20*   BUN mg/dL 33* 37*   CREATININE mg/dL 1.93* 2.25*   GLUCOSE mg/dL 92 109*   PROTEIN TOTAL g/dL 7.3 7.9   CALCIUM mg/dL 8.4* 8.7   BILIRUBIN TOTAL mg/dL 0.3 0.3   ALK PHOS U/L 94 100   AST U/L 15 15   ALT U/L 11 12     BMP:    Results from last 7 days   Lab Units 09/17/24  0519 09/16/24  1736   SODIUM mmol/L 139 139   POTASSIUM mmol/L 4.6 4.7   CHLORIDE mmol/L 107 107   CO2 mmol/L 21 20*   BUN mg/dL 33* 37*   CREATININE mg/dL 1.93* 2.25*   CALCIUM mg/dL 8.4* 8.7   GLUCOSE mg/dL 92 109*     Magnesium:  Results from last 7 days   Lab Units 09/17/24  0519 09/16/24  1736   MAGNESIUM mg/dL 1.81 1.92     Troponin:    Results from last 7 days   Lab Units 09/16/24  1840 09/16/24  1736   TROPHS ng/L 22* 21*     BNP:   Results from last 7 days   Lab Units 09/16/24  1736   BNP pg/mL 182*     Lipid Panel:         Nutrition    Malnutrition Diagnosis Status: New  Malnutrition Diagnosis: Severe malnutrition related to chronic disease or condition  As Evidenced by: moderate-severe fat wasting; moderate-severe muscle wasting  I agree with the  "dietitian's malnutrition diagnosis.      Relevant Results  Results from last 7 days   Lab Units 09/17/24  0519 09/16/24  1736   GLUCOSE mg/dL 92 109*     No results found for: \"HGBA1C\"     Assessment/Plan    Acute community-acquired pneumonia  -Finish IV azithromycin  -Will defer antibiotics after completion of azithromycin due to lack of symptoms  -Pro-Magdaleno 0.16  -Elevated ESR and CRP  -Follow blood cultures  -Obtain sputum culture    Symptomatic anemia  -Consider iron deficiency anemia versus CKD versus acute GI bleed  -Hemoglobin was 5.9 on admission, 2 units PRBCs transfused  -Iron studies show low TIBC, low iron, low percent saturation  -GI consult, trend hemoglobin, monitor for overt bleeding, transfuse as necessary, no acute intervention  -Per GI will need to follow-up within 1 week of discharge and have CBC and iron panel completed  -Nephrology consulted, obtain renal ultrasound  -Trend H&H, transfuse for hemoglobin less than 7    Acute UTI, uncomplicated  -Continue IV Rocephin  -UA with over 50 WBCs, 4+ bacteria, 500 leukocyte esterase  -Urine culture pending    Hypothyroidism  -Continue home Synthroid  -TSH 3.30      DVTp: None for now  PLAN: Discharge home tomorrow    Jerica Pino PA-C    Plan of care was discussed extensively with patient.  Patient verbalized understanding through teach back method.  All question and concerns addressed upon examination.    Of note, this documentation is completed using the Dragon Dictation system (voice recognition software). There may be spelling and/or grammatical errors that were not corrected prior to final submission.  "

## 2024-09-17 NOTE — PROGRESS NOTES
Occupational Therapy    Evaluation    Patient Name: Bora Moreno  MRN: [id number]  Department: [facility]  Room: [facility]1009-A  Today's Date: 9/17/2024  Time Calculation  Start Time: 1016  Stop Time: 1024  Time Calculation (min): 8 min        Assessment:  OT Assessment: Independent; supervision level for ADLs/transfers/mobillity. Pt. is at baseline mobility/ADLs.  states comfortable with discharge home.  Prognosis: Good  Evaluation/Treatment Tolerance: Patient tolerated treatment well  End of Session Patient Position: Alarm on (Seated EOB with  present. Pt. confused at end of sesion thinking that she was able to leave the hospital.)  Prognosis: Good  Evaluation/Treatment Tolerance: Patient tolerated treatment well  Plan:  No Skilled OT: No acute OT goals identified  OT Frequency: OT eval only  OT Discharge Recommendations: No further acute OT, 24 hr supervision due to cognition  OT - OK to Discharge: Yes (when medicallly stable/cleared.)     Subjective   Current Problem:  1. Pneumonia due to infectious organism, unspecified laterality, unspecified part of lung        2. Urinary tract infection with hematuria, site unspecified        3. Anemia, unspecified type        4. Dyspnea, unspecified type          General:  General  Reason for Referral: ADL impairment  Referred By: Judy 9/17 OT/PT  Past Medical History Relevant to Rehab: TAVR, Asthma, HTN, HLD, CAD, Dementia, A-fib, CKD, anemia  Family/Caregiver Present: Yes  Caregiver Feedback:  present  Co-Treatment: PT  Co-Treatment Reason: to maximize pt. safety  Prior to Session Communication: Bedside nurse  Patient Position Received: Alarm on (seated EOB)  General Comment: Pt. is 89 y/o female to ED with complaint of shortness of breath and dry cough x 2 to 3 days. hgb 5.9 up to 7.1. CXR: new area of airspace disease, R lung base possible PNA  Precautions:  Precautions Comment: hx of dementia    Pain:  Pain Assessment  Pain Assessment: 0-10  0-10  (Numeric) Pain Score: 0 - No pain    Objective   Cognition:  Overall Cognitive Status:  (WFL; baseline dementia. Ax2-3. cues for month)    Home Living:  Home Living Comments: pt. lives with spouse in one story house, no steps. Has tub shower, no DME.  Prior Function:  Prior Function Comments: Independent with ADLs, shares IADLs with   - pt. does the laundry. No AD use for ambulation. no falls.Does not drive -  drives.    ADL:  Eating Assistance: Independent  Grooming Assistance: Independent  Bathing Assistance:  (Supervision)  UE Dressing Assistance: Independent  LE Dressing Assistance: Independent  Toileting Assistance with Device: Independent  Activity Tolerance:  Endurance: Endurance does not limit participation in activity  Activity Tolerance Comments: WNL  Bed Mobility/Transfers: Bed Mobility  Bed Mobility: No    Transfers  Transfer: Yes  Transfer 1  Technique 1: Sit to stand  Transfer Level of Assistance 1: Independent  Trials/Comments 1: No AD use.    Functional Mobility:  Functional Mobility  Functional Mobility Performed:  (>50 feet. No AD. supervision for safety)  Sitting Balance:  Static Sitting Balance  Static Sitting-Level of Assistance: Independent  Standing Balance:  Dynamic Standing Balance  Dynamic Standing-Comments: Fair +, no AD     Sensation:  Sensation Comment: Pt.  reports that pt. gets muscle spasms in BLEs. Pt. denies tingling, numbness  Strength:  Strength Comments: BUEs 3+/5 MMT    Coordination:  Coordination Comment: WFL   Hand Function:  Gross Grasp: Functional  Extremities: RUE   RUE : Within Functional Limits and LUE   LUE:  (Limited shoulder flexion ~90 deg. WNL elbow/wrist/hand)    Outcome Measures:[facility] Daily Activity  Putting on and taking off regular lower body clothing: None  Bathing (including washing, rinsing, drying): A little  Putting on and taking off regular upper body clothing: None  Toileting, which includes using toilet, bedpan or urinal: None  Taking  care of personal grooming such as brushing teeth: None  Eating Meals: None  Daily Activity - Total Score: 23      Education Documentation  ADL Training, taught by Iesha Sampson OT at 9/17/2024  1:03 PM.  Learner: Significant Other, Patient  Readiness: Acceptance  Method: Explanation  Response: Verbalizes Understanding    IP EDUCATION:  Education  Individual(s) Educated: Patient, Spouse  Education Provided: POC discussed and agreed upon

## 2024-09-17 NOTE — CONSULTS
[facility] Nephrology  Consult Note           Reason for Consult:  ckd stage 4, anemia  Requesting Physician:  Dr. Tran    Chief Complaint:  sob, anemia  History Obtained From:  patient, electronic medical record    History of Present Ilness:    89 y.o. year old female admitted with sob and anemia.  She has underlying ckd stage 4.  Has never had to see nephrology before.  Urine shows 1+ proteinuria.  Risk factor of HTN, Hyperlipidemia and TAVR.  Denies any NSAID use.   Ultrasound in 2021 was negative with 10 cm kidneys.  She has anemia with baseline hgb in 8s.  Was 5.9.  got blood. Refused GI eval.  Has lost a lot of weight.  Urinates ok.  Urine with UTI.     Past Medical History:    Past Medical History:   Diagnosis Date    COPD (chronic obstructive pulmonary disease) (Multi)     High blood cholesterol     Hypertension     Personal history of other diseases of the circulatory system 07/29/2022    History of aortic valve stenosis        Past Surgical History:    Past Surgical History:   Procedure Laterality Date    CARDIAC CATHETERIZATION Left     CATARACT EXTRACTION Right     OTHER SURGICAL HISTORY  04/29/2019    Hysterectomy    OTHER SURGICAL HISTORY  09/25/2021    Colonoscopy    OTHER SURGICAL HISTORY  09/25/2021    Dilation and curettage    OTHER SURGICAL HISTORY  09/25/2021    Esophagogastroduodenoscopy    OTHER SURGICAL HISTORY  10/20/2021    Transcatheter aortic valve replacement        Home Medications:    No current facility-administered medications on file prior to encounter.     Current Outpatient Medications on File Prior to Encounter   Medication Sig Dispense Refill    aspirin 81 mg EC tablet Take 1 tablet (81 mg) by mouth once daily.      atorvastatin (Lipitor) 20 mg tablet Take 1 tablet (20 mg) by mouth once daily at bedtime. 90 tablet 3    donepezil (Aricept) 5 mg tablet TAKE ONE TABLET BY MOUTH ONCE DAILY AT BEDTIME 90 tablet 1    fluticasone furoate-vilanteroL (Breo Ellipta) 200-25 mcg/dose  inhaler USE 1 INHALATION DAILY 2 each 3    furosemide (Lasix) 20 mg tablet Take 1 tablet (20 mg) by mouth if needed (Take 1 as needed for swelling in ankles).      montelukast (Singulair) 10 mg tablet TAKE 1 TABLET DAILY 90 tablet 3    valsartan-hydrochlorothiazide (Diovan-HCT) 80-12.5 mg tablet TAKE ONE-HALF (1/2) TABLET DAILY 45 tablet 3       Allergies:  Captopril and Influenza virus vaccines    Social History:    Social History     Socioeconomic History    Marital status:      Spouse name: Not on file    Number of children: Not on file    Years of education: Not on file    Highest education level: Not on file   Occupational History    Not on file   Tobacco Use    Smoking status: Former     Current packs/day: 0.00     Types: Cigarettes     Start date:      Quit date:      Years since quittin.7     Passive exposure: Never    Smokeless tobacco: Never   Vaping Use    Vaping status: Never Used   Substance and Sexual Activity    Alcohol use: Never    Drug use: Never    Sexual activity: Not on file   Other Topics Concern    Not on file   Social History Narrative    Not on file     Social Determinants of Health     Financial Resource Strain: Low Risk  (2024)    Overall Financial Resource Strain (CARDIA)     Difficulty of Paying Living Expenses: Not hard at all   Food Insecurity: Not on file   Transportation Needs: No Transportation Needs (2024)    PRAPARE - Transportation     Lack of Transportation (Medical): No     Lack of Transportation (Non-Medical): No   Physical Activity: Not on file   Stress: Not on file   Social Connections: Not on file   Intimate Partner Violence: Not on file   Housing Stability: Low Risk  (2024)    Housing Stability Vital Sign     Unable to Pay for Housing in the Last Year: No     Number of Times Moved in the Last Year: 0     Homeless in the Last Year: No       Family History:   Family History   Problem Relation Name Age of Onset    Stroke Father      Other  "(cerebrovascular accident) Father          cerebrovascular accident (CVA)    Coronary artery disease Father         Review of Systems:   Review of Systems   Constitutional:  Positive for activity change and unexpected weight change.   HENT:  Negative for congestion.    Eyes:  Negative for discharge.   Respiratory:  Positive for shortness of breath.    Cardiovascular:  Negative for leg swelling.   Gastrointestinal:  Negative for abdominal distention.   Endocrine: Negative for cold intolerance.   Genitourinary:  Negative for difficulty urinating.   Musculoskeletal:  Negative for arthralgias.   Allergic/Immunologic: Negative for environmental allergies.   Neurological:  Negative for dizziness.   Hematological:  Negative for adenopathy.   Psychiatric/Behavioral:  Negative for agitation.          Physical exam:   Constitutional:  VITALS:  /70 (BP Location: Left arm, Patient Position: Lying)   Pulse 83   Temp 36.4 °C (97.5 °F) (Temporal)   Resp 21   Ht 1.702 m (5' 7\")   Wt 51.3 kg (113 lb)   SpO2 100%   BMI 17.70 kg/m²      Wt Readings from Last 3 Encounters:   09/16/24 51.3 kg (113 lb)   07/01/24 49.9 kg (110 lb)   03/29/24 51.3 kg (113 lb 3.2 oz)      Gen: alert, awake, nad  Head: atraumatic, normocephalic  Skin: no rash, turgor wnl  Heent:  eomi, mmm  Neck: no bruits or jvd noted, thyroid normal  Lungs:  clear to auscultation  Heart:  regular rate and rhythm, no murmurs  Abdomen:  +bs, soft, nt, nd, no hepatosplenomegaly   Extremities: no edema  Psychiatric: mood and affect appropriate; judgement and insight intact  Musculoskeletal:  Rom, muscular strength intact; digits, nails normal    Data/  CBC:   Lab Results   Component Value Date    WBC 6.5 09/17/2024    RBC 2.20 (L) 09/17/2024    HGB 7.1 (L) 09/17/2024    HCT 21.9 (L) 09/17/2024     09/17/2024    MCH 32.3 09/17/2024    MCHC 32.4 09/17/2024    RDW 17.6 (H) 09/17/2024     09/17/2024     BMP:    Lab Results   Component Value Date    NA " 139 09/17/2024    K 4.6 09/17/2024     09/17/2024    CO2 21 09/17/2024    BUN 33 (H) 09/17/2024    CREATININE 1.93 (H) 09/17/2024    CALCIUM 8.4 (L) 09/17/2024    GLUCOSE 92 09/17/2024     ECG 12 lead  Sinus tachycardia  Possible Left atrial enlargement  Nonspecific ST abnormality  Abnormal ECG  When compared with ECG of 16-SEP-2024 17:50, (unconfirmed)  No significant change was found  XR chest 1 view  Narrative: Interpreted By:  Rudi Nguyễn,   STUDY:  XR CHEST 1 VIEW      INDICATION:  Signs/Symptoms:shortness of breath.      COMPARISON:  July 27, 2021      ACCESSION NUMBER(S):  [id number]      ORDERING CLINICIAN:  KRISTEN THAPA      FINDINGS:  Hyperinflation with COPD.      New area of airspace disease in the medial right lung base could be  some linear atelectasis or possibly a component of middle lobe  collapse or pneumonia.      Previous valve replacement again noted unchanged      Impression: New area of airspace disease in the medial right lung base could be  some linear atelectasis or possibly a component of middle lobe  collapse or pneumonia.      Signed by: Rudi Nguyễn 9/16/2024 6:21 PM  Dictation workstation:   [id number]  ECG 12 lead  Normal sinus rhythm  Normal ECG  When compared with ECG of 27-JUL-2021 03:47,  No significant change was found    LFT:   Lab Results   Component Value Date    AST 15 09/17/2024    ALT 11 09/17/2024    ALKPHOS 94 09/17/2024    BILITOT 0.3 09/17/2024      Urinalysis:   Lab Results   Component Value Date    NENA 5.0 09/16/2024    PROTUR 30 (1+) (A) 09/16/2024    GLUCOSEU Normal 09/16/2024    BLOODU 0.06 (1+) (A) 09/16/2024    KETONESU NEGATIVE 09/16/2024    BILIRUBINU NEGATIVE 09/16/2024    NITRITEU NEGATIVE 09/16/2024    LEUKOCYTESU 500 Philippe/µL (A) 09/16/2024      Imaging: ECG 12 lead  Sinus tachycardia  Possible Left atrial enlargement  Nonspecific ST abnormality  Abnormal ECG  When compared with ECG of 16-SEP-2024 17:50, (unconfirmed)  No significant change was  found  XR chest 1 view  Narrative: Interpreted By:  Rdui Nguyễn,   STUDY:  XR CHEST 1 VIEW      INDICATION:  Signs/Symptoms:shortness of breath.      COMPARISON:  July 27, 2021      ACCESSION NUMBER(S):  [id number]      ORDERING CLINICIAN:  KIRSTEN THAPA      FINDINGS:  Hyperinflation with COPD.      New area of airspace disease in the medial right lung base could be  some linear atelectasis or possibly a component of middle lobe  collapse or pneumonia.      Previous valve replacement again noted unchanged      Impression: New area of airspace disease in the medial right lung base could be  some linear atelectasis or possibly a component of middle lobe  collapse or pneumonia.      Signed by: Rudi Nguyễn 9/16/2024 6:21 PM  Dictation workstation:   [id number]  ECG 12 lead  Normal sinus rhythm  Normal ECG  When compared with ECG of 27-JUL-2021 03:47,  No significant change was found           Assessment/  89 y.o. yo female admitted with sob and anemia.  Has underlying ckd stage 4.  Has been abnormal for some time at least since 2019.  Risk factors of HTN, Hyperlipidemia and TAVR.  Has 1+ proteinuria.  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  Normal EF.  Slight hypoalbuminemia.  Anemic with low iron levels      Plan/  Pt doesn't want extensive work up and refused GI eval per their note  If patient is being discharged could follow up as outpt  Repeat ultrasound  IV iron and retacrit for anemia  Check spep/free light chains  Quantify proteinuria  No change to her outpt meds right now from my standpoint.  Would consider adding bp meds if stil high  Outpatient follow up from renal standpoint: Dr. Major    Thank you for the consultation.  Please do not hesitate to call with questions.    Cameron Major MD

## 2024-09-17 NOTE — CONSULTS
Reason For Consult  Blood in stool      This note was created using voice recognition transcription software. Despite proofreading, unintentional typographical errors may be present. Please contact the GI office with any questions or concerns.       This is an 89 yo Male with a PMH of CKD, HTN, HLD, TAVR, cachexia, former tobacco use, and Afib (not on AC) who presented to [facility] on 24 with reports of SOB and dry cough x 3 days.  GI was consulted for blood in stool.  Patient likely has CAP on Azithromycin/Rocephin, and a UTI.   is at the bedside.        Subjective  Patient states she feels okay.  States she doesn't have bloody BM's but has streaks on the toilet paper about once a month due to her hemorrhoids.  Denies pushing/straining/sitting for long periods of time.  Denies any issues with constipation and has daily regular BM's.   states she has a poor appetite and has been unable to maintain her weight.  She was approximately 140 lbs in  and since then has lost a lot of weight and ranges from 105-110 per her .      Had a regular brown BM today.  Denies abdominal pain/rectal pain, n/v or any other GI symptoms.     states she was anemic in  and was on iron pills for some time.  Prior history of heavy menses.      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  Colonoscopy-3/19/21 for DIEUDONNE showing a 2 mm polyp in cecum, 1 6-7 mm polyp in ascending, an 8-9 mm polyp in transverse, diverticula in sigmoid, IH  BM-Daily.  Normal consistency.  No bleeding or weight loss.  FHX-2 brothers  of colon CA (age 60 and 80)  SHX-No tobacco/illicits/ETOH.  Former tobacco use.  Ab Sx-Hysterectomy  NSAIDs-Denies         Allergies: as mentioned in H&P      A 10 point review of system is negative except for what is mentioned in the HPI    Vital Signs: Reviewed    Physical Exam:  General: Polite, calm, resting, thin  Skin:  Warm and dry, no jaundice  HEENT: No scleral icterus,  no conjunctival pallor, normocephalic, atraumatic, mucous membranes moist  Neck:  atraumatic, trachea midline, no JVD  Chest:  Clear to auscultation bilaterally. No wheezes, rales, or rhonchi  CV:  Regular rate and rhythm.  Positive S1/S2  Abdomen: no distension, +BS, soft, non-tender to palpation, no rebound tenderness, no guarding, no rigidity, no discernible ascites   Extremities: no lower extremity edema, chronic pigmentary changes, no cyanosis  Neurological:  A&Ox3  Psychiatric: cooperative     Investigations:  Labs, radiological imaging and cardiac work up were reviewed      Objective:         9/16/2024    10:34 PM 9/16/2024    10:40 PM 9/16/2024    10:44 PM 9/16/2024    11:39 PM 9/17/2024     1:08 AM 9/17/2024     4:31 AM 9/17/2024     5:00 AM   Vitals   Systolic 186 153 161 154 173 159 164   Diastolic 83 69 68 68 76 69 72   Heart Rate 98 100 108 109 100 109 96   Temp 36.7 °C (98.1 °F) 36.7 °C (98.1 °F) 36.7 °C (98.1 °F) 36.6 °C (97.9 °F)   37.3 °C (99.1 °F)   Resp 26 24 26 22 20 20 16          Medications:  aspirin, 81 mg, oral, Daily  atorvastatin, 20 mg, oral, Nightly  cefTRIAXone, 1 g, intravenous, q24h  donepezil, 5 mg, oral, Nightly  fluticasone furoate-vilanteroL, 1 puff, inhalation, Daily  heparin (porcine), 5,000 Units, subcutaneous, q8h LEONARD  montelukast, 10 mg, oral, Daily  pantoprazole, 40 mg, oral, Daily   Or  pantoprazole, 40 mg, intravenous, Daily         Recent Results (from the past 72 hour(s))   CBC and Auto Differential    Collection Time: 09/16/24  5:36 PM   Result Value Ref Range    WBC 7.3 4.4 - 11.3 x10*3/uL    nRBC 0.0 0.0 - 0.0 /100 WBCs    RBC 1.80 (L) 4.00 - 5.20 x10*6/uL    Hemoglobin 5.9 (LL) 12.0 - 16.0 g/dL    Hematocrit 19.2 (L) 36.0 - 46.0 %     (H) 80 - 100 fL    MCH 32.8 26.0 - 34.0 pg    MCHC 30.7 (L) 32.0 - 36.0 g/dL    RDW 15.4 (H) 11.5 - 14.5 %    Platelets 202 150 - 450 x10*3/uL    Neutrophils % 62.9 40.0 - 80.0 %    Immature Granulocytes %, Automated 2.3 (H) 0.0  - 0.9 %    Lymphocytes % 15.0 13.0 - 44.0 %    Monocytes % 15.2 2.0 - 10.0 %    Eosinophils % 4.3 0.0 - 6.0 %    Basophils % 0.3 0.0 - 2.0 %    Neutrophils Absolute 4.57 1.60 - 5.50 x10*3/uL    Immature Granulocytes Absolute, Automated 0.17 0.00 - 0.50 x10*3/uL    Lymphocytes Absolute 1.09 0.80 - 3.00 x10*3/uL    Monocytes Absolute 1.10 (H) 0.05 - 0.80 x10*3/uL    Eosinophils Absolute 0.31 0.00 - 0.40 x10*3/uL    Basophils Absolute 0.02 0.00 - 0.10 x10*3/uL   Comprehensive Metabolic Panel    Collection Time: 09/16/24  5:36 PM   Result Value Ref Range    Glucose 109 (H) 74 - 99 mg/dL    Sodium 139 136 - 145 mmol/L    Potassium 4.7 3.5 - 5.3 mmol/L    Chloride 107 98 - 107 mmol/L    Bicarbonate 20 (L) 21 - 32 mmol/L    Anion Gap 17 10 - 20 mmol/L    Urea Nitrogen 37 (H) 6 - 23 mg/dL    Creatinine 2.25 (H) 0.50 - 1.05 mg/dL    eGFR 21 (L) >60 mL/min/1.73m*2    Calcium 8.7 8.6 - 10.3 mg/dL    Albumin 3.5 3.4 - 5.0 g/dL    Alkaline Phosphatase 100 33 - 136 U/L    Total Protein 7.9 6.4 - 8.2 g/dL    AST 15 9 - 39 U/L    Bilirubin, Total 0.3 0.0 - 1.2 mg/dL    ALT 12 7 - 45 U/L   Magnesium    Collection Time: 09/16/24  5:36 PM   Result Value Ref Range    Magnesium 1.92 1.60 - 2.40 mg/dL   B-Type Natriuretic Peptide    Collection Time: 09/16/24  5:36 PM   Result Value Ref Range     (H) 0 - 99 pg/mL   D-Dimer, Quantitative VTE Exclusion    Collection Time: 09/16/24  5:36 PM   Result Value Ref Range    D-Dimer, Quantitative VTE Exclusion 562 (H) <=500 ng/mL FEU   Troponin I, High Sensitivity, Initial    Collection Time: 09/16/24  5:36 PM   Result Value Ref Range    Troponin I, High Sensitivity 21 (H) 0 - 13 ng/L   Morphology    Collection Time: 09/16/24  5:36 PM   Result Value Ref Range    RBC Morphology See Below     Hypochromia Mild     RBC Fragments Few     Ovalocytes Few    Sars-CoV-2 PCR    Collection Time: 09/16/24  5:37 PM   Result Value Ref Range    Coronavirus 2019, PCR Not Detected Not Detected   Influenza A,  and B PCR    Collection Time: 09/16/24  5:37 PM   Result Value Ref Range    Flu A Result Not Detected Not Detected    Flu B Result Not Detected Not Detected   Urinalysis with Reflex Culture and Microscopic    Collection Time: 09/16/24  5:45 PM   Result Value Ref Range    Color, Urine Light-Orange (N) Light-Yellow, Yellow, Dark-Yellow    Appearance, Urine Ex.Turbid (N) Clear    Specific Gravity, Urine 1.013 1.005 - 1.035    pH, Urine 5.0 5.0, 5.5, 6.0, 6.5, 7.0, 7.5, 8.0    Protein, Urine 30 (1+) (A) NEGATIVE, 10 (TRACE), 20 (TRACE) mg/dL    Glucose, Urine Normal Normal mg/dL    Blood, Urine 0.06 (1+) (A) NEGATIVE    Ketones, Urine NEGATIVE NEGATIVE mg/dL    Bilirubin, Urine NEGATIVE NEGATIVE    Urobilinogen, Urine Normal Normal mg/dL    Nitrite, Urine NEGATIVE NEGATIVE    Leukocyte Esterase, Urine 500 Philippe/µL (A) NEGATIVE   Extra Urine Gray Tube    Collection Time: 09/16/24  5:45 PM   Result Value Ref Range    Extra Tube Hold for add-ons.    Microscopic Only, Urine    Collection Time: 09/16/24  5:45 PM   Result Value Ref Range    WBC, Urine >50 (A) 1-5, NONE /HPF    WBC Clumps, Urine MANY Reference range not established. /HPF    RBC, Urine 6-10 (A) NONE, 1-2, 3-5 /HPF    Bacteria, Urine 4+ (A) NONE SEEN /HPF   ECG 12 lead    Collection Time: 09/16/24  5:52 PM   Result Value Ref Range    Ventricular Rate 96 BPM    Atrial Rate 96 BPM    AR Interval 138 ms    QRS Duration 84 ms    QT Interval 344 ms    QTC Calculation(Bazett) 434 ms    P Axis 70 degrees    R Axis 64 degrees    T Axis 57 degrees    QRS Count 15 beats    Q Onset 218 ms    P Onset 149 ms    P Offset 212 ms    T Offset 390 ms    QTC Fredericia 402 ms   Prepare RBC: 2 Units    Collection Time: 09/16/24  6:34 PM   Result Value Ref Range    PRODUCT CODE R6709T03     Unit Number [id number]     Unit ABO A     Unit RH POS     XM INTEP COMP     Dispense Status TR     Blood Expiration Date 10/10/2024 11:59:00 PM EDT     PRODUCT BLOOD TYPE 6200     UNIT VOLUME  350     PRODUCT CODE Z1797Q15     Unit Number [id number]     Unit ABO A     Unit RH POS     XM INTEP COMP     Dispense Status XM     Blood Expiration Date 10/9/2024 11:59:00 PM EDT     PRODUCT BLOOD TYPE 6200     UNIT VOLUME 350    Troponin, High Sensitivity, 1 Hour    Collection Time: 09/16/24  6:40 PM   Result Value Ref Range    Troponin I, High Sensitivity 22 (H) 0 - 13 ng/L   Type and screen    Collection Time: 09/16/24  6:40 PM   Result Value Ref Range    ABO TYPE A     Rh TYPE POS     ANTIBODY SCREEN NEG    ECG 12 lead    Collection Time: 09/16/24  6:51 PM   Result Value Ref Range    Ventricular Rate 111 BPM    Atrial Rate 111 BPM    IN Interval 136 ms    QRS Duration 82 ms    QT Interval 332 ms    QTC Calculation(Bazett) 451 ms    P Axis 73 degrees    R Axis 67 degrees    T Axis 51 degrees    QRS Count 19 beats    Q Onset 218 ms    P Onset 150 ms    P Offset 203 ms    T Offset 384 ms    QTC Fredericia 407 ms   Blood Culture    Collection Time: 09/16/24  7:29 PM    Specimen: Peripheral Venipuncture; Blood culture   Result Value Ref Range    Blood Culture Loaded on Instrument - Culture in progress    Blood Culture    Collection Time: 09/16/24  7:29 PM    Specimen: Peripheral Venipuncture; Blood culture   Result Value Ref Range    Blood Culture Loaded on Instrument - Culture in progress    SST TOP    Collection Time: 09/16/24  7:29 PM   Result Value Ref Range    Extra Tube Hold for add-ons.    C-Reactive Protein    Collection Time: 09/17/24 12:21 AM   Result Value Ref Range    C-Reactive Protein 7.44 (H) <1.00 mg/dL   Sedimentation rate, automated    Collection Time: 09/17/24 12:21 AM   Result Value Ref Range    Sedimentation Rate 36 (H) 0 - 30 mm/h   CBC and Auto Differential    Collection Time: 09/17/24  5:19 AM   Result Value Ref Range    WBC 6.5 4.4 - 11.3 x10*3/uL    nRBC 0.0 0.0 - 0.0 /100 WBCs    RBC 2.20 (L) 4.00 - 5.20 x10*6/uL    Hemoglobin 7.1 (L) 12.0 - 16.0 g/dL    Hematocrit 21.9 (L) 36.0 -  46.0 %     80 - 100 fL    MCH 32.3 26.0 - 34.0 pg    MCHC 32.4 32.0 - 36.0 g/dL    RDW 17.6 (H) 11.5 - 14.5 %    Platelets 189 150 - 450 x10*3/uL    Neutrophils % 64.0 40.0 - 80.0 %    Immature Granulocytes %, Automated 2.5 (H) 0.0 - 0.9 %    Lymphocytes % 14.2 13.0 - 44.0 %    Monocytes % 15.7 2.0 - 10.0 %    Eosinophils % 3.1 0.0 - 6.0 %    Basophils % 0.5 0.0 - 2.0 %    Neutrophils Absolute 4.17 1.60 - 5.50 x10*3/uL    Immature Granulocytes Absolute, Automated 0.16 0.00 - 0.50 x10*3/uL    Lymphocytes Absolute 0.92 0.80 - 3.00 x10*3/uL    Monocytes Absolute 1.02 (H) 0.05 - 0.80 x10*3/uL    Eosinophils Absolute 0.20 0.00 - 0.40 x10*3/uL    Basophils Absolute 0.03 0.00 - 0.10 x10*3/uL          Assessment:    This is an 88 yo Male with a PMH of CKD, HTN, HLD, TAVR, cachexia, former tobacco use, and Afib (not on AC) who presented to [facility] on 9/16/24 with reports of SOB and dry cough x 3 days.  GI was consulted for blood in stool.  She denies blood in the stool and states it's streaks on the TP about once a month r/t her hemorrhoids.  Denies constipation and has regular, daily BM's.  Hgb 5.9 upon admission and now 7.1 (baseline 8's), . Troponin 21 and .   Prior GI workup in 2021 for anemia was negative. 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  Colonoscopy-3/19/21 for DIEUDONNE showing a 2 mm polyp in cecum, 1 6-7 mm polyp in ascending, an 8-9 mm polyp in transverse, diverticula in sigmoid, IH. Currently has PNA, breathing appears stable.  Also has a UTI.  She is on Azithromycin and Rocephin.  She has received 1 unit of blood.      Plan  1.)  Blood in stool-Trend Hgb, monitor for overt bleeding, transfuse as necessary, avoid NSAIDs.  Patient declines any interventions given her cardia history and advanced age.  I do not think this is unreasonable at this time given no overt bleeding.  She mentions she would like to be discharged today.  If she does get discharged, please have her  follow-up with the GI office at [place] within 1 week of discharge with a CBC and iron panel.  Nephrology is consulted for worsening CKD with symptomatic anemia.  Iron panel pending.      Discussed patient with Dr. Cabrera.    I spent 65 minutes in the professional and overall care of this patient.      09/17/24 at 6:36 AM - DEBORAH Newton-CNP

## 2024-09-17 NOTE — H&P
Medical Group History and Physical    ASSESSMENT & PLAN:     Symptomatic anemia  DIEUDONNE v CKD v acute GI bleed  - Hemoglobin 5.9  - 2 units PRBC  - Keep active type and screen transfuse for hemoglobin less than 7  - Consult nephrology:  declining CKD  - hx DIEUDONNE tx ?  - Consult GI  - last EGD/colonoscopy 2021 [internal hemorrhoids]    UTI  - cont Rocephin IV    Suspected community acquired pneumonia  - Started on azithromycin and Rocephin in ER  - No leukocytosis, no fever, no hypoxia, no cough, or increased sputum on exam  -holding further doses of antibiotics at this time  - Check Pro-Magdaleno in a.m.  - elevated CRP  - trend BC x2  - Send sputum culture  - send urine studies    Hypothyroidism  - Synthroid  - Check TSH    VTE Prophylaxis: defer    Janet Kim, APRN-CNP    HISTORY OF PRESENT ILLNESS:   Chief Complaint: Shortness of breath and dry cough x 3 days    History Of Present Illness:    Bora Moreno is a 89 y.o. female with a significant past medical history of CKD, HTN, HLD, TAVR, cachexia, A-fib presenting to [facility] with  for complaints of shortness of breath and dry cough x 3 days.  Denies any fevers or chills, patient states she has lost a lot of weight in the last 6 months.  She reports good appetite no N/V/D, tolerating supplements Ensure strawberry, no hypoxia, lungs sound clear, tolerating room air, no cough or increased sputum production, low suspicion for pneumonia, although CXR did show new area of airspace disease in the medial right lung base suggesting linear atelectasis, middle lobe collapse, or pneumonia.  Flu and COVID swabs were negative. Lab work is significant for anemia with hemoglobin 5.9, hematocrit 19.2, no leukocytosis WBC 7.3, D-dimer 562, worsening renal function GFR 21, BUN 37, CR 2.25, Trop 21_22; CRP 7.44; UA positive for UTI.    At this time pain is well-controlled, VSS ready for admission under general medicine for symptomatic anemia with GI consult, urinary tract  infection, Nephrology consult for CKD and possible RML pneumonia.     Review of systems: 10 point review of systems is otherwise negative except as mentioned above.    PAST HISTORIES:       Past Medical History:  Medical Problems       Problem List       * (Principal) Pneumonia due to infectious organism, unspecified laterality, unspecified part of lung    Iron deficiency anemia secondary to inadequate dietary iron intake    Atrophy of vagina    Bilateral carotid artery stenosis    Coronary artery disease involving native coronary artery of native heart without angina pectoris    CKD stage G3b/A2, GFR 30-44 and albumin creatinine ratio  mg/g (Multi)    Primary osteoarthritis involving multiple joints    Female bladder prolapse    Essential hypertension    Mixed hyperlipidemia    Poor memory    S/P TAVR (transcatheter aortic valve replacement)    Severe mitral regurgitation    Weight loss    Nonrheumatic aortic valve stenosis    Blood glucose elevated    Mild persistent asthma without complication ([facility])    Other pancytopenia (Multi)    Cachexia (Multi)    Moderate dementia without behavioral disturbance, psychotic disturbance, mood disturbance, or anxiety, unspecified dementia type (Multi)    Peripheral vascular disease, unspecified (CMS-HCC)    Other nonthrombocytopenic purpura (CMS-HCC)    Former smoker    Body mass index (BMI) of 19.0 to 19.9 in adult    Atrial fibrillation, unspecified type (Multi)           Past Surgical History:  Past Surgical History:   Procedure Laterality Date    CARDIAC CATHETERIZATION Left     CATARACT EXTRACTION Right     OTHER SURGICAL HISTORY  04/29/2019    Hysterectomy    OTHER SURGICAL HISTORY  09/25/2021    Colonoscopy    OTHER SURGICAL HISTORY  09/25/2021    Dilation and curettage    OTHER SURGICAL HISTORY  09/25/2021    Esophagogastroduodenoscopy    OTHER SURGICAL HISTORY  10/20/2021    Transcatheter aortic valve replacement          Social History:  She reports that she  "quit smoking about 53 years ago. Her smoking use included cigarettes. She started smoking about 68 years ago. She has never been exposed to tobacco smoke. She has never used smokeless tobacco. She reports that she does not drink alcohol and does not use drugs.    Family History:  Family History   Problem Relation Name Age of Onset    Stroke Father      Other (cerebrovascular accident) Father          cerebrovascular accident (CVA)    Coronary artery disease Father          Allergies:  Captopril and Influenza virus vaccines    OBJECTIVE:       Last Recorded Vitals:  Vitals:    09/16/24 1435 09/16/24 1752 09/16/24 1758 09/16/24 1851   BP: (!) 191/75 (!) 190/75  158/71   BP Location: Right arm Left arm  Left arm   Patient Position: Sitting Lying  Lying   Pulse: 91 96 93 (!) 111   Resp: 20 20 20 20   Temp: 36.6 °C (97.9 °F)      TempSrc: Temporal      SpO2: 98% 99%  99%   Weight: 51.3 kg (113 lb)      Height: 1.702 m (5' 7\")          Last I/O:  No intake/output data recorded.    Physical Exam  Vitals and nursing note reviewed.   Constitutional:       Appearance: She is normal weight.   HENT:      Head: Normocephalic and atraumatic.      Nose: Nose normal.      Mouth/Throat:      Mouth: Mucous membranes are moist.      Pharynx: Oropharynx is clear.   Eyes:      Extraocular Movements: Extraocular movements intact.      Conjunctiva/sclera: Conjunctivae normal.      Pupils: Pupils are equal, round, and reactive to light.   Cardiovascular:      Rate and Rhythm: Normal rate and regular rhythm.      Pulses: Normal pulses.      Heart sounds: Normal heart sounds.   Pulmonary:      Effort: Pulmonary effort is normal.      Breath sounds: Normal breath sounds.      Comments: Clear bilaterally, no hypoxia, tolerating room air  Abdominal:      General: Abdomen is flat. Bowel sounds are normal.      Palpations: Abdomen is soft.   Genitourinary:     Comments: Not assessed    Musculoskeletal:         General: Normal range of motion.     "  Cervical back: Normal range of motion and neck supple.      Comments: No edema   Skin:     General: Skin is warm and dry.      Capillary Refill: Capillary refill takes less than 2 seconds.   Neurological:      Mental Status: She is alert. Mental status is at baseline.      Motor: Weakness present.      Gait: Gait abnormal.      Comments: Dementia, baseline mentation A+O x 2-3, memory impairment; able to walk in ER with 1 assist   Psychiatric:         Mood and Affect: Mood normal.         Behavior: Behavior normal.         Thought Content: Thought content normal.         Judgment: Judgment normal.           Scheduled Medications  azithromycin, 500 mg, intravenous, Once      PRN Medications    Continuous Medications       Outpatient Medications:  Prior to Admission medications    Medication Sig Start Date End Date Taking? Authorizing Provider   aspirin 81 mg EC tablet Take 1 tablet (81 mg) by mouth once daily. 6/14/21   Historical Provider, MD   atorvastatin (Lipitor) 20 mg tablet Take 1 tablet (20 mg) by mouth once daily at bedtime. 4/24/24 4/24/25  Karan Ashton DO   donepezil (Aricept) 5 mg tablet TAKE ONE TABLET BY MOUTH ONCE DAILY AT BEDTIME 2/20/24   Jermain Jones MD   fluticasone furoate-vilanteroL (Breo Ellipta) 200-25 mcg/dose inhaler USE 1 INHALATION DAILY 8/19/24   Jermain Jones MD   furosemide (Lasix) 20 mg tablet Take 1 tablet (20 mg) by mouth if needed (Take 1 as needed for swelling in ankles). 10/21/23   Historical Provider, MD   montelukast (Singulair) 10 mg tablet TAKE 1 TABLET DAILY 10/23/23   Jermain Jones MD   valsartan-hydrochlorothiazide (Diovan-HCT) 80-12.5 mg tablet TAKE ONE-HALF (1/2) TABLET DAILY 7/25/24   Jermian Jones MD       LABS AND IMAGING:     Labs:  Results for orders placed or performed during the hospital encounter of 09/16/24 (from the past 24 hour(s))   CBC and Auto Differential   Result Value Ref Range    WBC 7.3 4.4 - 11.3 x10*3/uL    nRBC 0.0 0.0 -  0.0 /100 WBCs    RBC 1.80 (L) 4.00 - 5.20 x10*6/uL    Hemoglobin 5.9 (LL) 12.0 - 16.0 g/dL    Hematocrit 19.2 (L) 36.0 - 46.0 %     (H) 80 - 100 fL    MCH 32.8 26.0 - 34.0 pg    MCHC 30.7 (L) 32.0 - 36.0 g/dL    RDW 15.4 (H) 11.5 - 14.5 %    Platelets 202 150 - 450 x10*3/uL    Neutrophils % 62.9 40.0 - 80.0 %    Immature Granulocytes %, Automated 2.3 (H) 0.0 - 0.9 %    Lymphocytes % 15.0 13.0 - 44.0 %    Monocytes % 15.2 2.0 - 10.0 %    Eosinophils % 4.3 0.0 - 6.0 %    Basophils % 0.3 0.0 - 2.0 %    Neutrophils Absolute 4.57 1.60 - 5.50 x10*3/uL    Immature Granulocytes Absolute, Automated 0.17 0.00 - 0.50 x10*3/uL    Lymphocytes Absolute 1.09 0.80 - 3.00 x10*3/uL    Monocytes Absolute 1.10 (H) 0.05 - 0.80 x10*3/uL    Eosinophils Absolute 0.31 0.00 - 0.40 x10*3/uL    Basophils Absolute 0.02 0.00 - 0.10 x10*3/uL   Comprehensive Metabolic Panel   Result Value Ref Range    Glucose 109 (H) 74 - 99 mg/dL    Sodium 139 136 - 145 mmol/L    Potassium 4.7 3.5 - 5.3 mmol/L    Chloride 107 98 - 107 mmol/L    Bicarbonate 20 (L) 21 - 32 mmol/L    Anion Gap 17 10 - 20 mmol/L    Urea Nitrogen 37 (H) 6 - 23 mg/dL    Creatinine 2.25 (H) 0.50 - 1.05 mg/dL    eGFR 21 (L) >60 mL/min/1.73m*2    Calcium 8.7 8.6 - 10.3 mg/dL    Albumin 3.5 3.4 - 5.0 g/dL    Alkaline Phosphatase 100 33 - 136 U/L    Total Protein 7.9 6.4 - 8.2 g/dL    AST 15 9 - 39 U/L    Bilirubin, Total 0.3 0.0 - 1.2 mg/dL    ALT 12 7 - 45 U/L   Magnesium   Result Value Ref Range    Magnesium 1.92 1.60 - 2.40 mg/dL   B-Type Natriuretic Peptide   Result Value Ref Range     (H) 0 - 99 pg/mL   D-Dimer, Quantitative VTE Exclusion   Result Value Ref Range    D-Dimer, Quantitative VTE Exclusion 562 (H) <=500 ng/mL FEU   Troponin I, High Sensitivity, Initial   Result Value Ref Range    Troponin I, High Sensitivity 21 (H) 0 - 13 ng/L   Morphology   Result Value Ref Range    RBC Morphology See Below     Hypochromia Mild     RBC Fragments Few     Ovalocytes Few     Sars-CoV-2 PCR   Result Value Ref Range    Coronavirus 2019, PCR Not Detected Not Detected   Influenza A, and B PCR   Result Value Ref Range    Flu A Result Not Detected Not Detected    Flu B Result Not Detected Not Detected   Urinalysis with Reflex Culture and Microscopic   Result Value Ref Range    Color, Urine Light-Orange (N) Light-Yellow, Yellow, Dark-Yellow    Appearance, Urine Ex.Turbid (N) Clear    Specific Gravity, Urine 1.013 1.005 - 1.035    pH, Urine 5.0 5.0, 5.5, 6.0, 6.5, 7.0, 7.5, 8.0    Protein, Urine 30 (1+) (A) NEGATIVE, 10 (TRACE), 20 (TRACE) mg/dL    Glucose, Urine Normal Normal mg/dL    Blood, Urine 0.06 (1+) (A) NEGATIVE    Ketones, Urine NEGATIVE NEGATIVE mg/dL    Bilirubin, Urine NEGATIVE NEGATIVE    Urobilinogen, Urine Normal Normal mg/dL    Nitrite, Urine NEGATIVE NEGATIVE    Leukocyte Esterase, Urine 500 Philippe/µL (A) NEGATIVE   Microscopic Only, Urine   Result Value Ref Range    WBC, Urine >50 (A) 1-5, NONE /HPF    WBC Clumps, Urine MANY Reference range not established. /HPF    RBC, Urine 6-10 (A) NONE, 1-2, 3-5 /HPF    Bacteria, Urine 4+ (A) NONE SEEN /HPF   ECG 12 lead   Result Value Ref Range    Ventricular Rate 96 BPM    Atrial Rate 96 BPM    MI Interval 138 ms    QRS Duration 84 ms    QT Interval 344 ms    QTC Calculation(Bazett) 434 ms    P Axis 70 degrees    R Axis 64 degrees    T Axis 57 degrees    QRS Count 15 beats    Q Onset 218 ms    P Onset 149 ms    P Offset 212 ms    T Offset 390 ms    QTC Fredericia 402 ms   Prepare RBC: 2 Units   Result Value Ref Range    PRODUCT CODE W9984A08     Unit Number [id number]     Unit ABO A     Unit RH POS     XM INTEP COMP     Dispense Status XM     Blood Expiration Date 10/10/2024 11:59:00 PM EDT     PRODUCT BLOOD TYPE 6200     UNIT VOLUME 350     PRODUCT CODE C0610K50     Unit Number [id number]     Unit ABO A     Unit RH POS     XM INTEP COMP     Dispense Status XM     Blood Expiration Date 10/9/2024 11:59:00 PM EDT     PRODUCT  BLOOD TYPE 6200     UNIT VOLUME 350    Troponin, High Sensitivity, 1 Hour   Result Value Ref Range    Troponin I, High Sensitivity 22 (H) 0 - 13 ng/L   Type and screen   Result Value Ref Range    ABO TYPE A     Rh TYPE POS     ANTIBODY SCREEN NEG    ECG 12 lead   Result Value Ref Range    Ventricular Rate 111 BPM    Atrial Rate 111 BPM    HI Interval 136 ms    QRS Duration 82 ms    QT Interval 332 ms    QTC Calculation(Bazett) 451 ms    P Axis 73 degrees    R Axis 67 degrees    T Axis 51 degrees    QRS Count 19 beats    Q Onset 218 ms    P Onset 150 ms    P Offset 203 ms    T Offset 384 ms    QTC Fredericia 407 ms        Imaging:  XR chest 1 view   Final Result   New area of airspace disease in the medial right lung base could be   some linear atelectasis or possibly a component of middle lobe   collapse or pneumonia.        Signed by: Rudi Nguyễn 9/16/2024 6:21 PM   Dictation workstation:   [id number]

## 2024-09-17 NOTE — PROGRESS NOTES
Physical Therapy    Physical Therapy Evaluation    Patient Name: Bora Moreno  MRN: [id number]  Today's Date: 9/17/2024   Time Calculation  Start Time: 1015  Stop Time: 1025  Time Calculation (min): 10 min  1009/1009-A    Assessment/Plan   PT Assessment  End of Session Communication: Bedside nurse  Assessment Comment: PT eval only pt at her baseine mobility  IND/SUP mobility .  no current PT needs at this time.  End of Session Patient Position:  (sitting up on EOB with  present.)  IP OR SWING BED PT PLAN  Inpatient or Swing Bed: Inpatient  PT Plan  PT Plan: PT Eval only  PT Frequency: PT eval only  PT Discharge Recommendations: No further acute PT  PT Recommended Transfer Status: Independent  PT - OK to Discharge: Yes (once medically ready for discharge to next level of care.)    Subjective     Current Problem:  1. Pneumonia due to infectious organism, unspecified laterality, unspecified part of lung        2. Urinary tract infection with hematuria, site unspecified        3. Anemia, unspecified type        4. Dyspnea, unspecified type          General Visit Information:  General  Reason for Referral: impaired mobility  Referred By: OT/PT Judy 9/17  Past Medical History Relevant to Rehab: Asthma,HLD,HTN,PVD,CAD,OA,dementia,Afib,CKD Anemia,TAVR  Family/Caregiver Present: Yes ()  Co-Treatment: OT  Co-Treatment Reason: to maximize pt safety  Prior to Session Communication: Bedside nurse (cleared to see for therapy evals.)  Patient Position Received: Alarm off, not on at start of session (sitting up at EOB  present . pt has tele)  General Comment: pt is an 89 yo female came to the hospital on 9/16 with reports of SOB and cough  dx PNA.  test/labs : hgb 7.1 up from 5.9 at admission recieved 2 units PRBCS.  CXR :  new area  of airspace disease R Lung base. possible PNA  GI consult.    Home Living:  Home Living  Home Living Comments: pt lives at home with her   1 level home no steps to enter.  pt has a tub/shower no equipment.    Prior Level of Function:  Prior Function Per Pt/Caregiver Report  Prior Function Comments: pt is IND with mobility and adls . spouse assistance with iadls.  no falls doesnt drive .    Precautions:  Precautions  Medical Precautions: Fall precautions  Objective     Pain:  Pain Assessment  Pain Assessment: 0-10  0-10 (Numeric) Pain Score: 0 - No pain    Cognition:  Cognition  Overall Cognitive Status: Impaired at baseline  Orientation Level: Disoriented to time  Memory:  (impaired)  Processing Speed: Delayed    General Assessments:      Activity Tolerance  Endurance: Endurance does not limit participation in activity  Sensation  Sensation Comment: intact BLEs  Strength  Strength Comments: BLE 5/5     Coordination  Movements are Fluid and Coordinated: Yes     Static Sitting Balance  Static Sitting-Comment/Number of Minutes: good  Dynamic Sitting Balance  Dynamic Sitting-Comments: good  Static Standing Balance  Static Standing-Comment/Number of Minutes: good  Dynamic Standing Balance  Dynamic Standing-Comments: good    Functional Assessments:     Bed Mobility  Bed Mobility: No  Transfers  Transfer: Yes  Transfer 1  Technique 1: Sit to stand, Stand to sit  Transfer Device 1:  (none)  Transfer Level of Assistance 1: Independent  Trials/Comments 1: ind no A/D  Ambulation/Gait Training  Ambulation/Gait Training Performed: Yes  Ambulation/Gait Training 1  Surface 1: Level tile  Device 1: No device  Assistance 1: Independent, Close supervision  Quality of Gait 1:  (slower gait speed.)  Comments/Distance (ft) 1: IND/Sup no A/D 150 ft . (pt at [facility] mobility IND / SUP  due to dementia)    Extremity/Trunk Assessments:    RLE   RLE : Within Functional Limits  LLE   LLE : Within Functional Limits    Outcome Measures:     [facility] Basic Mobility  Turning from your back to your side while in a flat bed without using bedrails: None  Moving from lying on your back to sitting on the side of a flat bed  without using bedrails: None  Moving to and from bed to chair (including a wheelchair): None  Standing up from a chair using your arms (e.g. wheelchair or bedside chair): None  To walk in hospital room: A little  Climbing 3-5 steps with railing: A little  Basic Mobility - Total Score: 22

## 2024-09-18 LAB
ALBUMIN SERPL BCP-MCNC: 3 G/DL (ref 3.4–5)
ALP SERPL-CCNC: 86 U/L (ref 33–136)
ALT SERPL W P-5'-P-CCNC: 10 U/L (ref 7–45)
ANION GAP SERPL CALC-SCNC: 16 MMOL/L (ref 10–20)
AST SERPL W P-5'-P-CCNC: 13 U/L (ref 9–39)
BASOPHILS # BLD AUTO: 0.02 X10*3/UL (ref 0–0.1)
BASOPHILS NFR BLD AUTO: 0.3 %
BILIRUB SERPL-MCNC: 0.2 MG/DL (ref 0–1.2)
BUN SERPL-MCNC: 39 MG/DL (ref 6–23)
CALCIUM SERPL-MCNC: 8.4 MG/DL (ref 8.6–10.3)
CHLORIDE SERPL-SCNC: 110 MMOL/L (ref 98–107)
CO2 SERPL-SCNC: 20 MMOL/L (ref 21–32)
CREAT SERPL-MCNC: 1.95 MG/DL (ref 0.5–1.05)
EGFRCR SERPLBLD CKD-EPI 2021: 25 ML/MIN/1.73M*2
EOSINOPHIL # BLD AUTO: 0.26 X10*3/UL (ref 0–0.4)
EOSINOPHIL NFR BLD AUTO: 3.9 %
ERYTHROCYTE [DISTWIDTH] IN BLOOD BY AUTOMATED COUNT: 17.7 % (ref 11.5–14.5)
GLUCOSE SERPL-MCNC: 99 MG/DL (ref 74–99)
HCT VFR BLD AUTO: 21.1 % (ref 36–46)
HGB BLD-MCNC: 6.6 G/DL (ref 12–16)
HOLD SPECIMEN: NORMAL
IMM GRANULOCYTES # BLD AUTO: 0.15 X10*3/UL (ref 0–0.5)
IMM GRANULOCYTES NFR BLD AUTO: 2.2 % (ref 0–0.9)
KAPPA LC SERPL-MCNC: 90.06 MG/DL (ref 0.33–1.94)
KAPPA LC/LAMBDA SER: 85.77 {RATIO} (ref 0.26–1.65)
LAMBDA LC SERPL-MCNC: 1.05 MG/DL (ref 0.57–2.63)
LYMPHOCYTES # BLD AUTO: 0.82 X10*3/UL (ref 0.8–3)
LYMPHOCYTES NFR BLD AUTO: 12.2 %
MAGNESIUM SERPL-MCNC: 1.94 MG/DL (ref 1.6–2.4)
MCH RBC QN AUTO: 32.2 PG (ref 26–34)
MCHC RBC AUTO-ENTMCNC: 31.3 G/DL (ref 32–36)
MCV RBC AUTO: 103 FL (ref 80–100)
MONOCYTES # BLD AUTO: 0.75 X10*3/UL (ref 0.05–0.8)
MONOCYTES NFR BLD AUTO: 11.2 %
NEUTROPHILS # BLD AUTO: 4.72 X10*3/UL (ref 1.6–5.5)
NEUTROPHILS NFR BLD AUTO: 70.2 %
NRBC BLD-RTO: 0 /100 WBCS (ref 0–0)
PLATELET # BLD AUTO: 186 X10*3/UL (ref 150–450)
POTASSIUM SERPL-SCNC: 4.7 MMOL/L (ref 3.5–5.3)
PROT SERPL-MCNC: 6.8 G/DL (ref 6.4–8.2)
PTH-INTACT SERPL-MCNC: 74.2 PG/ML (ref 18.5–88)
RBC # BLD AUTO: 2.05 X10*6/UL (ref 4–5.2)
SODIUM SERPL-SCNC: 141 MMOL/L (ref 136–145)
WBC # BLD AUTO: 6.7 X10*3/UL (ref 4.4–11.3)

## 2024-09-18 PROCEDURE — 99222 1ST HOSP IP/OBS MODERATE 55: CPT | Performed by: UROLOGY

## 2024-09-18 PROCEDURE — 84075 ASSAY ALKALINE PHOSPHATASE: CPT | Performed by: NURSE PRACTITIONER

## 2024-09-18 PROCEDURE — P9016 RBC LEUKOCYTES REDUCED: HCPCS

## 2024-09-18 PROCEDURE — 36415 COLL VENOUS BLD VENIPUNCTURE: CPT | Performed by: NURSE PRACTITIONER

## 2024-09-18 PROCEDURE — 2500000004 HC RX 250 GENERAL PHARMACY W/ HCPCS (ALT 636 FOR OP/ED): Performed by: INTERNAL MEDICINE

## 2024-09-18 PROCEDURE — 2500000001 HC RX 250 WO HCPCS SELF ADMINISTERED DRUGS (ALT 637 FOR MEDICARE OP): Performed by: NURSE PRACTITIONER

## 2024-09-18 PROCEDURE — 2500000004 HC RX 250 GENERAL PHARMACY W/ HCPCS (ALT 636 FOR OP/ED): Performed by: NURSE PRACTITIONER

## 2024-09-18 PROCEDURE — 1200000002 HC GENERAL ROOM WITH TELEMETRY DAILY

## 2024-09-18 PROCEDURE — 99232 SBSQ HOSP IP/OBS MODERATE 35: CPT

## 2024-09-18 PROCEDURE — 99233 SBSQ HOSP IP/OBS HIGH 50: CPT | Performed by: NURSE PRACTITIONER

## 2024-09-18 PROCEDURE — 36430 TRANSFUSION BLD/BLD COMPNT: CPT

## 2024-09-18 PROCEDURE — 2500000004 HC RX 250 GENERAL PHARMACY W/ HCPCS (ALT 636 FOR OP/ED): Performed by: STUDENT IN AN ORGANIZED HEALTH CARE EDUCATION/TRAINING PROGRAM

## 2024-09-18 PROCEDURE — 85025 COMPLETE CBC W/AUTO DIFF WBC: CPT | Performed by: NURSE PRACTITIONER

## 2024-09-18 PROCEDURE — 83735 ASSAY OF MAGNESIUM: CPT | Performed by: NURSE PRACTITIONER

## 2024-09-18 RX ORDER — HALOPERIDOL 5 MG/ML
2 INJECTION INTRAMUSCULAR ONCE
Status: COMPLETED | OUTPATIENT
Start: 2024-09-18 | End: 2024-09-18

## 2024-09-18 RX ORDER — DIPHENHYDRAMINE HYDROCHLORIDE 50 MG/ML
12.5 INJECTION INTRAMUSCULAR; INTRAVENOUS ONCE
Status: COMPLETED | OUTPATIENT
Start: 2024-09-18 | End: 2024-09-18

## 2024-09-18 RX ORDER — LORAZEPAM 2 MG/ML
1 INJECTION INTRAMUSCULAR ONCE
Status: COMPLETED | OUTPATIENT
Start: 2024-09-18 | End: 2024-09-18

## 2024-09-18 RX ORDER — SODIUM BICARBONATE 650 MG/1
650 TABLET ORAL 4 TIMES DAILY
Status: DISCONTINUED | OUTPATIENT
Start: 2024-09-18 | End: 2024-09-20

## 2024-09-18 ASSESSMENT — COGNITIVE AND FUNCTIONAL STATUS - GENERAL
DAILY ACTIVITIY SCORE: 24
MOBILITY SCORE: 24

## 2024-09-18 ASSESSMENT — PAIN - FUNCTIONAL ASSESSMENT
PAIN_FUNCTIONAL_ASSESSMENT: 0-10

## 2024-09-18 ASSESSMENT — PAIN SCALES - GENERAL
PAINLEVEL_OUTOF10: 0 - NO PAIN

## 2024-09-18 ASSESSMENT — ACTIVITIES OF DAILY LIVING (ADL): LACK_OF_TRANSPORTATION: NO

## 2024-09-18 NOTE — PROGRESS NOTES
"Daily Progress Note    Bora Moreno is a 89 y.o. female on day 2 of admission presenting with Pneumonia due to infectious organism, unspecified laterality, unspecified part of lung.    Subjective   Patient seen resting in bed, is ill-appearing.  States that she does not feel well overall, endorses tiredness, weakness.  Noted that she has not been interacting with family in the room.  Denies chest pain, shortness of breath, abdominal pain, N/V/D.       Objective   Physical Exam  Constitutional:       General: She is awake.      Appearance: She is ill-appearing.   HENT:      Head: Normocephalic.      Mouth/Throat:      Mouth: Mucous membranes are moist.   Eyes:      Pupils: Pupils are equal, round, and reactive to light.   Cardiovascular:      Rate and Rhythm: Normal rate and regular rhythm.      Heart sounds: Normal heart sounds, S1 normal and S2 normal.   Pulmonary:      Effort: Pulmonary effort is normal.      Breath sounds: Normal breath sounds.   Abdominal:      General: Bowel sounds are normal.      Palpations: Abdomen is soft.      Comments: Mild tenderness to palpation of left lower quadrant   Musculoskeletal:         General: Normal range of motion.      Cervical back: Neck supple.   Skin:     General: Skin is warm.   Neurological:      Mental Status: She is oriented to person, place, and time.      Motor: Weakness present.   Psychiatric:         Mood and Affect: Mood normal.         Behavior: Behavior normal.         Last Recorded Vitals  Blood pressure 133/66, pulse 88, temperature 36 °C (96.8 °F), temperature source Temporal, resp. rate 18, height 1.702 m (5' 7\"), weight 51.3 kg (113 lb), SpO2 97%.  Intake/Output last 3 Shifts:  I/O last 3 completed shifts:  In: 1710 (33.4 mL/kg) [P.O.:710; Blood:700; IV Piggyback:300]  Out: - (0 mL/kg)   Weight: 51.3 kg     Medications  Scheduled medications  [Held by provider] aspirin, 81 mg, oral, Daily  atorvastatin, 20 mg, oral, Nightly  cefTRIAXone, 1 g, " intravenous, q24h  donepezil, 5 mg, oral, Nightly  fluticasone furoate-vilanteroL, 1 puff, inhalation, Daily  [Held by provider] heparin (porcine), 5,000 Units, subcutaneous, q8h LEONARD  montelukast, 10 mg, oral, Daily  pantoprazole, 40 mg, oral, Daily   Or  pantoprazole, 40 mg, intravenous, Daily  sodium bicarbonate, 650 mg, oral, 4x daily      Continuous medications     PRN medications  PRN medications: acetaminophen **OR** acetaminophen **OR** acetaminophen, melatonin, ondansetron **OR** ondansetron    Labs  CBC:   Results from last 7 days   Lab Units 09/18/24 0534 09/17/24  0519 09/16/24  1736   WBC AUTO x10*3/uL 6.7 6.5 7.3   RBC AUTO x10*6/uL 2.05* 2.20* 1.80*   HEMOGLOBIN g/dL 6.6* 7.1* 5.9*   HEMATOCRIT % 21.1* 21.9* 19.2*   MCV fL 103* 100 107*   MCH pg 32.2 32.3 32.8   MCHC g/dL 31.3* 32.4 30.7*   RDW % 17.7* 17.6* 15.4*   PLATELETS AUTO x10*3/uL 186 189 202     CMP:    Results from last 7 days   Lab Units 09/18/24 0534 09/17/24  0519 09/16/24  1736   SODIUM mmol/L 141 139 139   POTASSIUM mmol/L 4.7 4.6 4.7   CHLORIDE mmol/L 110* 107 107   CO2 mmol/L 20* 21 20*   BUN mg/dL 39* 33* 37*   CREATININE mg/dL 1.95* 1.93* 2.25*   GLUCOSE mg/dL 99 92 109*   PROTEIN TOTAL g/dL 6.8 7.3 7.9   CALCIUM mg/dL 8.4* 8.4* 8.7   BILIRUBIN TOTAL mg/dL 0.2 0.3 0.3   ALK PHOS U/L 86 94 100   AST U/L 13 15 15   ALT U/L 10 11 12     BMP:    Results from last 7 days   Lab Units 09/18/24  0534 09/17/24  0519 09/16/24  1736   SODIUM mmol/L 141 139 139   POTASSIUM mmol/L 4.7 4.6 4.7   CHLORIDE mmol/L 110* 107 107   CO2 mmol/L 20* 21 20*   BUN mg/dL 39* 33* 37*   CREATININE mg/dL 1.95* 1.93* 2.25*   CALCIUM mg/dL 8.4* 8.4* 8.7   GLUCOSE mg/dL 99 92 109*     Magnesium:  Results from last 7 days   Lab Units 09/18/24  0534 09/17/24  0519 09/16/24  1736   MAGNESIUM mg/dL 1.94 1.81 1.92     Troponin:    Results from last 7 days   Lab Units 09/16/24  1840 09/16/24  1736   TROPHS ng/L 22* 21*     BNP:   Results from last 7 days   Lab Units  "09/16/24  1736   BNP pg/mL 182*     Lipid Panel:         Nutrition             Relevant Results  Results from last 7 days   Lab Units 09/18/24  0534 09/17/24  0519 09/16/24  1736   GLUCOSE mg/dL 99 92 109*     No results found for: \"HGBA1C\"     Assessment/Plan    Acute community-acquired pneumonia  -Finish IV azithromycin  -Will defer antibiotics after completion of azithromycin due to lack of symptoms, no fever, no leukocytosis  -Pro-Magdaleno 0.16  -Elevated ESR and CRP  -Follow blood cultures, no growth at 1 day  -Obtain sputum culture     Symptomatic anemia  -Consider iron deficiency anemia versus CKD versus acute GI bleed  -Hemoglobin was 5.9 on admission, 2 units PRBCs transfused  -Hemoglobin 6.6 today, 1 unit PRBCs transfused  -Trend H&H, transfuse for hemoglobin less than 7  -Iron studies show low TIBC, low iron, low percent saturation  -GI consult, trend hemoglobin, monitor for overt bleeding, transfuse as necessary, no acute intervention  -Per GI will need to follow-up within 1 week of discharge and have CBC and iron panel completed  -Nephrology consulted, continue IV iron and Retacrit, add sodium bicarb, hold Epogen  -Renal ultrasound shows moderate bilateral hydronephrosis  -Patient is elevated free light chains and urine  -Consult urology  -Consider heme onc consult     Acute UTI, uncomplicated  -Continue IV Rocephin  -UA with over 50 WBCs, 4+ bacteria, 500 leukocyte esterase  -Urine culture showing enteric bacilli  -Follow susceptibility  -Cr continues to increase, will continue to monitor     Hypothyroidism  -Continue home Synthroid  -TSH 3.30     DVTp: None for now  PLAN: Home    Jerica Pino PA-C    Plan of care was discussed extensively with patient.  Patient verbalized understanding through teach back method.  All question and concerns addressed upon examination.    Of note, this documentation is completed using the Dragon Dictation system (voice recognition software). There may be spelling and/or " grammatical errors that were not corrected prior to final submission.

## 2024-09-18 NOTE — PROGRESS NOTES
This note was created using voice recognition transcription software. Despite proofreading, unintentional typographical errors may be present. Please contact the GI office with any questions or concerns.       Subjective  Patient had no BM's overnight.  Per nursing, patient was trying to leave all throughout the night so she is very sleepy this morning.  Has family at the bedside.  Patient denies any bleeding overnight.          Physical Exam:  General: Sleepy  Skin:  Warm and dry, no jaundice  HEENT: No scleral icterus, no conjunctival pallor, normocephalic, atraumatic, mucous membranes moist  Neck:  atraumatic, trachea midline, no JVD  Chest:  Clear to auscultation bilaterally. No wheezes, rales, or rhonchi  CV:  Regular rate and rhythm.  Positive S1/S2  Abdomen: no distension, +BS, soft, non-tender to palpation, no rebound tenderness, no guarding, no rigidity, no discernible ascites   Extremities: no lower extremity edema, chronic pigmentary changes, no cyanosis  Neurological:  A&Ox3  Psychiatric: cooperative     Investigations:  Labs, radiological imaging and cardiac work up were reviewed        Objective:         9/17/2024     1:08 AM 9/17/2024     4:31 AM 9/17/2024     5:00 AM 9/17/2024     7:41 AM 9/17/2024     3:07 PM 9/17/2024     8:10 PM 9/18/2024    12:13 AM   Vitals   Systolic 173 159 164 137 155 134 131   Diastolic 76 69 72 63 70 64 60   Heart Rate 100 109 96 90 83 92 88   Temp   37.3 °C (99.1 °F) 36.5 °C (97.7 °F) 36.4 °C (97.5 °F) 36.4 °C (97.5 °F) 36.4 °C (97.5 °F)   Resp 20 20 16 19 21 16 16          Medications:  aspirin, 81 mg, oral, Daily  atorvastatin, 20 mg, oral, Nightly  cefTRIAXone, 1 g, intravenous, q24h  donepezil, 5 mg, oral, Nightly  fluticasone furoate-vilanteroL, 1 puff, inhalation, Daily  heparin (porcine), 5,000 Units, subcutaneous, q8h LEONARD  LORazepam, 1 mg, intravenous, Once  montelukast, 10 mg, oral, Daily  pantoprazole, 40 mg, oral, Daily   Or  pantoprazole, 40 mg, intravenous,  Daily         Recent Results (from the past 72 hour(s))   CBC and Auto Differential    Collection Time: 09/16/24  5:36 PM   Result Value Ref Range    WBC 7.3 4.4 - 11.3 x10*3/uL    nRBC 0.0 0.0 - 0.0 /100 WBCs    RBC 1.80 (L) 4.00 - 5.20 x10*6/uL    Hemoglobin 5.9 (LL) 12.0 - 16.0 g/dL    Hematocrit 19.2 (L) 36.0 - 46.0 %     (H) 80 - 100 fL    MCH 32.8 26.0 - 34.0 pg    MCHC 30.7 (L) 32.0 - 36.0 g/dL    RDW 15.4 (H) 11.5 - 14.5 %    Platelets 202 150 - 450 x10*3/uL    Neutrophils % 62.9 40.0 - 80.0 %    Immature Granulocytes %, Automated 2.3 (H) 0.0 - 0.9 %    Lymphocytes % 15.0 13.0 - 44.0 %    Monocytes % 15.2 2.0 - 10.0 %    Eosinophils % 4.3 0.0 - 6.0 %    Basophils % 0.3 0.0 - 2.0 %    Neutrophils Absolute 4.57 1.60 - 5.50 x10*3/uL    Immature Granulocytes Absolute, Automated 0.17 0.00 - 0.50 x10*3/uL    Lymphocytes Absolute 1.09 0.80 - 3.00 x10*3/uL    Monocytes Absolute 1.10 (H) 0.05 - 0.80 x10*3/uL    Eosinophils Absolute 0.31 0.00 - 0.40 x10*3/uL    Basophils Absolute 0.02 0.00 - 0.10 x10*3/uL   Comprehensive Metabolic Panel    Collection Time: 09/16/24  5:36 PM   Result Value Ref Range    Glucose 109 (H) 74 - 99 mg/dL    Sodium 139 136 - 145 mmol/L    Potassium 4.7 3.5 - 5.3 mmol/L    Chloride 107 98 - 107 mmol/L    Bicarbonate 20 (L) 21 - 32 mmol/L    Anion Gap 17 10 - 20 mmol/L    Urea Nitrogen 37 (H) 6 - 23 mg/dL    Creatinine 2.25 (H) 0.50 - 1.05 mg/dL    eGFR 21 (L) >60 mL/min/1.73m*2    Calcium 8.7 8.6 - 10.3 mg/dL    Albumin 3.5 3.4 - 5.0 g/dL    Alkaline Phosphatase 100 33 - 136 U/L    Total Protein 7.9 6.4 - 8.2 g/dL    AST 15 9 - 39 U/L    Bilirubin, Total 0.3 0.0 - 1.2 mg/dL    ALT 12 7 - 45 U/L   Magnesium    Collection Time: 09/16/24  5:36 PM   Result Value Ref Range    Magnesium 1.92 1.60 - 2.40 mg/dL   B-Type Natriuretic Peptide    Collection Time: 09/16/24  5:36 PM   Result Value Ref Range     (H) 0 - 99 pg/mL   D-Dimer, Quantitative VTE Exclusion    Collection Time: 09/16/24   5:36 PM   Result Value Ref Range    D-Dimer, Quantitative VTE Exclusion 562 (H) <=500 ng/mL FEU   Troponin I, High Sensitivity, Initial    Collection Time: 09/16/24  5:36 PM   Result Value Ref Range    Troponin I, High Sensitivity 21 (H) 0 - 13 ng/L   Morphology    Collection Time: 09/16/24  5:36 PM   Result Value Ref Range    RBC Morphology See Below     Hypochromia Mild     RBC Fragments Few     Ovalocytes Few    Sars-CoV-2 PCR    Collection Time: 09/16/24  5:37 PM   Result Value Ref Range    Coronavirus 2019, PCR Not Detected Not Detected   Influenza A, and B PCR    Collection Time: 09/16/24  5:37 PM   Result Value Ref Range    Flu A Result Not Detected Not Detected    Flu B Result Not Detected Not Detected   Urinalysis with Reflex Culture and Microscopic    Collection Time: 09/16/24  5:45 PM   Result Value Ref Range    Color, Urine Light-Orange (N) Light-Yellow, Yellow, Dark-Yellow    Appearance, Urine Ex.Turbid (N) Clear    Specific Gravity, Urine 1.013 1.005 - 1.035    pH, Urine 5.0 5.0, 5.5, 6.0, 6.5, 7.0, 7.5, 8.0    Protein, Urine 30 (1+) (A) NEGATIVE, 10 (TRACE), 20 (TRACE) mg/dL    Glucose, Urine Normal Normal mg/dL    Blood, Urine 0.06 (1+) (A) NEGATIVE    Ketones, Urine NEGATIVE NEGATIVE mg/dL    Bilirubin, Urine NEGATIVE NEGATIVE    Urobilinogen, Urine Normal Normal mg/dL    Nitrite, Urine NEGATIVE NEGATIVE    Leukocyte Esterase, Urine 500 Philippe/µL (A) NEGATIVE   Extra Urine Gray Tube    Collection Time: 09/16/24  5:45 PM   Result Value Ref Range    Extra Tube Hold for add-ons.    Microscopic Only, Urine    Collection Time: 09/16/24  5:45 PM   Result Value Ref Range    WBC, Urine >50 (A) 1-5, NONE /HPF    WBC Clumps, Urine MANY Reference range not established. /HPF    RBC, Urine 6-10 (A) NONE, 1-2, 3-5 /HPF    Bacteria, Urine 4+ (A) NONE SEEN /HPF   Urine Culture    Collection Time: 09/16/24  5:45 PM    Specimen: Clean Catch/Voided; Urine   Result Value Ref Range    Urine Culture >100,000 Enteric  bacilli (A)    ECG 12 lead    Collection Time: 09/16/24  5:52 PM   Result Value Ref Range    Ventricular Rate 96 BPM    Atrial Rate 96 BPM    DE Interval 138 ms    QRS Duration 84 ms    QT Interval 344 ms    QTC Calculation(Bazett) 434 ms    P Axis 70 degrees    R Axis 64 degrees    T Axis 57 degrees    QRS Count 15 beats    Q Onset 218 ms    P Onset 149 ms    P Offset 212 ms    T Offset 390 ms    QTC Fredericia 402 ms   Prepare RBC: 2 Units    Collection Time: 09/16/24  6:34 PM   Result Value Ref Range    PRODUCT CODE V9006B79     Unit Number [id number]     Unit ABO A     Unit RH POS     XM INTEP COMP     Dispense Status TR     Blood Expiration Date 10/10/2024 11:59:00 PM EDT     PRODUCT BLOOD TYPE 6200     UNIT VOLUME 350     PRODUCT CODE Z0856G00     Unit Number [id number]     Unit ABO A     Unit RH POS     XM INTEP COMP     Dispense Status XM     Blood Expiration Date 10/9/2024 11:59:00 PM EDT     PRODUCT BLOOD TYPE 6200     UNIT VOLUME 350    Troponin, High Sensitivity, 1 Hour    Collection Time: 09/16/24  6:40 PM   Result Value Ref Range    Troponin I, High Sensitivity 22 (H) 0 - 13 ng/L   Type and screen    Collection Time: 09/16/24  6:40 PM   Result Value Ref Range    ABO TYPE A     Rh TYPE POS     ANTIBODY SCREEN NEG    ECG 12 lead    Collection Time: 09/16/24  6:51 PM   Result Value Ref Range    Ventricular Rate 111 BPM    Atrial Rate 111 BPM    DE Interval 136 ms    QRS Duration 82 ms    QT Interval 332 ms    QTC Calculation(Bazett) 451 ms    P Axis 73 degrees    R Axis 67 degrees    T Axis 51 degrees    QRS Count 19 beats    Q Onset 218 ms    P Onset 150 ms    P Offset 203 ms    T Offset 384 ms    QTC Fredericia 407 ms   Blood Culture    Collection Time: 09/16/24  7:29 PM    Specimen: Peripheral Venipuncture; Blood culture   Result Value Ref Range    Blood Culture No growth at 1 day    Blood Culture    Collection Time: 09/16/24  7:29 PM    Specimen: Peripheral Venipuncture; Blood culture    Result Value Ref Range    Blood Culture No growth at 1 day    SST TOP    Collection Time: 09/16/24  7:29 PM   Result Value Ref Range    Extra Tube Hold for add-ons.    C-Reactive Protein    Collection Time: 09/17/24 12:21 AM   Result Value Ref Range    C-Reactive Protein 7.44 (H) <1.00 mg/dL   Sedimentation rate, automated    Collection Time: 09/17/24 12:21 AM   Result Value Ref Range    Sedimentation Rate 36 (H) 0 - 30 mm/h   Comprehensive metabolic panel    Collection Time: 09/17/24  5:19 AM   Result Value Ref Range    Glucose 92 74 - 99 mg/dL    Sodium 139 136 - 145 mmol/L    Potassium 4.6 3.5 - 5.3 mmol/L    Chloride 107 98 - 107 mmol/L    Bicarbonate 21 21 - 32 mmol/L    Anion Gap 16 10 - 20 mmol/L    Urea Nitrogen 33 (H) 6 - 23 mg/dL    Creatinine 1.93 (H) 0.50 - 1.05 mg/dL    eGFR 25 (L) >60 mL/min/1.73m*2    Calcium 8.4 (L) 8.6 - 10.3 mg/dL    Albumin 3.2 (L) 3.4 - 5.0 g/dL    Alkaline Phosphatase 94 33 - 136 U/L    Total Protein 7.3 6.4 - 8.2 g/dL    AST 15 9 - 39 U/L    Bilirubin, Total 0.3 0.0 - 1.2 mg/dL    ALT 11 7 - 45 U/L   CBC and Auto Differential    Collection Time: 09/17/24  5:19 AM   Result Value Ref Range    WBC 6.5 4.4 - 11.3 x10*3/uL    nRBC 0.0 0.0 - 0.0 /100 WBCs    RBC 2.20 (L) 4.00 - 5.20 x10*6/uL    Hemoglobin 7.1 (L) 12.0 - 16.0 g/dL    Hematocrit 21.9 (L) 36.0 - 46.0 %     80 - 100 fL    MCH 32.3 26.0 - 34.0 pg    MCHC 32.4 32.0 - 36.0 g/dL    RDW 17.6 (H) 11.5 - 14.5 %    Platelets 189 150 - 450 x10*3/uL    Neutrophils % 64.0 40.0 - 80.0 %    Immature Granulocytes %, Automated 2.5 (H) 0.0 - 0.9 %    Lymphocytes % 14.2 13.0 - 44.0 %    Monocytes % 15.7 2.0 - 10.0 %    Eosinophils % 3.1 0.0 - 6.0 %    Basophils % 0.5 0.0 - 2.0 %    Neutrophils Absolute 4.17 1.60 - 5.50 x10*3/uL    Immature Granulocytes Absolute, Automated 0.16 0.00 - 0.50 x10*3/uL    Lymphocytes Absolute 0.92 0.80 - 3.00 x10*3/uL    Monocytes Absolute 1.02 (H) 0.05 - 0.80 x10*3/uL    Eosinophils Absolute 0.20  0.00 - 0.40 x10*3/uL    Basophils Absolute 0.03 0.00 - 0.10 x10*3/uL   Magnesium    Collection Time: 09/17/24  5:19 AM   Result Value Ref Range    Magnesium 1.81 1.60 - 2.40 mg/dL   Procalcitonin    Collection Time: 09/17/24  5:19 AM   Result Value Ref Range    Procalcitonin 0.16 (H) <=0.07 ng/mL   SST TOP    Collection Time: 09/17/24  5:19 AM   Result Value Ref Range    Extra Tube Hold for add-ons.    TSH    Collection Time: 09/17/24  5:19 AM   Result Value Ref Range    Thyroid Stimulating Hormone 3.30 0.44 - 3.98 mIU/L   Iron and TIBC    Collection Time: 09/17/24  5:19 AM   Result Value Ref Range    Iron 29 (L) 35 - 150 ug/dL    UIBC 155 110 - 370 ug/dL    TIBC 184 (L) 240 - 445 ug/dL    % Saturation 16 (L) 25 - 45 %   Parathyroid Hormone, Intact    Collection Time: 09/17/24  4:50 PM   Result Value Ref Range    Parathyroid Hormone, Intact 74.2 18.5 - 88.0 pg/mL   Protein, Urine Random    Collection Time: 09/17/24  5:44 PM   Result Value Ref Range    Total Protein, Urine Random 78 (H) 5 - 24 mg/dL    Creatinine, Urine Random 100.1 20.0 - 320.0 mg/dL    T. Protein/Creatinine Ratio 0.78 (H) 0.00 - 0.17 mg/mg Creat   CBC and Auto Differential    Collection Time: 09/18/24  5:34 AM   Result Value Ref Range    WBC 6.7 4.4 - 11.3 x10*3/uL    nRBC 0.0 0.0 - 0.0 /100 WBCs    RBC 2.05 (L) 4.00 - 5.20 x10*6/uL    Hemoglobin 6.6 (L) 12.0 - 16.0 g/dL    Hematocrit 21.1 (L) 36.0 - 46.0 %     (H) 80 - 100 fL    MCH 32.2 26.0 - 34.0 pg    MCHC 31.3 (L) 32.0 - 36.0 g/dL    RDW 17.7 (H) 11.5 - 14.5 %    Platelets 186 150 - 450 x10*3/uL    Neutrophils %      Immature Granulocytes %, Automated      Lymphocytes %      Monocytes %      Eosinophils %      Basophils %      Neutrophils Absolute      Lymphocytes Absolute      Monocytes Absolute      Eosinophils Absolute      Basophils Absolute            Assessment:    This is an 88 yo Male with a PMH of CKD, HTN, HLD, TAVR, cachexia, former tobacco use, and Afib (not on AC) who  presented to [facility] on 9/16/24 with reports of SOB and dry cough x 3 days.  GI was consulted for blood in stool.  Denies blood in stool. Declines interventions despite me explaining that she could have cancer or a GIB.  +DIEUDONNE.         Plan  1.)  Blood in stool-Trend Hgb, monitor for overt bleeding, transfuse as necessary, avoid NSAIDs.  Patient declines any interventions given her cardiac history and advanced age as stated above. Iron per primary team.  Will continue to follow.     Discussed patient with Dr. Cabrera.       I spent 30 minutes in the professional and overall care of this patient.      09/18/24 at 6:18 AM - DEBORAH Newton-CNP

## 2024-09-18 NOTE — CONSULTS
Reason For Consult  Hydronephrosis, MAKEDA    History Of Present Illness  Bora Moreno is a 89-year-old female with dementia, CKD 4, history of pelvic organ prolapse with resulting bilateral hydronephrosis, prior TAVR, hyperlipidemia, currently admitted with cough, shortness of breath.  Urology is consulted for renal bladder ultrasound showing bilateral hydronephrosis.  According to the patient's family and her  who is at the bedside, she has no difficulty with voiding.  Patient has not complained of dysuria, frequency, urgency, nocturia, hematuria.  Hemoglobin found to be 5.9, WBC 7.3, GFR 21 and UA with possible UTI.    Serum creatinine 1.95 from baseline of roughly 1.78    On review of her chart, she had a CT for her TAVR back in 2021 which showed similar bilateral hydroureteronephrosis as well as pelvic organ prolapse.  A pessary was performed as a temporary measure to treat her pelvic organ prolapse and this resolved the hydronephrosis.  However, unfortunately she had severe diarrhea and that her and her  attributed to the pessary and therefore they stopped using the pessary.  She has not used a pessary for roughly 2 to 3 years..k     Past Medical History  She has a past medical history of COPD (chronic obstructive pulmonary disease) (Multi), High blood cholesterol, Hypertension, and Personal history of other diseases of the circulatory system (07/29/2022).    Surgical History  She has a past surgical history that includes Other surgical history (04/29/2019); Other surgical history (09/25/2021); Other surgical history (09/25/2021); Other surgical history (09/25/2021); Other surgical history (10/20/2021); Cataract extraction (Right); and Cardiac catheterization (Left).     Social History  She reports that she quit smoking about 53 years ago. Her smoking use included cigarettes. She started smoking about 68 years ago. She has never been exposed to tobacco smoke. She has never used smokeless  "tobacco. She reports that she does not drink alcohol and does not use drugs.    Family History  Family History   Problem Relation Name Age of Onset    Stroke Father      Other (cerebrovascular accident) Father          cerebrovascular accident (CVA)    Coronary artery disease Father          Allergies  Captopril and Influenza virus vaccines    Review of Systems  Patient was unable to complete a review of systems due to mental status     Physical Exam  Constitutional: in NAD, sleeping comfortably  Head: normocephalic, atraumatic  Eyes: Anicteric, moist conjunctivae   Neck: Trachea midline, no thyromegaly  Respiratory: normal effort, no use of accessory muscles  Cardiovascular: no edema noted, rrr, no m/r/g  Skin: normal turgor, no rashes on examined skin  Neurologic: grossly intact, oriented to person/place/time  Psychiatric: Sleepy, unable to easily arouse despite it being the middle of the afternoon  MSK/extremities: no apparent limitations to range-of-motion, ambulatory, extremities non-edematous    Abdominal: non-distended, non-tender, no palpable masses, bladder non-palpable  : Patient and family declined exam       Last Recorded Vitals  Blood pressure 158/70, pulse 86, temperature 37 °C (98.6 °F), resp. rate 18, height 1.702 m (5' 7\"), weight 51.3 kg (113 lb), SpO2 95%.    Relevant Results      See HPI     Assessment/Plan     89-year-old female admitted with urinary tract infection, bilateral hydronephrosis, cough, anemia    # Bilateral hydronephrosis, acute kidney injury  -Suspect secondary to patient's known pelvic organ prolapse.  As noted in HPI, she had this treated 2021 with a temporary pessary but the patient did not tolerate it well and therefore was removed.  While she had the pessary in place, hydronephrosis had resolved  -Regardless, she has had this since 2021 with only recent decline in renal function  -In light of this, I recommend rehydration, treatment of UTI and anemia to see if her renal " function improves  -May benefit from a pessary versus colpocleisis if she is a surgical candidate as an outpatient electively    # Acute urinary tract infection, complicated  -Growing greater than 100,000 enteric bacilli  -Continue broad-spectrum antibiotics, await sensitivities, recommend 7-day course for complicated UTI    Marvin Silvestre MD

## 2024-09-18 NOTE — PROGRESS NOTES
Renal Progress Note  Assessment/  89 y.o. yo female admitted with sob and anemia.  Has underlying ckd stage 4.  Has been abnormal for some time at least since 2019.  Risk factors of HTN, Hyperlipidemia and TAVR.  Has 1+ proteinuria.  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  Normal EF.  Slight hypoalbuminemia.  Anemic with low iron levels        Plan/  Pt doesn't want extensive work up and refused GI eval per their note  If patient is being discharged could follow up as outpt  Repeat ultrasound right kidney 9.4 left kidney 10 right kidney moderate hydronephrosis left kidney as well no nephrolithiasis with the impression of moderate bilateral hydronephrosis  Patient will benefit from urology consult  IV iron and retacrit for anemia  Check spep/free light chains did show iron level corrected calcium to albumin 9.2  Quantify proteinuria showed 0.7 g proteinuria  Normal PTH  Will add sodium bicarb  Will hold Epogen  No change to her outpt meds right now from my standpoint.    May consider hematology oncology and urology consult   outpatient follow up from renal standpoint: Dr. Major    Subjective:   Admit Date: 9/16/2024    Interval History: Patient seen and examined uneventful night no uremic related or tired weak hardly communicating family in the room      Medications:   Scheduled Meds:[Held by provider] aspirin, 81 mg, oral, Daily  atorvastatin, 20 mg, oral, Nightly  cefTRIAXone, 1 g, intravenous, q24h  donepezil, 5 mg, oral, Nightly  fluticasone furoate-vilanteroL, 1 puff, inhalation, Daily  [Held by provider] heparin (porcine), 5,000 Units, subcutaneous, q8h LEONARD  montelukast, 10 mg, oral, Daily  pantoprazole, 40 mg, oral, Daily   Or  pantoprazole, 40 mg, intravenous, Daily      Continuous Infusions:     CBC:   Lab Results   Component Value Date    HGB 6.6 (L) 09/18/2024    HGB 7.1 (L) 09/17/2024    WBC 6.7 09/18/2024    WBC 6.5 09/17/2024     09/18/2024     09/17/2024      Anemia:    Lab  "Results   Component Value Date    IRON 29 (L) 09/17/2024    TIBC 184 (L) 09/17/2024      BMP:    Lab Results   Component Value Date     09/18/2024     09/17/2024    K 4.7 09/18/2024    K 4.6 09/17/2024     (H) 09/18/2024     09/17/2024    CO2 20 (L) 09/18/2024    CO2 21 09/17/2024    BUN 39 (H) 09/18/2024    BUN 33 (H) 09/17/2024    CREATININE 1.95 (H) 09/18/2024    CREATININE 1.93 (H) 09/17/2024      Bone disease:   Lab Results   Component Value Date    PTH 74.2 09/17/2024      Urinalysis:    Lab Results   Component Value Date    NENA 5.0 09/16/2024    PROTUR 30 (1+) (A) 09/16/2024    GLUCOSEU Normal 09/16/2024    BLOODU 0.06 (1+) (A) 09/16/2024    KETONESU NEGATIVE 09/16/2024    BILIRUBINU NEGATIVE 09/16/2024    NITRITEU NEGATIVE 09/16/2024    LEUKOCYTESU 500 Philippe/µL (A) 09/16/2024    UTPCR 0.78 (H) 09/17/2024        Objective:   Vitals: /88   Pulse 88   Temp 36 °C (96.8 °F) (Temporal)   Resp 18   Ht 1.702 m (5' 7\")   Wt 51.3 kg (113 lb)   SpO2 97%   BMI 17.70 kg/m²    Wt Readings from Last 3 Encounters:   09/16/24 51.3 kg (113 lb)   07/01/24 49.9 kg (110 lb)   03/29/24 51.3 kg (113 lb 3.2 oz)      24HR INTAKE/OUTPUT:    Intake/Output Summary (Last 24 hours) at 9/18/2024 1124  Last data filed at 9/18/2024 1100  Gross per 24 hour   Intake 420 ml   Output --   Net 420 ml     Admission weight:  Weight: 51.3 kg (113 lb)      Constitutional:  Alert, awake, no apparent distress   Skin:normal, no rash  HEENT:sclera anicteric.  Head atraumatic normocephalic  Neck:supple with no thyromegally  Cardiovascular:  S1, S2 without m/r/g   Respiratory:  CTA B without w/r/r   Abdomen: +bs, soft, nt  Ext: no LE edema  Musculoskeletal:Intact  Neuro:Alert and oriented with no deficit      Electronically signed by Gera Soler MD on 9/18/2024 at 11:24 AM            "

## 2024-09-19 LAB
ALBUMIN SERPL BCP-MCNC: 3 G/DL (ref 3.4–5)
ALP SERPL-CCNC: 83 U/L (ref 33–136)
ALT SERPL W P-5'-P-CCNC: 9 U/L (ref 7–45)
ANION GAP SERPL CALC-SCNC: 16 MMOL/L (ref 10–20)
AST SERPL W P-5'-P-CCNC: 12 U/L (ref 9–39)
BACTERIA UR CULT: ABNORMAL
BASOPHILS # BLD AUTO: 0.02 X10*3/UL (ref 0–0.1)
BASOPHILS NFR BLD AUTO: 0.3 %
BILIRUB SERPL-MCNC: 0.3 MG/DL (ref 0–1.2)
BLOOD EXPIRATION DATE: NORMAL
BLOOD EXPIRATION DATE: NORMAL
BUN SERPL-MCNC: 31 MG/DL (ref 6–23)
CALCIUM SERPL-MCNC: 8.8 MG/DL (ref 8.6–10.3)
CHLORIDE SERPL-SCNC: 110 MMOL/L (ref 98–107)
CO2 SERPL-SCNC: 21 MMOL/L (ref 21–32)
CREAT SERPL-MCNC: 1.65 MG/DL (ref 0.5–1.05)
DISPENSE STATUS: NORMAL
DISPENSE STATUS: NORMAL
EGFRCR SERPLBLD CKD-EPI 2021: 30 ML/MIN/1.73M*2
EOSINOPHIL # BLD AUTO: 0.23 X10*3/UL (ref 0–0.4)
EOSINOPHIL NFR BLD AUTO: 3.4 %
ERYTHROCYTE [DISTWIDTH] IN BLOOD BY AUTOMATED COUNT: 18.4 % (ref 11.5–14.5)
GLUCOSE SERPL-MCNC: 88 MG/DL (ref 74–99)
HCT VFR BLD AUTO: 25.5 % (ref 36–46)
HGB BLD-MCNC: 8.2 G/DL (ref 12–16)
HOLD SPECIMEN: NORMAL
IMM GRANULOCYTES # BLD AUTO: 0.21 X10*3/UL (ref 0–0.5)
IMM GRANULOCYTES NFR BLD AUTO: 3.1 % (ref 0–0.9)
LYMPHOCYTES # BLD AUTO: 0.73 X10*3/UL (ref 0.8–3)
LYMPHOCYTES NFR BLD AUTO: 10.7 %
MAGNESIUM SERPL-MCNC: 1.89 MG/DL (ref 1.6–2.4)
MCH RBC QN AUTO: 31.8 PG (ref 26–34)
MCHC RBC AUTO-ENTMCNC: 32.2 G/DL (ref 32–36)
MCV RBC AUTO: 99 FL (ref 80–100)
MONOCYTES # BLD AUTO: 0.73 X10*3/UL (ref 0.05–0.8)
MONOCYTES NFR BLD AUTO: 10.7 %
NEUTROPHILS # BLD AUTO: 4.89 X10*3/UL (ref 1.6–5.5)
NEUTROPHILS NFR BLD AUTO: 71.8 %
NRBC BLD-RTO: 0 /100 WBCS (ref 0–0)
PLATELET # BLD AUTO: 189 X10*3/UL (ref 150–450)
POTASSIUM SERPL-SCNC: 4.5 MMOL/L (ref 3.5–5.3)
PRODUCT BLOOD TYPE: 6200
PRODUCT BLOOD TYPE: 6200
PRODUCT CODE: NORMAL
PRODUCT CODE: NORMAL
PROT SERPL-MCNC: 6.8 G/DL (ref 6.4–8.2)
PROT SERPL-MCNC: 7 G/DL (ref 6.4–8.2)
RBC # BLD AUTO: 2.58 X10*6/UL (ref 4–5.2)
SODIUM SERPL-SCNC: 142 MMOL/L (ref 136–145)
UNIT ABO: NORMAL
UNIT ABO: NORMAL
UNIT NUMBER: NORMAL
UNIT NUMBER: NORMAL
UNIT RH: NORMAL
UNIT RH: NORMAL
UNIT VOLUME: 350
UNIT VOLUME: 350
WBC # BLD AUTO: 6.8 X10*3/UL (ref 4.4–11.3)
XM INTEP: NORMAL
XM INTEP: NORMAL

## 2024-09-19 PROCEDURE — 83735 ASSAY OF MAGNESIUM: CPT

## 2024-09-19 PROCEDURE — 99232 SBSQ HOSP IP/OBS MODERATE 35: CPT

## 2024-09-19 PROCEDURE — 2500000001 HC RX 250 WO HCPCS SELF ADMINISTERED DRUGS (ALT 637 FOR MEDICARE OP): Performed by: NURSE PRACTITIONER

## 2024-09-19 PROCEDURE — 84075 ASSAY ALKALINE PHOSPHATASE: CPT

## 2024-09-19 PROCEDURE — 1200000002 HC GENERAL ROOM WITH TELEMETRY DAILY

## 2024-09-19 PROCEDURE — 2500000004 HC RX 250 GENERAL PHARMACY W/ HCPCS (ALT 636 FOR OP/ED): Performed by: NURSE PRACTITIONER

## 2024-09-19 PROCEDURE — 36415 COLL VENOUS BLD VENIPUNCTURE: CPT

## 2024-09-19 PROCEDURE — 2500000004 HC RX 250 GENERAL PHARMACY W/ HCPCS (ALT 636 FOR OP/ED): Performed by: INTERNAL MEDICINE

## 2024-09-19 PROCEDURE — 99232 SBSQ HOSP IP/OBS MODERATE 35: CPT | Performed by: NURSE PRACTITIONER

## 2024-09-19 PROCEDURE — 85025 COMPLETE CBC W/AUTO DIFF WBC: CPT

## 2024-09-19 ASSESSMENT — COGNITIVE AND FUNCTIONAL STATUS - GENERAL
WALKING IN HOSPITAL ROOM: A LITTLE
MOVING TO AND FROM BED TO CHAIR: A LITTLE
DAILY ACTIVITIY SCORE: 24
DAILY ACTIVITIY SCORE: 24
MOBILITY SCORE: 21
WALKING IN HOSPITAL ROOM: A LITTLE
MOBILITY SCORE: 21
MOVING TO AND FROM BED TO CHAIR: A LITTLE
STANDING UP FROM CHAIR USING ARMS: A LITTLE
STANDING UP FROM CHAIR USING ARMS: A LITTLE

## 2024-09-19 ASSESSMENT — PAIN - FUNCTIONAL ASSESSMENT: PAIN_FUNCTIONAL_ASSESSMENT: 0-10

## 2024-09-19 ASSESSMENT — PAIN SCALES - GENERAL
PAINLEVEL_OUTOF10: 0 - NO PAIN
PAINLEVEL_OUTOF10: 0 - NO PAIN

## 2024-09-19 NOTE — PROGRESS NOTES
This note was created using voice recognition transcription software. Despite proofreading, unintentional typographical errors may be present. Please contact the GI office with any questions or concerns.       Subjective  Patient had no BM's overnight.  Patient denies any bleeding overnight.          Physical Exam:  General: Sleepy  Skin:  Warm and dry, no jaundice  HEENT: No scleral icterus, no conjunctival pallor, normocephalic, atraumatic, mucous membranes moist  Neck:  atraumatic, trachea midline, no JVD  Chest:  Clear to auscultation bilaterally. No wheezes, rales, or rhonchi  CV:  Regular rate and rhythm.  Positive S1/S2  Abdomen: no distension, +BS, soft, non-tender to palpation, no rebound tenderness, no guarding, no rigidity, no discernible ascites   Extremities: no lower extremity edema, chronic pigmentary changes, no cyanosis  Neurological:  A&Ox3  Psychiatric: cooperative     Investigations:  Labs, radiological imaging and cardiac work up were reviewed        Objective:         9/18/2024     4:34 PM 9/18/2024     8:02 PM 9/19/2024    12:00 AM 9/19/2024     4:47 AM 9/19/2024     7:33 AM 9/19/2024    11:47 AM 9/19/2024     1:28 PM   Vitals   Systolic 158 146 138 155 163 185 138   Diastolic 70 65 89 72 75 79 64   Heart Rate 86 89 63 87 86 102    Temp 37 °C (98.6 °F) 36.9 °C (98.4 °F) 37 °C (98.6 °F) 36.3 °C (97.3 °F) 36.7 °C (98.1 °F) 37.2 °C (99 °F)    Resp   18 18             Medications:  [Held by provider] aspirin, 81 mg, oral, Daily  atorvastatin, 20 mg, oral, Nightly  cefTRIAXone, 1 g, intravenous, q24h  donepezil, 5 mg, oral, Nightly  fluticasone furoate-vilanteroL, 1 puff, inhalation, Daily  [Held by provider] heparin (porcine), 5,000 Units, subcutaneous, q8h LEONARD  montelukast, 10 mg, oral, Daily  pantoprazole, 40 mg, oral, Daily   Or  pantoprazole, 40 mg, intravenous, Daily  sodium bicarbonate, 650 mg, oral, 4x daily         Recent Results (from the past 72 hour(s))   CBC and Auto Differential     Collection Time: 09/16/24  5:36 PM   Result Value Ref Range    WBC 7.3 4.4 - 11.3 x10*3/uL    nRBC 0.0 0.0 - 0.0 /100 WBCs    RBC 1.80 (L) 4.00 - 5.20 x10*6/uL    Hemoglobin 5.9 (LL) 12.0 - 16.0 g/dL    Hematocrit 19.2 (L) 36.0 - 46.0 %     (H) 80 - 100 fL    MCH 32.8 26.0 - 34.0 pg    MCHC 30.7 (L) 32.0 - 36.0 g/dL    RDW 15.4 (H) 11.5 - 14.5 %    Platelets 202 150 - 450 x10*3/uL    Neutrophils % 62.9 40.0 - 80.0 %    Immature Granulocytes %, Automated 2.3 (H) 0.0 - 0.9 %    Lymphocytes % 15.0 13.0 - 44.0 %    Monocytes % 15.2 2.0 - 10.0 %    Eosinophils % 4.3 0.0 - 6.0 %    Basophils % 0.3 0.0 - 2.0 %    Neutrophils Absolute 4.57 1.60 - 5.50 x10*3/uL    Immature Granulocytes Absolute, Automated 0.17 0.00 - 0.50 x10*3/uL    Lymphocytes Absolute 1.09 0.80 - 3.00 x10*3/uL    Monocytes Absolute 1.10 (H) 0.05 - 0.80 x10*3/uL    Eosinophils Absolute 0.31 0.00 - 0.40 x10*3/uL    Basophils Absolute 0.02 0.00 - 0.10 x10*3/uL   Comprehensive Metabolic Panel    Collection Time: 09/16/24  5:36 PM   Result Value Ref Range    Glucose 109 (H) 74 - 99 mg/dL    Sodium 139 136 - 145 mmol/L    Potassium 4.7 3.5 - 5.3 mmol/L    Chloride 107 98 - 107 mmol/L    Bicarbonate 20 (L) 21 - 32 mmol/L    Anion Gap 17 10 - 20 mmol/L    Urea Nitrogen 37 (H) 6 - 23 mg/dL    Creatinine 2.25 (H) 0.50 - 1.05 mg/dL    eGFR 21 (L) >60 mL/min/1.73m*2    Calcium 8.7 8.6 - 10.3 mg/dL    Albumin 3.5 3.4 - 5.0 g/dL    Alkaline Phosphatase 100 33 - 136 U/L    Total Protein 7.9 6.4 - 8.2 g/dL    AST 15 9 - 39 U/L    Bilirubin, Total 0.3 0.0 - 1.2 mg/dL    ALT 12 7 - 45 U/L   Magnesium    Collection Time: 09/16/24  5:36 PM   Result Value Ref Range    Magnesium 1.92 1.60 - 2.40 mg/dL   B-Type Natriuretic Peptide    Collection Time: 09/16/24  5:36 PM   Result Value Ref Range     (H) 0 - 99 pg/mL   D-Dimer, Quantitative VTE Exclusion    Collection Time: 09/16/24  5:36 PM   Result Value Ref Range    D-Dimer, Quantitative VTE Exclusion 562 (H) <=500  ng/mL FEU   Troponin I, High Sensitivity, Initial    Collection Time: 09/16/24  5:36 PM   Result Value Ref Range    Troponin I, High Sensitivity 21 (H) 0 - 13 ng/L   Morphology    Collection Time: 09/16/24  5:36 PM   Result Value Ref Range    RBC Morphology See Below     Hypochromia Mild     RBC Fragments Few     Ovalocytes Few    Sars-CoV-2 PCR    Collection Time: 09/16/24  5:37 PM   Result Value Ref Range    Coronavirus 2019, PCR Not Detected Not Detected   Influenza A, and B PCR    Collection Time: 09/16/24  5:37 PM   Result Value Ref Range    Flu A Result Not Detected Not Detected    Flu B Result Not Detected Not Detected   Urinalysis with Reflex Culture and Microscopic    Collection Time: 09/16/24  5:45 PM   Result Value Ref Range    Color, Urine Light-Orange (N) Light-Yellow, Yellow, Dark-Yellow    Appearance, Urine Ex.Turbid (N) Clear    Specific Gravity, Urine 1.013 1.005 - 1.035    pH, Urine 5.0 5.0, 5.5, 6.0, 6.5, 7.0, 7.5, 8.0    Protein, Urine 30 (1+) (A) NEGATIVE, 10 (TRACE), 20 (TRACE) mg/dL    Glucose, Urine Normal Normal mg/dL    Blood, Urine 0.06 (1+) (A) NEGATIVE    Ketones, Urine NEGATIVE NEGATIVE mg/dL    Bilirubin, Urine NEGATIVE NEGATIVE    Urobilinogen, Urine Normal Normal mg/dL    Nitrite, Urine NEGATIVE NEGATIVE    Leukocyte Esterase, Urine 500 Philippe/µL (A) NEGATIVE   Extra Urine Gray Tube    Collection Time: 09/16/24  5:45 PM   Result Value Ref Range    Extra Tube Hold for add-ons.    Microscopic Only, Urine    Collection Time: 09/16/24  5:45 PM   Result Value Ref Range    WBC, Urine >50 (A) 1-5, NONE /HPF    WBC Clumps, Urine MANY Reference range not established. /HPF    RBC, Urine 6-10 (A) NONE, 1-2, 3-5 /HPF    Bacteria, Urine 4+ (A) NONE SEEN /HPF   Urine Culture    Collection Time: 09/16/24  5:45 PM    Specimen: Clean Catch/Voided; Urine   Result Value Ref Range    Urine Culture >100,000 Escherichia coli (A)        Susceptibility    Escherichia coli - MICROSCAN     Ampicillin   Susceptible ug/ml     Cefazolin  Susceptible ug/ml     Cefazolin (uncomplicated UTIs only)  Susceptible ug/ml     Ciprofloxacin  Susceptible ug/ml     Gentamicin  Susceptible ug/ml     Nitrofurantoin  Susceptible ug/ml     Piperacillin/Tazobactam  Susceptible ug/ml     Trimethoprim/Sulfamethoxazole  Susceptible ug/ml   ECG 12 lead    Collection Time: 09/16/24  5:52 PM   Result Value Ref Range    Ventricular Rate 96 BPM    Atrial Rate 96 BPM    NJ Interval 138 ms    QRS Duration 84 ms    QT Interval 344 ms    QTC Calculation(Bazett) 434 ms    P Axis 70 degrees    R Axis 64 degrees    T Axis 57 degrees    QRS Count 15 beats    Q Onset 218 ms    P Onset 149 ms    P Offset 212 ms    T Offset 390 ms    QTC Fredericia 402 ms   Prepare RBC: 2 Units    Collection Time: 09/16/24  6:34 PM   Result Value Ref Range    PRODUCT CODE G3835T97     Unit Number [id number]     Unit ABO A     Unit RH POS     XM INTEP COMP     Dispense Status TR     Blood Expiration Date 10/10/2024 11:59:00 PM EDT     PRODUCT BLOOD TYPE 6200     UNIT VOLUME 350     PRODUCT CODE U8411R88     Unit Number [id number]     Unit ABO A     Unit RH POS     XM INTEP COMP     Dispense Status IS     Blood Expiration Date 10/9/2024 11:59:00 PM EDT     PRODUCT BLOOD TYPE 6200     UNIT VOLUME 350    Troponin, High Sensitivity, 1 Hour    Collection Time: 09/16/24  6:40 PM   Result Value Ref Range    Troponin I, High Sensitivity 22 (H) 0 - 13 ng/L   Type and screen    Collection Time: 09/16/24  6:40 PM   Result Value Ref Range    ABO TYPE A     Rh TYPE POS     ANTIBODY SCREEN NEG    ECG 12 lead    Collection Time: 09/16/24  6:51 PM   Result Value Ref Range    Ventricular Rate 111 BPM    Atrial Rate 111 BPM    NJ Interval 136 ms    QRS Duration 82 ms    QT Interval 332 ms    QTC Calculation(Bazett) 451 ms    P Axis 73 degrees    R Axis 67 degrees    T Axis 51 degrees    QRS Count 19 beats    Q Onset 218 ms    P Onset 150 ms    P Offset 203 ms    T Offset 384 ms     QTC Fredericia 407 ms   Blood Culture    Collection Time: 09/16/24  7:29 PM    Specimen: Peripheral Venipuncture; Blood culture   Result Value Ref Range    Blood Culture No growth at 2 days    Blood Culture    Collection Time: 09/16/24  7:29 PM    Specimen: Peripheral Venipuncture; Blood culture   Result Value Ref Range    Blood Culture No growth at 2 days    SST TOP    Collection Time: 09/16/24  7:29 PM   Result Value Ref Range    Extra Tube Hold for add-ons.    C-Reactive Protein    Collection Time: 09/17/24 12:21 AM   Result Value Ref Range    C-Reactive Protein 7.44 (H) <1.00 mg/dL   Sedimentation rate, automated    Collection Time: 09/17/24 12:21 AM   Result Value Ref Range    Sedimentation Rate 36 (H) 0 - 30 mm/h   Comprehensive metabolic panel    Collection Time: 09/17/24  5:19 AM   Result Value Ref Range    Glucose 92 74 - 99 mg/dL    Sodium 139 136 - 145 mmol/L    Potassium 4.6 3.5 - 5.3 mmol/L    Chloride 107 98 - 107 mmol/L    Bicarbonate 21 21 - 32 mmol/L    Anion Gap 16 10 - 20 mmol/L    Urea Nitrogen 33 (H) 6 - 23 mg/dL    Creatinine 1.93 (H) 0.50 - 1.05 mg/dL    eGFR 25 (L) >60 mL/min/1.73m*2    Calcium 8.4 (L) 8.6 - 10.3 mg/dL    Albumin 3.2 (L) 3.4 - 5.0 g/dL    Alkaline Phosphatase 94 33 - 136 U/L    Total Protein 7.3 6.4 - 8.2 g/dL    AST 15 9 - 39 U/L    Bilirubin, Total 0.3 0.0 - 1.2 mg/dL    ALT 11 7 - 45 U/L   CBC and Auto Differential    Collection Time: 09/17/24  5:19 AM   Result Value Ref Range    WBC 6.5 4.4 - 11.3 x10*3/uL    nRBC 0.0 0.0 - 0.0 /100 WBCs    RBC 2.20 (L) 4.00 - 5.20 x10*6/uL    Hemoglobin 7.1 (L) 12.0 - 16.0 g/dL    Hematocrit 21.9 (L) 36.0 - 46.0 %     80 - 100 fL    MCH 32.3 26.0 - 34.0 pg    MCHC 32.4 32.0 - 36.0 g/dL    RDW 17.6 (H) 11.5 - 14.5 %    Platelets 189 150 - 450 x10*3/uL    Neutrophils % 64.0 40.0 - 80.0 %    Immature Granulocytes %, Automated 2.5 (H) 0.0 - 0.9 %    Lymphocytes % 14.2 13.0 - 44.0 %    Monocytes % 15.7 2.0 - 10.0 %    Eosinophils %  3.1 0.0 - 6.0 %    Basophils % 0.5 0.0 - 2.0 %    Neutrophils Absolute 4.17 1.60 - 5.50 x10*3/uL    Immature Granulocytes Absolute, Automated 0.16 0.00 - 0.50 x10*3/uL    Lymphocytes Absolute 0.92 0.80 - 3.00 x10*3/uL    Monocytes Absolute 1.02 (H) 0.05 - 0.80 x10*3/uL    Eosinophils Absolute 0.20 0.00 - 0.40 x10*3/uL    Basophils Absolute 0.03 0.00 - 0.10 x10*3/uL   Magnesium    Collection Time: 09/17/24  5:19 AM   Result Value Ref Range    Magnesium 1.81 1.60 - 2.40 mg/dL   Procalcitonin    Collection Time: 09/17/24  5:19 AM   Result Value Ref Range    Procalcitonin 0.16 (H) <=0.07 ng/mL   SST TOP    Collection Time: 09/17/24  5:19 AM   Result Value Ref Range    Extra Tube Hold for add-ons.    TSH    Collection Time: 09/17/24  5:19 AM   Result Value Ref Range    Thyroid Stimulating Hormone 3.30 0.44 - 3.98 mIU/L   Iron and TIBC    Collection Time: 09/17/24  5:19 AM   Result Value Ref Range    Iron 29 (L) 35 - 150 ug/dL    UIBC 155 110 - 370 ug/dL    TIBC 184 (L) 240 - 445 ug/dL    % Saturation 16 (L) 25 - 45 %   [employer]/Lambda Free Light Chain, Serum    Collection Time: 09/17/24  4:50 PM   Result Value Ref Range    Ig Kappa Free Light Chain 90.06 (H) 0.33 - 1.94 mg/dL    Ig Lambda Free Light Chain 1.05 0.57 - 2.63 mg/dL    Kappa/Lambda Ratio 85.77 (H) 0.26 - 1.65   Parathyroid Hormone, Intact    Collection Time: 09/17/24  4:50 PM   Result Value Ref Range    Parathyroid Hormone, Intact 74.2 18.5 - 88.0 pg/mL   Protein, Urine Random    Collection Time: 09/17/24  5:44 PM   Result Value Ref Range    Total Protein, Urine Random 78 (H) 5 - 24 mg/dL    Creatinine, Urine Random 100.1 20.0 - 320.0 mg/dL    T. Protein/Creatinine Ratio 0.78 (H) 0.00 - 0.17 mg/mg Creat   SST TOP    Collection Time: 09/18/24  5:33 AM   Result Value Ref Range    Extra Tube Hold for add-ons.    Protein, Total    Collection Time: 09/18/24  5:34 AM   Result Value Ref Range    Total Protein 6.8 6.4 - 8.2 g/dL   Comprehensive metabolic panel     Collection Time: 09/18/24  5:34 AM   Result Value Ref Range    Glucose 99 74 - 99 mg/dL    Sodium 141 136 - 145 mmol/L    Potassium 4.7 3.5 - 5.3 mmol/L    Chloride 110 (H) 98 - 107 mmol/L    Bicarbonate 20 (L) 21 - 32 mmol/L    Anion Gap 16 10 - 20 mmol/L    Urea Nitrogen 39 (H) 6 - 23 mg/dL    Creatinine 1.95 (H) 0.50 - 1.05 mg/dL    eGFR 25 (L) >60 mL/min/1.73m*2    Calcium 8.4 (L) 8.6 - 10.3 mg/dL    Albumin 3.0 (L) 3.4 - 5.0 g/dL    Alkaline Phosphatase 86 33 - 136 U/L    Total Protein 6.8 6.4 - 8.2 g/dL    AST 13 9 - 39 U/L    Bilirubin, Total 0.2 0.0 - 1.2 mg/dL    ALT 10 7 - 45 U/L   CBC and Auto Differential    Collection Time: 09/18/24  5:34 AM   Result Value Ref Range    WBC 6.7 4.4 - 11.3 x10*3/uL    nRBC 0.0 0.0 - 0.0 /100 WBCs    RBC 2.05 (L) 4.00 - 5.20 x10*6/uL    Hemoglobin 6.6 (L) 12.0 - 16.0 g/dL    Hematocrit 21.1 (L) 36.0 - 46.0 %     (H) 80 - 100 fL    MCH 32.2 26.0 - 34.0 pg    MCHC 31.3 (L) 32.0 - 36.0 g/dL    RDW 17.7 (H) 11.5 - 14.5 %    Platelets 186 150 - 450 x10*3/uL    Neutrophils % 70.2 40.0 - 80.0 %    Immature Granulocytes %, Automated 2.2 (H) 0.0 - 0.9 %    Lymphocytes % 12.2 13.0 - 44.0 %    Monocytes % 11.2 2.0 - 10.0 %    Eosinophils % 3.9 0.0 - 6.0 %    Basophils % 0.3 0.0 - 2.0 %    Neutrophils Absolute 4.72 1.60 - 5.50 x10*3/uL    Immature Granulocytes Absolute, Automated 0.15 0.00 - 0.50 x10*3/uL    Lymphocytes Absolute 0.82 0.80 - 3.00 x10*3/uL    Monocytes Absolute 0.75 0.05 - 0.80 x10*3/uL    Eosinophils Absolute 0.26 0.00 - 0.40 x10*3/uL    Basophils Absolute 0.02 0.00 - 0.10 x10*3/uL   Magnesium    Collection Time: 09/18/24  5:34 AM   Result Value Ref Range    Magnesium 1.94 1.60 - 2.40 mg/dL   Prepare RBC: 1 Units    Collection Time: 09/18/24  7:09 AM   Result Value Ref Range    PRODUCT CODE [id number]     Unit Number [id number]     Unit ABO A     Unit RH POS     XM INTEP COMP     Dispense Status XM     Blood Expiration Date 10/10/2024 11:59:00 PM EDT      PRODUCT BLOOD TYPE 6200     UNIT VOLUME 350    SST TOP    Collection Time: 09/19/24  4:32 AM   Result Value Ref Range    Extra Tube Hold for add-ons.    CBC and Auto Differential    Collection Time: 09/19/24  4:38 AM   Result Value Ref Range    WBC 6.8 4.4 - 11.3 x10*3/uL    nRBC 0.0 0.0 - 0.0 /100 WBCs    RBC 2.58 (L) 4.00 - 5.20 x10*6/uL    Hemoglobin 8.2 (L) 12.0 - 16.0 g/dL    Hematocrit 25.5 (L) 36.0 - 46.0 %    MCV 99 80 - 100 fL    MCH 31.8 26.0 - 34.0 pg    MCHC 32.2 32.0 - 36.0 g/dL    RDW 18.4 (H) 11.5 - 14.5 %    Platelets 189 150 - 450 x10*3/uL    Neutrophils % 71.8 40.0 - 80.0 %    Immature Granulocytes %, Automated 3.1 (H) 0.0 - 0.9 %    Lymphocytes % 10.7 13.0 - 44.0 %    Monocytes % 10.7 2.0 - 10.0 %    Eosinophils % 3.4 0.0 - 6.0 %    Basophils % 0.3 0.0 - 2.0 %    Neutrophils Absolute 4.89 1.60 - 5.50 x10*3/uL    Immature Granulocytes Absolute, Automated 0.21 0.00 - 0.50 x10*3/uL    Lymphocytes Absolute 0.73 (L) 0.80 - 3.00 x10*3/uL    Monocytes Absolute 0.73 0.05 - 0.80 x10*3/uL    Eosinophils Absolute 0.23 0.00 - 0.40 x10*3/uL    Basophils Absolute 0.02 0.00 - 0.10 x10*3/uL   Comprehensive metabolic panel    Collection Time: 09/19/24  4:38 AM   Result Value Ref Range    Glucose 88 74 - 99 mg/dL    Sodium 142 136 - 145 mmol/L    Potassium 4.5 3.5 - 5.3 mmol/L    Chloride 110 (H) 98 - 107 mmol/L    Bicarbonate 21 21 - 32 mmol/L    Anion Gap 16 10 - 20 mmol/L    Urea Nitrogen 31 (H) 6 - 23 mg/dL    Creatinine 1.65 (H) 0.50 - 1.05 mg/dL    eGFR 30 (L) >60 mL/min/1.73m*2    Calcium 8.8 8.6 - 10.3 mg/dL    Albumin 3.0 (L) 3.4 - 5.0 g/dL    Alkaline Phosphatase 83 33 - 136 U/L    Total Protein 7.0 6.4 - 8.2 g/dL    AST 12 9 - 39 U/L    Bilirubin, Total 0.3 0.0 - 1.2 mg/dL    ALT 9 7 - 45 U/L   Magnesium    Collection Time: 09/19/24  4:38 AM   Result Value Ref Range    Magnesium 1.89 1.60 - 2.40 mg/dL          Assessment:    This is an 89 yo Male with a PMH of CKD, HTN, HLD, TAVR, cachexia, former  tobacco use, and Afib (not on AC) who presented to [facility] on 9/16/24 with reports of SOB and dry cough x 3 days.  GI was consulted for blood in stool.  Denies blood in stool. Declines interventions despite me explaining that she could have cancer or a GIB.  +DIEUDONNE.         Plan  1.)  Blood in stool-Trend Hgb, transfuse as necessary, avoid NSAIDs.  No overt GI bleeding.  Patient declines any interventions given her cardiac history and advanced age as stated above.  Agree with iron transfusion.  GI will sign off for now as patient is declining any endoscopic evaluation.  Feel free to call with any questions or concerns.       Discussed patient with Dr. Cabrera.       I spent 30 minutes in the professional and overall care of this patient.      09/19/24 at 4:02 PM - DEBORAH Yadav-CNP

## 2024-09-19 NOTE — PROGRESS NOTES
Renal Progress Note  Assessment/  89 y.o. yo female admitted with sob and anemia.  Has underlying ckd stage 4.  Has been abnormal for some time at least since 2019.  Risk factors of HTN, Hyperlipidemia and TAVR.  Has 1+ proteinuria.  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  Normal EF.  Slight hypoalbuminemia.  Anemic with low iron levels        Plan/  Pt doesn't want extensive work up and refused GI eval per their note  If patient is being discharged could follow up as outpt  Repeat ultrasound right kidney 9.4 left kidney 10 right kidney moderate hydronephrosis left kidney as well no nephrolithiasis with the impression of moderate bilateral hydronephrosis  Patient will benefit from urology consult  IV iron and retacrit for anemia  Check spep/free light chains did show iron level corrected calcium to albumin 9.2  Quantify proteinuria showed 0.7 g proteinuria  Normal PTH  Will add sodium bicarb  Will hold Epogen  No change to her outpt meds right now from my standpoint.    May consider hematology oncology and urology consult   outpatient follow up from renal standpoint: Dr. Major         Subjective:   Admit Date: 9/16/2024    Interval History: Patient seen and examined uneventful night sitting in the chair in no apparent pain or distress denied any uremic related or fluid volume overload related symptoms expressed food taste good and she did eat      Medications:   Scheduled Meds:[Held by provider] aspirin, 81 mg, oral, Daily  atorvastatin, 20 mg, oral, Nightly  cefTRIAXone, 1 g, intravenous, q24h  donepezil, 5 mg, oral, Nightly  fluticasone furoate-vilanteroL, 1 puff, inhalation, Daily  [Held by provider] heparin (porcine), 5,000 Units, subcutaneous, q8h LEONARD  montelukast, 10 mg, oral, Daily  pantoprazole, 40 mg, oral, Daily   Or  pantoprazole, 40 mg, intravenous, Daily  sodium bicarbonate, 650 mg, oral, 4x daily      Continuous Infusions:     CBC:   Lab Results   Component Value Date    HGB 8.2 (L) 09/19/2024  "   HGB 6.6 (L) 09/18/2024    WBC 6.8 09/19/2024    WBC 6.7 09/18/2024     09/19/2024     09/18/2024      Anemia:    Lab Results   Component Value Date    IRON 29 (L) 09/17/2024    TIBC 184 (L) 09/17/2024      BMP:    Lab Results   Component Value Date     09/19/2024     09/18/2024    K 4.5 09/19/2024    K 4.7 09/18/2024     (H) 09/19/2024     (H) 09/18/2024    CO2 21 09/19/2024    CO2 20 (L) 09/18/2024    BUN 31 (H) 09/19/2024    BUN 39 (H) 09/18/2024    CREATININE 1.65 (H) 09/19/2024    CREATININE 1.95 (H) 09/18/2024      Bone disease:   Lab Results   Component Value Date    PTH 74.2 09/17/2024      Urinalysis:    Lab Results   Component Value Date    NENA 5.0 09/16/2024    PROTUR 30 (1+) (A) 09/16/2024    GLUCOSEU Normal 09/16/2024    BLOODU 0.06 (1+) (A) 09/16/2024    KETONESU NEGATIVE 09/16/2024    BILIRUBINU NEGATIVE 09/16/2024    NITRITEU NEGATIVE 09/16/2024    LEUKOCYTESU 500 Philippe/µL (A) 09/16/2024    UTPCR 0.78 (H) 09/17/2024        Objective:   Vitals: /64   Pulse 102   Temp 37.2 °C (99 °F)   Resp 18   Ht 1.702 m (5' 7\")   Wt 51.3 kg (113 lb)   SpO2 97%   BMI 17.70 kg/m²    Wt Readings from Last 3 Encounters:   09/16/24 51.3 kg (113 lb)   07/01/24 49.9 kg (110 lb)   03/29/24 51.3 kg (113 lb 3.2 oz)      24HR INTAKE/OUTPUT:    Intake/Output Summary (Last 24 hours) at 9/19/2024 1348  Last data filed at 9/19/2024 0200  Gross per 24 hour   Intake 60 ml   Output 750 ml   Net -690 ml     Admission weight:  Weight: 51.3 kg (113 lb)      Constitutional:  Alert, awake, no apparent distress   Skin:normal, no rash  HEENT:sclera anicteric.  Head atraumatic normocephalic  Neck:supple with no thyromegally  Cardiovascular:  S1, S2 without m/r/g   Respiratory:  CTA B without w/r/r   Abdomen: +bs, soft, nt  Ext: no LE edema  Musculoskeletal:Intact  Neuro:Alert and oriented with no deficit      Electronically signed by Gera Soler MD on 9/19/2024 at 1:48 PM            "

## 2024-09-19 NOTE — DOCUMENTATION CLARIFICATION NOTE
"    PATIENT:               ELÍAS GHOSH  ACCT #:                  [id number]  MRN:                       [id number]  :                       1935  ADMIT DATE:       2024 4:57 PM  DISCH DATE:  RESPONDING PROVIDER #:        [id number]          PROVIDER RESPONSE TEXT:    Sepsis with renal organ dysfunction of MAKEDA    CDI QUERY TEXT:    Clarification        Instruction:  Based on your assessment of the patient and the clinical information, please provide the requested documentation by clicking on the appropriate radio button and enter any additional information if prompted.    Question: Is there a diagnosis indicative of a patient meeting SIRS criteria and with organ dysfunction in the setting of UTI and pneumonia infection    When answering this query, please exercise your independent professional judgment. The fact that a question is being asked, does not imply that any particular answer is desired or expected.    The patient's clinical indicators include:  Clinical Information: 89 yof admit with UTI, pneumonia, MAKEDA meeting SIRS criteria    Clinical Indicators:    Pulse: 91/96/93/111/100...    Respirations: //// Urology: \" Bilateral hydronephrosis, acute kidney injury-Suspect secondary to patient's known pelvic organ prolapse\"     Internal Medicine: \"Acute community-acquired pneumonia, Acute UTI, uncomplicated\"    Treatment:  Rocephin 2g IV once, -current Rocephin 1g IV q24h, Azithromycin 500mg IV once, labs, VS, CXR, UA with culture    Risk Factors: 89 yof with infection with CKD, HTN, HLD, TAVR, atrial fibrillation  Options provided:  -- Sepsis with renal organ dysfunction of MAKEDA  -- Other - I will add my own diagnosis  -- Refer to Clinical Documentation Reviewer    Query created by: Jackie Cotto on 2024 10:16 AM      Electronically signed by:  JOVANI BEST PA-C 2024 10:28 AM          "

## 2024-09-19 NOTE — PROGRESS NOTES
"Daily Progress Note    Bora Moreno is a 89 y.o. female on day 3 of admission presenting with Pneumonia due to infectious organism, unspecified laterality, unspecified part of lung.    Subjective   Patient seen sitting comfortably in bedside chair. Denies CP, SOB, abd pain, n/v/d, HA, dizziness. Continues to refuse GI interventions despite anemia. Nephrology following for DIEUDONNE. Urology will plan for follow up outpatient.       Objective   Physical Exam  Constitutional:       Appearance: She is ill-appearing.   HENT:      Head: Normocephalic.      Mouth/Throat:      Mouth: Mucous membranes are dry.   Eyes:      Pupils: Pupils are equal, round, and reactive to light.   Cardiovascular:      Rate and Rhythm: Normal rate and regular rhythm.      Heart sounds: Normal heart sounds, S1 normal and S2 normal.   Pulmonary:      Effort: Pulmonary effort is normal.      Breath sounds: Normal breath sounds.   Abdominal:      General: Bowel sounds are normal.      Palpations: Abdomen is soft.      Tenderness: There is no abdominal tenderness.   Musculoskeletal:         General: Normal range of motion.      Cervical back: Neck supple.      Right lower leg: No edema.      Left lower leg: No edema.   Skin:     General: Skin is warm.   Neurological:      Mental Status: She is alert and oriented to person, place, and time.   Psychiatric:         Mood and Affect: Mood normal.         Behavior: Behavior normal.         Last Recorded Vitals  Blood pressure 138/64, pulse 102, temperature 37.2 °C (99 °F), resp. rate 18, height 1.702 m (5' 7\"), weight 51.3 kg (113 lb), SpO2 97%.  Intake/Output last 3 Shifts:  I/O last 3 completed shifts:  In: 811.7 (15.8 mL/kg) [P.O.:480; Blood:331.7]  Out: 750 (14.6 mL/kg) [Urine:750 (0.4 mL/kg/hr)]  Weight: 51.3 kg     Medications  Scheduled medications  [Held by provider] aspirin, 81 mg, oral, Daily  atorvastatin, 20 mg, oral, Nightly  cefTRIAXone, 1 g, intravenous, q24h  donepezil, 5 mg, oral, " Nightly  fluticasone furoate-vilanteroL, 1 puff, inhalation, Daily  [Held by provider] heparin (porcine), 5,000 Units, subcutaneous, q8h LEONARD  montelukast, 10 mg, oral, Daily  pantoprazole, 40 mg, oral, Daily   Or  pantoprazole, 40 mg, intravenous, Daily  sodium bicarbonate, 650 mg, oral, 4x daily      Continuous medications     PRN medications  PRN medications: acetaminophen **OR** acetaminophen **OR** acetaminophen, melatonin, ondansetron **OR** ondansetron    Labs  CBC:   Results from last 7 days   Lab Units 09/19/24 0438 09/18/24  0534 09/17/24  0519   WBC AUTO x10*3/uL 6.8 6.7 6.5   RBC AUTO x10*6/uL 2.58* 2.05* 2.20*   HEMOGLOBIN g/dL 8.2* 6.6* 7.1*   HEMATOCRIT % 25.5* 21.1* 21.9*   MCV fL 99 103* 100   MCH pg 31.8 32.2 32.3   MCHC g/dL 32.2 31.3* 32.4   RDW % 18.4* 17.7* 17.6*   PLATELETS AUTO x10*3/uL 189 186 189     CMP:    Results from last 7 days   Lab Units 09/19/24 0438 09/18/24  0534 09/17/24  0519   SODIUM mmol/L 142 141 139   POTASSIUM mmol/L 4.5 4.7 4.6   CHLORIDE mmol/L 110* 110* 107   CO2 mmol/L 21 20* 21   BUN mg/dL 31* 39* 33*   CREATININE mg/dL 1.65* 1.95* 1.93*   GLUCOSE mg/dL 88 99 92   PROTEIN TOTAL g/dL 7.0 6.8  6.8 7.3   CALCIUM mg/dL 8.8 8.4* 8.4*   BILIRUBIN TOTAL mg/dL 0.3 0.2 0.3   ALK PHOS U/L 83 86 94   AST U/L 12 13 15   ALT U/L 9 10 11     BMP:    Results from last 7 days   Lab Units 09/19/24 0438 09/18/24  0534 09/17/24  0519   SODIUM mmol/L 142 141 139   POTASSIUM mmol/L 4.5 4.7 4.6   CHLORIDE mmol/L 110* 110* 107   CO2 mmol/L 21 20* 21   BUN mg/dL 31* 39* 33*   CREATININE mg/dL 1.65* 1.95* 1.93*   CALCIUM mg/dL 8.8 8.4* 8.4*   GLUCOSE mg/dL 88 99 92     Magnesium:  Results from last 7 days   Lab Units 09/19/24  0438 09/18/24  0534 09/17/24  0519   MAGNESIUM mg/dL 1.89 1.94 1.81     Troponin:    Results from last 7 days   Lab Units 09/16/24  1840 09/16/24  1736   TROPHS ng/L 22* 21*     BNP:   Results from last 7 days   Lab Units 09/16/24  1736   BNP pg/mL 182*     Lipid  "Panel:         Nutrition             Relevant Results  Results from last 7 days   Lab Units 09/19/24  0438 09/18/24  0534 09/17/24  0519 09/16/24  1736   GLUCOSE mg/dL 88 99 92 109*     No results found for: \"HGBA1C\"     Assessment/Plan    Acute community-acquired pneumonia  -CXR with airspace disease in middle right lung, consider atelectasis vs pneumonia or collapse  -Finish IV azithromycin  -Will defer antibiotics after completion of azithromycin due to lack of symptoms, no fever, no leukocytosis  -Pro-Magdaleno 0.16  -Elevated ESR and CRP  -Follow blood cultures, no growth to date  -Obtain sputum culture     Symptomatic anemia  Iron deficiency anemia  -Consider iron deficiency anemia versus CKD versus acute GI bleed  -Hemoglobin was 5.9 on admission, 3 units PRBCs transfused so far  -Trend H&H, transfuse for hemoglobin less than 7  -Iron studies show low TIBC, low iron, low percent saturation  -GI consult, trend hemoglobin, monitor for overt bleeding, transfuse as necessary, no acute intervention  -Nephrology consulted, continue IV iron and Retacrit, add sodium bicarb, hold Epogen  -Renal ultrasound shows moderate bilateral hydronephrosis  -Patient is elevated free light chains and urine  -Spep pending  -Consider heme onc consult     Acute UTI, uncomplicated  -Continue IV Rocephin  -UA with over 50 WBCs, 4+ bacteria, 500 leukocyte esterase  -Urine culture showing enteric bacilli  -Follow susceptibility  -Cr back to baseline today  -urology consult, follow up outpatient, no acute interventions     Hypothyroidism  -Continue home Synthroid  -TSH 3.30    -TCC for discharge planning  -Will consider adding palliative med consult after patient has discussion with      DVTp: None for now, increased risk of bleeding  PLAN: Home with healthy at home    Jerica Pino PA-C    Plan of care was discussed extensively with patient.  Patient verbalized understanding through teach back method.  All question and concerns " addressed upon examination.    Of note, this documentation is completed using the Dragon Dictation system (voice recognition software). There may be spelling and/or grammatical errors that were not corrected prior to final submission.

## 2024-09-19 NOTE — CARE PLAN
Problem: Safety - Adult  Goal: Free from fall injury  Outcome: Met     Problem: Fall/Injury  Goal: Not fall by end of shift  Outcome: Met   The patient's goals for the shift include      The clinical goals for the shift include safefty

## 2024-09-20 VITALS
BODY MASS INDEX: 17.74 KG/M2 | HEIGHT: 67 IN | SYSTOLIC BLOOD PRESSURE: 177 MMHG | TEMPERATURE: 98.4 F | DIASTOLIC BLOOD PRESSURE: 75 MMHG | WEIGHT: 113 LBS | OXYGEN SATURATION: 96 % | RESPIRATION RATE: 17 BRPM | HEART RATE: 90 BPM

## 2024-09-20 LAB
ALBUMIN SERPL BCP-MCNC: 3.1 G/DL (ref 3.4–5)
ALP SERPL-CCNC: 93 U/L (ref 33–136)
ALT SERPL W P-5'-P-CCNC: 10 U/L (ref 7–45)
ANION GAP SERPL CALC-SCNC: 13 MMOL/L (ref 10–20)
AST SERPL W P-5'-P-CCNC: 13 U/L (ref 9–39)
ATRIAL RATE: 111 BPM
ATRIAL RATE: 96 BPM
BASOPHILS # BLD AUTO: 0.02 X10*3/UL (ref 0–0.1)
BASOPHILS NFR BLD AUTO: 0.3 %
BILIRUB SERPL-MCNC: 0.3 MG/DL (ref 0–1.2)
BLOOD EXPIRATION DATE: NORMAL
BUN SERPL-MCNC: 31 MG/DL (ref 6–23)
CALCIUM SERPL-MCNC: 8.7 MG/DL (ref 8.6–10.3)
CHLORIDE SERPL-SCNC: 110 MMOL/L (ref 98–107)
CO2 SERPL-SCNC: 24 MMOL/L (ref 21–32)
CREAT SERPL-MCNC: 1.67 MG/DL (ref 0.5–1.05)
DISPENSE STATUS: NORMAL
EGFRCR SERPLBLD CKD-EPI 2021: 30 ML/MIN/1.73M*2
EOSINOPHIL # BLD AUTO: 0.17 X10*3/UL (ref 0–0.4)
EOSINOPHIL NFR BLD AUTO: 2.5 %
ERYTHROCYTE [DISTWIDTH] IN BLOOD BY AUTOMATED COUNT: 17.6 % (ref 11.5–14.5)
GLUCOSE SERPL-MCNC: 100 MG/DL (ref 74–99)
HCT VFR BLD AUTO: 25.5 % (ref 36–46)
HGB BLD-MCNC: 8.2 G/DL (ref 12–16)
HOLD SPECIMEN: NORMAL
IMM GRANULOCYTES # BLD AUTO: 0.23 X10*3/UL (ref 0–0.5)
IMM GRANULOCYTES NFR BLD AUTO: 3.3 % (ref 0–0.9)
LYMPHOCYTES # BLD AUTO: 0.72 X10*3/UL (ref 0.8–3)
LYMPHOCYTES NFR BLD AUTO: 10.4 %
MAGNESIUM SERPL-MCNC: 1.88 MG/DL (ref 1.6–2.4)
MCH RBC QN AUTO: 31.8 PG (ref 26–34)
MCHC RBC AUTO-ENTMCNC: 32.2 G/DL (ref 32–36)
MCV RBC AUTO: 99 FL (ref 80–100)
MONOCYTES # BLD AUTO: 0.76 X10*3/UL (ref 0.05–0.8)
MONOCYTES NFR BLD AUTO: 11 %
NEUTROPHILS # BLD AUTO: 5.01 X10*3/UL (ref 1.6–5.5)
NEUTROPHILS NFR BLD AUTO: 72.5 %
NRBC BLD-RTO: 0 /100 WBCS (ref 0–0)
P AXIS: 70 DEGREES
P AXIS: 73 DEGREES
P OFFSET: 203 MS
P OFFSET: 212 MS
P ONSET: 149 MS
P ONSET: 150 MS
PLATELET # BLD AUTO: 191 X10*3/UL (ref 150–450)
POTASSIUM SERPL-SCNC: 4.3 MMOL/L (ref 3.5–5.3)
PR INTERVAL: 136 MS
PR INTERVAL: 138 MS
PRODUCT BLOOD TYPE: 6200
PRODUCT CODE: NORMAL
PROT SERPL-MCNC: 7 G/DL (ref 6.4–8.2)
Q ONSET: 218 MS
Q ONSET: 218 MS
QRS COUNT: 15 BEATS
QRS COUNT: 19 BEATS
QRS DURATION: 82 MS
QRS DURATION: 84 MS
QT INTERVAL: 332 MS
QT INTERVAL: 344 MS
QTC CALCULATION(BAZETT): 434 MS
QTC CALCULATION(BAZETT): 451 MS
QTC FREDERICIA: 402 MS
QTC FREDERICIA: 407 MS
R AXIS: 64 DEGREES
R AXIS: 67 DEGREES
RBC # BLD AUTO: 2.58 X10*6/UL (ref 4–5.2)
SODIUM SERPL-SCNC: 143 MMOL/L (ref 136–145)
T AXIS: 51 DEGREES
T AXIS: 57 DEGREES
T OFFSET: 384 MS
T OFFSET: 390 MS
UNIT ABO: NORMAL
UNIT NUMBER: NORMAL
UNIT RH: NORMAL
UNIT VOLUME: 350
VENTRICULAR RATE: 111 BPM
VENTRICULAR RATE: 96 BPM
WBC # BLD AUTO: 6.9 X10*3/UL (ref 4.4–11.3)
XM INTEP: NORMAL

## 2024-09-20 PROCEDURE — 85025 COMPLETE CBC W/AUTO DIFF WBC: CPT

## 2024-09-20 PROCEDURE — 2500000001 HC RX 250 WO HCPCS SELF ADMINISTERED DRUGS (ALT 637 FOR MEDICARE OP)

## 2024-09-20 PROCEDURE — 99239 HOSP IP/OBS DSCHRG MGMT >30: CPT

## 2024-09-20 PROCEDURE — 2500000001 HC RX 250 WO HCPCS SELF ADMINISTERED DRUGS (ALT 637 FOR MEDICARE OP): Performed by: NURSE PRACTITIONER

## 2024-09-20 PROCEDURE — 2500000004 HC RX 250 GENERAL PHARMACY W/ HCPCS (ALT 636 FOR OP/ED): Performed by: INTERNAL MEDICINE

## 2024-09-20 PROCEDURE — 36415 COLL VENOUS BLD VENIPUNCTURE: CPT

## 2024-09-20 PROCEDURE — 83735 ASSAY OF MAGNESIUM: CPT

## 2024-09-20 PROCEDURE — 80053 COMPREHEN METABOLIC PANEL: CPT

## 2024-09-20 RX ORDER — SULFAMETHOXAZOLE AND TRIMETHOPRIM 800; 160 MG/1; MG/1
1 TABLET ORAL 2 TIMES DAILY
Qty: 8 TABLET | Refills: 0 | Status: SHIPPED | OUTPATIENT
Start: 2024-09-20 | End: 2024-09-24

## 2024-09-20 RX ORDER — AMLODIPINE BESYLATE 5 MG/1
5 TABLET ORAL DAILY
Status: DISCONTINUED | OUTPATIENT
Start: 2024-09-21 | End: 2024-09-20 | Stop reason: HOSPADM

## 2024-09-20 RX ORDER — AMLODIPINE BESYLATE 5 MG/1
5 TABLET ORAL ONCE
Status: COMPLETED | OUTPATIENT
Start: 2024-09-20 | End: 2024-09-20

## 2024-09-20 ASSESSMENT — PAIN SCALES - GENERAL: PAINLEVEL_OUTOF10: 0 - NO PAIN

## 2024-09-20 ASSESSMENT — COGNITIVE AND FUNCTIONAL STATUS - GENERAL
DAILY ACTIVITIY SCORE: 24
MOBILITY SCORE: 21
MOVING TO AND FROM BED TO CHAIR: A LITTLE
WALKING IN HOSPITAL ROOM: A LITTLE
STANDING UP FROM CHAIR USING ARMS: A LITTLE

## 2024-09-20 NOTE — PROGRESS NOTES
"Renal Progress Note    Assessment and Plan:   89 y.o. yo female admitted with sob and anemia.  Has underlying ckd stage 4.  Has been abnormal for some time at least since 2019.  Risk factors of HTN, Hyperlipidemia and TAVR.  Has 1+ proteinuria.  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  Normal EF.  Slight hypoalbuminemia.  Anemic with low iron levels.  Ultrasound here with b/l hydronephrosis.  Urology consulted.  Gfr stable.  0.8g proteinuria        Plan/  Pt doesn't want extensive work up and refused GI eval per their note  If patient is being discharged could follow up as outpt  IV iron and retacrit for anemia - feraheme  and retacrit given  Cristhian light chains high.  Spep pending  Po hco3 - can stop as level up to 24 and limiting meds  Bp running high.  Add norvasc  Pt wants to follow up just as needed.  If Dr. Jones sees any worsening kidney function can refer back  Outpatient follow up from renal standpoint: Dr. Major    Subjective:   Admit Date: 9/16/2024    Interval History: pt doing ok.  No complaints.  Urology note noted      Medications:   Scheduled Meds:[Held by provider] aspirin, 81 mg, oral, Daily  atorvastatin, 20 mg, oral, Nightly  cefTRIAXone, 1 g, intravenous, q24h  donepezil, 5 mg, oral, Nightly  fluticasone furoate-vilanteroL, 1 puff, inhalation, Daily  [Held by provider] heparin (porcine), 5,000 Units, subcutaneous, q8h LEONARD  montelukast, 10 mg, oral, Daily  pantoprazole, 40 mg, oral, Daily   Or  pantoprazole, 40 mg, intravenous, Daily  sodium bicarbonate, 650 mg, oral, 4x daily      Continuous Infusions:     CBC:   Lab Results   Component Value Date    HGB 8.2 (L) 09/20/2024    HGB 8.2 (L) 09/19/2024    WBC 6.9 09/20/2024    WBC 6.8 09/19/2024     09/20/2024     09/19/2024      Anemia:  No results found for: \"FERRITIN\", \"IRON\", \"TIBC\"   BMP:    Lab Results   Component Value Date     09/20/2024     09/19/2024    K 4.3 09/20/2024    K 4.5 09/19/2024     (H) " "09/20/2024     (H) 09/19/2024    CO2 24 09/20/2024    CO2 21 09/19/2024    BUN 31 (H) 09/20/2024    BUN 31 (H) 09/19/2024    CREATININE 1.67 (H) 09/20/2024    CREATININE 1.65 (H) 09/19/2024      Bone disease:   Lab Results   Component Value Date    PTH 74.2 09/17/2024      Urinalysis:    Lab Results   Component Value Date    UTPCR 0.78 (H) 09/17/2024        Objective:   Vitals: /75   Pulse 90   Temp 36.9 °C (98.4 °F) (Temporal)   Resp 17   Ht 1.702 m (5' 7\")   Wt 51.3 kg (113 lb)   SpO2 96%   BMI 17.70 kg/m²    Wt Readings from Last 3 Encounters:   09/16/24 51.3 kg (113 lb)   07/01/24 49.9 kg (110 lb)   03/29/24 51.3 kg (113 lb 3.2 oz)      24HR INTAKE/OUTPUT:    Intake/Output Summary (Last 24 hours) at 9/20/2024 1149  Last data filed at 9/20/2024 0859  Gross per 24 hour   Intake 350 ml   Output --   Net 350 ml     Admission weight:  Weight: 51.3 kg (113 lb)      Constitutional:  Alert, awake, no apparent distress   Skin:normal, no rash  HEENT:sclera anicteric.  Head atraumatic normocephalic  Neck:supple with no thyromegally  Cardiovascular:  S1, S2 without m/r/g   Respiratory:  CTA B without w/r/r   Abdomen: +bs, soft, nt  Ext: no LE edema  Musculoskeletal:Intact  Neuro:Alert and oriented with no deficit      Electronically signed by Cameron Major MD on 9/20/2024 at 11:49 AM            "

## 2024-09-20 NOTE — CARE PLAN
The patient's goals for the shift include      The clinical goals for the shift include maintain safety    Over the shift, the patient did   Problem: Pain - Adult  Goal: Verbalizes/displays adequate comfort level or baseline comfort level  Outcome: Progressing     Problem: Discharge Planning  Goal: Discharge to home or other facility with appropriate resources  Outcome: Progressing     Problem: Chronic Conditions and Co-morbidities  Goal: Patient's chronic conditions and co-morbidity symptoms are monitored and maintained or improved  Outcome: Progressing     Problem: Fall/Injury  Goal: Be free from injury by end of the shift  Outcome: Progressing  Goal: Verbalize understanding of personal risk factors for fall in the hospital  Outcome: Progressing  Goal: Verbalize understanding of risk factor reduction measures to prevent injury from fall in the home  Outcome: Progressing  Goal: Use assistive devices by end of the shift  Outcome: Progressing  Goal: Pace activities to prevent fatigue by end of the shift  Outcome: Progressing     Problem: Skin  Goal: Participates in plan/prevention/treatment measures  Outcome: Progressing  Goal: Prevent/manage excess moisture  Outcome: Progressing  Goal: Prevent/minimize sheer/friction injuries  Outcome: Progressing  Goal: Promote/optimize nutrition  Outcome: Progressing  Goal: Promote skin healing  Outcome: Progressing   make progress toward the following goals.

## 2024-09-20 NOTE — DISCHARGE SUMMARY
Discharge Diagnosis  Pneumonia due to infectious organism, unspecified laterality, unspecified part of lung    Issues Requiring Follow-Up  Community-acquired pneumonia  Iron deficiency anemia  Urinary tract infection    Discharge Meds     Medication List      START taking these medications     sulfamethoxazole-trimethoprim 800-160 mg tablet; Commonly known as:   Bactrim DS; Take 1 tablet by mouth 2 times a day for 4 days.     CONTINUE taking these medications     aspirin 81 mg EC tablet   atorvastatin 20 mg tablet; Commonly known as: Lipitor; Take 1 tablet (20   mg) by mouth once daily at bedtime.   Breo Ellipta 200-25 mcg/dose inhaler; Generic drug: fluticasone   furoate-vilanteroL; USE 1 INHALATION DAILY   montelukast 10 mg tablet; Commonly known as: Singulair; TAKE 1 TABLET   DAILY   valsartan-hydrochlorothiazide 80-12.5 mg tablet; Commonly known as:   Diovan-HCT; TAKE ONE-HALF (1/2) TABLET DAILY     STOP taking these medications     donepezil 5 mg tablet; Commonly known as: Aricept   furosemide 20 mg tablet; Commonly known as: Lasix       Test Results Pending At Discharge  Pending Labs       Order Current Status    Serum Protein Electrophoresis + Immunofixation In process    Blood Culture Preliminary result    Blood Culture Preliminary result            Hospital Course   This is send 89-year-old female with past medical history of CKD, hypertension, hyperlipidemia, TAVR, cachexia, A-fib who presented on 9/16 for shortness of breath and dry cough secondary to community-acquired pneumonia.  CXR showed new airspace disease in the medial right lung however patient remained afebrile and labs without leukocytosis during admission.  She was initially started on azithromycin and Rocephin, however antibiotics were stopped due to lack of symptoms and physical exam findings.  Blood cultures pending.  While in the hospital patient also had symptomatic anemia, likely secondary to iron deficiency anemia.  Hemoglobin was 5.9  on admission, total of 3 units PRBCs transfused throughout stay.  GI consulted, however patient declined all interventions discussed. An outpatient referral has been placed so she may follow up as needed. Nephrology consulted for CKD and anemia, IV iron and retacrit given with improvement in hemoglobin. Patient should follow up with nephrology as needed per patient's request. Heme-onc referral was placed by nephrology. Urology consulted for UTI and bilateral hydronephrosis, recommend follow up outpatient to discuss further interventions. She was placed on IV antibiotics, urine culture shows growth of enteric bacilli. A prescription for Bactrim x4 days has been sent to pharmacy. She should resume all other home meds.     A discussion with the patient and family was had regarding palliative medicine consult. At this time, patient and  are interested in speaking with PCP prior to agreeing to a discussion with palliative medicine. Referral for outpatient palliative has been placed. Patient should call to schedule a hospital follow up visit with her PCP within 1 week. In addition, she should discuss risk vs benefit of taking aspirin with her severe anemia prior to restarting, as well as BP management. Patient will be discharged home today with healthy at home, referral placed. Patient is hemodynamically stable at discharge.    Plan of care was discussed extensively with patient.  Patient verbalized understanding through teach back method.  All question and concerns addressed upon examination.     Of note, this documentation is completed using the Dragon Dictation system (voice recognition software). There may be spelling and/or grammatical errors that were not corrected prior to final submission.      Pertinent Physical Exam At Time of Discharge  Physical Exam  Constitutional:       Appearance: Normal appearance.   HENT:      Head: Normocephalic.      Mouth/Throat:      Mouth: Mucous membranes are moist.   Eyes:       Pupils: Pupils are equal, round, and reactive to light.   Cardiovascular:      Rate and Rhythm: Normal rate and regular rhythm.      Heart sounds: Normal heart sounds, S1 normal and S2 normal.   Pulmonary:      Effort: Pulmonary effort is normal.      Breath sounds: Normal breath sounds.   Abdominal:      General: Bowel sounds are normal.      Palpations: Abdomen is soft.   Musculoskeletal:         General: Normal range of motion.      Cervical back: Neck supple.      Right lower leg: No edema.      Left lower leg: No edema.   Skin:     General: Skin is warm.      Findings: Bruising present.   Neurological:      Mental Status: She is alert and oriented to person, place, and time.   Psychiatric:         Mood and Affect: Mood normal.         Behavior: Behavior normal.         Outpatient Follow-Up  Future Appointments   Date Time Provider Department Center   10/1/2024 10:15 AM Jermain Jones MD [id number] [place]   2/12/2025 12:00 PM YOUNG [id number] ECHO/VASC 3 [id number] [place] [facility]   2/19/2025 11:00 AM Karan Ashton DO [id number] [place]         Jerica Pino PA-C  I spent 35 minutes on discharge. Time calculated includes bedside evaluation, counseling, and outpatient care coordination.

## 2024-09-21 LAB
BACTERIA BLD CULT: NORMAL
BACTERIA BLD CULT: NORMAL

## 2024-09-24 ENCOUNTER — PATIENT OUTREACH (OUTPATIENT)
Dept: HOME HEALTH SERVICES | Age: 89
End: 2024-09-24
Payer: MEDICARE

## 2024-09-24 ENCOUNTER — PATIENT OUTREACH (OUTPATIENT)
Dept: PRIMARY CARE | Facility: CLINIC | Age: 89
End: 2024-09-24
Payer: MEDICARE

## 2024-09-24 NOTE — PROGRESS NOTES
Discharge Facility:  [facility]  Discharge Diagnosis:  Pneumonia due to infectious organism, unspecified lateralit   Admission Date:  9/17/24  Discharge Date:  9/20/24    PCP Appointment Date:  10/1/24   Specialist Appointment Date:  urology, Dr. Silvestre  Referrals have been placed for palliative medicine, GI, and heme-onc. Call to schedule appointments when needed.   Hospital Encounter and Summary Linked: Yes/    Issues Requiring Follow-Up  Community-acquired pneumonia  Iron deficiency anemia  Urinary tract infection       Medications  Medications reviewed with patient/caregiver?: Yes (CM SPOKE WITH SPOUSE) (9/24/2024 10:10 AM)  Is the patient having any side effects they believe may be caused by any medication additions or changes?: No (9/24/2024 10:10 AM)  Does the patient have all medications ordered at discharge?: Yes (9/24/2024 10:10 AM)  Prescription Comments: New scripts given at discharge : BACTRIM- STOP ARICEPT, AND LASIX (9/24/2024 10:10 AM)  Is the patient taking all medications as directed (includes completed medication regime)?: Yes (9/24/2024 10:10 AM)  Care Management Interventions: Provided patient education (9/24/2024 10:10 AM)  Medication Comments: Pt denies problems obtaining or affording medication. No medication-related issues identified (9/24/2024 10:10 AM)    Appointments  Does the patient have a primary care provider?: Yes (9/24/2024 10:10 AM)  Care Management Interventions: Verified appointment date/time/provider (9/24/2024 10:10 AM)  Has the patient kept scheduled appointments due by today?: Yes (9/24/2024 10:10 AM)  Care Management Interventions: Advised patient to keep appointment (9/24/2024 10:10 AM)    Self Management  What is the home health agency?: DENIES NEED- PT REFERED TO [facility]. (9/24/2024 10:10 AM)  What Durable Medical Equipment (DME) was ordered?: N/A (9/24/2024 10:10 AM)    Patient Teaching  Does the patient have access to their discharge  instructions?: Yes (9/24/2024 10:10 AM)  Care Management Interventions: Reviewed instructions with patient (9/24/2024 10:10 AM)  What is the patient's perception of their health status since discharge?: Improving (9/24/2024 10:10 AM)  Is the patient/caregiver able to teach back the hierarchy of who to call/visit for symptoms/problems? PCP, Specialist, Home Health nurse, Urgent Care, ED, 911: Yes (9/24/2024 10:10 AM)  Patient/Caregiver Education Comments: This CM spoke with pts spouse via phone. Spouse reports pt doing well at home since discharge. New meds  and stoppped meds reviewed. Spouse reports they have picked up all new meds and have no questions at this time. Spouse aware of my availability for non-emergent concerns. Contact info provided. (9/24/2024 10:10 AM)

## 2024-09-25 ENCOUNTER — APPOINTMENT (OUTPATIENT)
Dept: HEMATOLOGY/ONCOLOGY | Facility: CLINIC | Age: 89
End: 2024-09-25
Payer: MEDICARE

## 2024-09-25 ENCOUNTER — DOCUMENTATION (OUTPATIENT)
Dept: HEMATOLOGY/ONCOLOGY | Facility: HOSPITAL | Age: 89
End: 2024-09-25
Payer: MEDICARE

## 2024-09-25 NOTE — PROGRESS NOTES
Scheduling offered patient an appointment for today. Patient's  declined and said that he wanted to talk to the PCP first. Scheduling then scheduled patient on 10/30 with Dr. Langston at [place]. Patient's  still might cancel this. Patient will talk to PCP on 10/1.

## 2024-09-25 NOTE — PROGRESS NOTES
Subjective   Reason for Visit: Bora Moreno is an 89 y.o. female here for a Medicare Wellness visit.  And transition of care visit    Past Medical, Surgical, and Family History reviewed and updated in chart.    Reviewed all medications by prescribing practitioner or clinical pharmacist (such as prescriptions, OTCs, herbal therapies and supplements) and documented in the medical record.  Patient here for annual Medicare wellness    Also transition of care visit    Recently hospitalized with pneumonia  Still with cough  Would like chest x-ray    A-fib and coronary disease stable keep cardiac follow-up  No angina noted    Hypertension stable  Tolerates medicine well    Osteoarthritis stable multiple joints    Dementia stable  No progression or worsening    Patient here today with spouse  Coronary Artery Disease  Pertinent negatives include no chest pain, chest tightness, dizziness, leg swelling or shortness of breath.   Cough recent pneumonia    Patient Care Team:  Jermain Jones MD as PCP - General  Jermain Jones MD as PCP [place] [facility] [place]  Karan Ashton DO as Cardiologist (Cardiology)  Nighat Romo RN as Care Manager (Case Management)     Review of Systems   Constitutional:  Negative for activity change and fatigue.   HENT:  Negative for congestion and sore throat.    Eyes:  Negative for discharge.   Respiratory:  Positive for cough. Negative for chest tightness and shortness of breath.    Cardiovascular:  Negative for chest pain and leg swelling.   Gastrointestinal:  Negative for abdominal pain, blood in stool, constipation, diarrhea, nausea and vomiting.   Endocrine: Negative for cold intolerance and heat intolerance.   Genitourinary:  Negative for difficulty urinating and hematuria.   Musculoskeletal:  Negative for arthralgias, back pain, gait problem, myalgias and neck pain.   Allergic/Immunologic: Negative for environmental allergies.   Neurological:  Negative for dizziness,  "syncope, weakness, numbness and headaches.   Hematological:  Negative for adenopathy. Does not bruise/bleed easily.   Psychiatric/Behavioral:  Negative for dysphoric mood. The patient is not nervous/anxious.    All other systems reviewed and are negative.      Objective   Vitals:  /67 (BP Location: Right arm, BP Cuff Size: Adult)   Pulse 85   Ht 1.702 m (5' 7\")   Wt 48.3 kg (106 lb 6.4 oz)   SpO2 97%   BMI 16.66 kg/m²       Physical Exam  Vitals and nursing note reviewed.   Constitutional:       General: She is not in acute distress.     Appearance: Normal appearance.   HENT:      Head: Normocephalic and atraumatic.      Right Ear: Tympanic membrane, ear canal and external ear normal.      Left Ear: Tympanic membrane, ear canal and external ear normal.      Nose: Nose normal.      Mouth/Throat:      Mouth: Mucous membranes are moist.      Pharynx: Oropharynx is clear. No oropharyngeal exudate or posterior oropharyngeal erythema.   Eyes:      Extraocular Movements: Extraocular movements intact.      Conjunctiva/sclera: Conjunctivae normal.      Pupils: Pupils are equal, round, and reactive to light.   Cardiovascular:      Rate and Rhythm: Normal rate and regular rhythm.      Pulses: Normal pulses.      Heart sounds: Normal heart sounds. No murmur heard.  Pulmonary:      Effort: Pulmonary effort is normal. No respiratory distress.      Breath sounds: Normal breath sounds. No wheezing or rales.   Abdominal:      General: Abdomen is flat. Bowel sounds are normal. There is no distension.      Palpations: Abdomen is soft. There is no mass.      Tenderness: There is no abdominal tenderness.   Musculoskeletal:         General: No swelling or deformity. Normal range of motion.      Cervical back: Normal range of motion and neck supple.      Right lower leg: No edema.      Left lower leg: No edema.   Lymphadenopathy:      Cervical: No cervical adenopathy.   Skin:     General: Skin is warm and dry.      Capillary " Refill: Capillary refill takes less than 2 seconds.      Findings: No lesion or rash.   Neurological:      General: No focal deficit present.      Mental Status: She is alert and oriented to person, place, and time.      Cranial Nerves: No cranial nerve deficit.      Motor: No weakness.   Psychiatric:         Mood and Affect: Mood normal.         Behavior: Behavior normal.         Thought Content: Thought content normal.         Judgment: Judgment normal.         Assessment & Plan  Routine general medical examination at health care facility    Orders:    1 Year Follow Up In [facility] - PCP - Wellness Exam; Future    Atrial fibrillation, unspecified type (Multi)          Coronary artery disease involving native coronary artery of native heart without angina pectoris         Essential hypertension         Primary osteoarthritis involving multiple joints         Moderate dementia without behavioral disturbance, psychotic disturbance, mood disturbance, or anxiety, unspecified dementia type         Acute cough    Orders:    XR chest 2 views; Future    Follow Up In [facility] - PCP; Future    ACP reviewed    Patient education provided.  Stay current with age appropriate health maintenance as instructed.  Appointment here or ER with new or worsening symptoms'  Keep appropriate follow-up visit.  Stay current with proper immunizations    Recheck 3 months  Discussed with patient and spouse  Orders as above

## 2024-09-30 DIAGNOSIS — J45.30 MILD PERSISTENT ASTHMA WITHOUT COMPLICATION (HHS-HCC): Primary | ICD-10-CM

## 2024-10-01 ENCOUNTER — HOSPITAL ENCOUNTER (OUTPATIENT)
Dept: RADIOLOGY | Facility: HOSPITAL | Age: 89
Discharge: HOME | End: 2024-10-01
Payer: MEDICARE

## 2024-10-01 ENCOUNTER — APPOINTMENT (OUTPATIENT)
Dept: PRIMARY CARE | Facility: CLINIC | Age: 89
End: 2024-10-01
Payer: MEDICARE

## 2024-10-01 VITALS
HEART RATE: 85 BPM | WEIGHT: 106.4 LBS | DIASTOLIC BLOOD PRESSURE: 67 MMHG | SYSTOLIC BLOOD PRESSURE: 145 MMHG | HEIGHT: 67 IN | BODY MASS INDEX: 16.7 KG/M2 | OXYGEN SATURATION: 97 %

## 2024-10-01 DIAGNOSIS — F03.B0 MODERATE DEMENTIA WITHOUT BEHAVIORAL DISTURBANCE, PSYCHOTIC DISTURBANCE, MOOD DISTURBANCE, OR ANXIETY, UNSPECIFIED DEMENTIA TYPE: ICD-10-CM

## 2024-10-01 DIAGNOSIS — R05.1 ACUTE COUGH: ICD-10-CM

## 2024-10-01 DIAGNOSIS — Z00.00 ROUTINE GENERAL MEDICAL EXAMINATION AT HEALTH CARE FACILITY: Primary | ICD-10-CM

## 2024-10-01 DIAGNOSIS — I25.10 CORONARY ARTERY DISEASE INVOLVING NATIVE CORONARY ARTERY OF NATIVE HEART WITHOUT ANGINA PECTORIS: ICD-10-CM

## 2024-10-01 DIAGNOSIS — I48.91 ATRIAL FIBRILLATION, UNSPECIFIED TYPE (MULTI): ICD-10-CM

## 2024-10-01 DIAGNOSIS — M15.0 PRIMARY OSTEOARTHRITIS INVOLVING MULTIPLE JOINTS: ICD-10-CM

## 2024-10-01 DIAGNOSIS — I10 ESSENTIAL HYPERTENSION: ICD-10-CM

## 2024-10-01 PROBLEM — D53.9 ANEMIA ASSOCIATED WITH NUTRITIONAL DEFICIENCY: Status: ACTIVE | Noted: 2024-10-01

## 2024-10-01 PROCEDURE — 71046 X-RAY EXAM CHEST 2 VIEWS: CPT

## 2024-10-01 PROCEDURE — 1170F FXNL STATUS ASSESSED: CPT | Performed by: FAMILY MEDICINE

## 2024-10-01 PROCEDURE — 1123F ACP DISCUSS/DSCN MKR DOCD: CPT | Performed by: FAMILY MEDICINE

## 2024-10-01 PROCEDURE — 1158F ADVNC CARE PLAN TLK DOCD: CPT | Performed by: FAMILY MEDICINE

## 2024-10-01 PROCEDURE — 1160F RVW MEDS BY RX/DR IN RCRD: CPT | Performed by: FAMILY MEDICINE

## 2024-10-01 PROCEDURE — 1111F DSCHRG MED/CURRENT MED MERGE: CPT | Performed by: FAMILY MEDICINE

## 2024-10-01 PROCEDURE — 99496 TRANSJ CARE MGMT HIGH F2F 7D: CPT | Performed by: FAMILY MEDICINE

## 2024-10-01 PROCEDURE — 71046 X-RAY EXAM CHEST 2 VIEWS: CPT | Performed by: RADIOLOGY

## 2024-10-01 PROCEDURE — G0439 PPPS, SUBSEQ VISIT: HCPCS | Performed by: FAMILY MEDICINE

## 2024-10-01 PROCEDURE — 3077F SYST BP >= 140 MM HG: CPT | Performed by: FAMILY MEDICINE

## 2024-10-01 PROCEDURE — 3078F DIAST BP <80 MM HG: CPT | Performed by: FAMILY MEDICINE

## 2024-10-01 PROCEDURE — 1159F MED LIST DOCD IN RCRD: CPT | Performed by: FAMILY MEDICINE

## 2024-10-01 PROCEDURE — 1036F TOBACCO NON-USER: CPT | Performed by: FAMILY MEDICINE

## 2024-10-01 ASSESSMENT — ENCOUNTER SYMPTOMS
HEADACHES: 0
NAUSEA: 0
CONSTIPATION: 0
LOSS OF SENSATION IN FEET: 0
OCCASIONAL FEELINGS OF UNSTEADINESS: 0
SHORTNESS OF BREATH: 0
CHEST TIGHTNESS: 0
DYSPHORIC MOOD: 0
DIZZINESS: 0
SORE THROAT: 0
HEMATURIA: 0
ACTIVITY CHANGE: 0
DEPRESSION: 0
FATIGUE: 0
COUGH: 1
EYE DISCHARGE: 0
BACK PAIN: 0
NECK PAIN: 0
BRUISES/BLEEDS EASILY: 0
BLOOD IN STOOL: 0
NERVOUS/ANXIOUS: 0
ADENOPATHY: 0
DIFFICULTY URINATING: 0
MYALGIAS: 0
VOMITING: 0
WEAKNESS: 0
DIARRHEA: 0
ABDOMINAL PAIN: 0
ARTHRALGIAS: 0
NUMBNESS: 0

## 2024-10-01 ASSESSMENT — ACTIVITIES OF DAILY LIVING (ADL)
GROCERY_SHOPPING: INDEPENDENT
MANAGING_FINANCES: INDEPENDENT
DOING_HOUSEWORK: INDEPENDENT
TAKING_MEDICATION: INDEPENDENT
DRESSING: INDEPENDENT
BATHING: INDEPENDENT

## 2024-10-01 ASSESSMENT — PATIENT HEALTH QUESTIONNAIRE - PHQ9
1. LITTLE INTEREST OR PLEASURE IN DOING THINGS: NOT AT ALL
2. FEELING DOWN, DEPRESSED OR HOPELESS: NOT AT ALL
SUM OF ALL RESPONSES TO PHQ9 QUESTIONS 1 AND 2: 0

## 2024-10-04 DIAGNOSIS — J01.40 ACUTE NON-RECURRENT PANSINUSITIS: Primary | ICD-10-CM

## 2024-10-04 RX ORDER — AZITHROMYCIN 500 MG/1
500 TABLET, FILM COATED ORAL DAILY
Qty: 5 TABLET | Refills: 0 | Status: SHIPPED | OUTPATIENT
Start: 2024-10-04 | End: 2024-10-09

## 2024-10-04 RX ORDER — BENZONATATE 100 MG/1
100 CAPSULE ORAL 3 TIMES DAILY PRN
Qty: 90 CAPSULE | Refills: 1 | Status: SHIPPED | OUTPATIENT
Start: 2024-10-04 | End: 2024-12-03

## 2024-10-04 NOTE — TELEPHONE ENCOUNTER
----- Message from Jermain Jones sent at 10/4/2024  2:55 PM EDT -----  Recommend Z-Adriano as directed and Tessalon 100 mg 3 times daily as needed #30 with 1 refill  Report persistent or worsening cough  ----- Message -----  From: Neha Boucher MA  Sent: 10/4/2024   2:54 PM EDT  To: Jermain Jones MD    Patient still concerned about the cough and would like to know about medication to help    Results were successfully communicated with the patient and they acknowledged their understanding.

## 2024-10-09 ENCOUNTER — PATIENT OUTREACH (OUTPATIENT)
Dept: PRIMARY CARE | Facility: CLINIC | Age: 89
End: 2024-10-09
Payer: MEDICARE

## 2024-10-09 NOTE — PROGRESS NOTES
Call regarding appt. with PCP on 10/1/24 after hospitalization.  At time of outreach call the patient feels as if their condition has (improved since last visit. No fevers. Eating and drinking with difficulty  no other concerns at this time.  Contact info provided.

## 2024-10-15 DIAGNOSIS — J45.30 MILD PERSISTENT ASTHMA WITHOUT COMPLICATION (HHS-HCC): ICD-10-CM

## 2024-10-15 RX ORDER — MONTELUKAST SODIUM 10 MG/1
10 TABLET ORAL DAILY
Qty: 90 TABLET | Refills: 3 | Status: SHIPPED | OUTPATIENT
Start: 2024-10-15

## 2024-10-30 ENCOUNTER — APPOINTMENT (OUTPATIENT)
Dept: HEMATOLOGY/ONCOLOGY | Facility: CLINIC | Age: 89
End: 2024-10-30
Payer: MEDICARE

## 2024-11-12 ENCOUNTER — PATIENT OUTREACH (OUTPATIENT)
Dept: PRIMARY CARE | Facility: CLINIC | Age: 89
End: 2024-11-12
Payer: MEDICARE

## 2024-11-12 NOTE — PROGRESS NOTES
Call regarding appt. with PCP on 10/1/24 after hospitalization.  At time of outreach call the patient feels as if their condition has (improved) since last visit.  Reviewed the PCP appointment with the pt and addressed any questions or concerns.  Contact info provided.

## 2024-12-12 ENCOUNTER — PATIENT OUTREACH (OUTPATIENT)
Dept: PRIMARY CARE | Facility: CLINIC | Age: 89
End: 2024-12-12
Payer: MEDICARE

## 2024-12-12 NOTE — PROGRESS NOTES
Final call.  CM called and spoke with Patient to address any final questions or concerns regarding hospitalization.  Pt reports doing well and has no concerns at this time.  Pt given my contact info  in the event questions should arise.

## 2024-12-26 NOTE — PROGRESS NOTES
"Subjective   Patient ID: Bora Moreno is a 89 y.o. female who presents for Follow-up.  HPI  Dementia ongoing  Discussed issues with spouse  Will offer neuro referral or adjustment of medicines they would like to add Namenda to her Aricept    A-fib and coronary disease stable keep cardiac follow-up    Hypertension stable no chest pain or shortness of breath    Heart valve issues ongoing keep cardiac follow-up    Osteoarthritis stable multiple joints    Encourage weight gain discussed nutrition issues  Review of Systems   Constitutional:  Negative for activity change and fatigue.   HENT:  Negative for congestion and sore throat.    Eyes:  Negative for discharge.   Respiratory:  Negative for cough, chest tightness and shortness of breath.    Cardiovascular:  Negative for chest pain and leg swelling.   Gastrointestinal:  Negative for abdominal pain, blood in stool, constipation, diarrhea, nausea and vomiting.   Endocrine: Negative for cold intolerance and heat intolerance.   Genitourinary:  Negative for difficulty urinating and hematuria.   Musculoskeletal:  Negative for arthralgias, back pain, gait problem, myalgias and neck pain.   Allergic/Immunologic: Negative for environmental allergies.   Neurological:  Negative for dizziness, syncope, weakness, numbness and headaches.   Hematological:  Negative for adenopathy. Does not bruise/bleed easily.   Psychiatric/Behavioral:  Negative for dysphoric mood. The patient is not nervous/anxious.    All other systems reviewed and are negative.      Objective   /61 (BP Location: Left arm, BP Cuff Size: Adult)   Pulse 88   Ht 1.702 m (5' 7\")   Wt 45.7 kg (100 lb 12.8 oz)   SpO2 98%   BMI 15.79 kg/m²    Physical Exam  Vitals and nursing note reviewed.   Constitutional:       General: She is not in acute distress.     Appearance: Normal appearance.   HENT:      Head: Normocephalic and atraumatic.      Right Ear: Tympanic membrane, ear canal and external ear normal.    "   Left Ear: Tympanic membrane, ear canal and external ear normal.      Nose: Nose normal.      Mouth/Throat:      Mouth: Mucous membranes are moist.      Pharynx: Oropharynx is clear. No oropharyngeal exudate or posterior oropharyngeal erythema.   Eyes:      Extraocular Movements: Extraocular movements intact.      Conjunctiva/sclera: Conjunctivae normal.      Pupils: Pupils are equal, round, and reactive to light.   Cardiovascular:      Rate and Rhythm: Normal rate and regular rhythm.      Pulses: Normal pulses.      Heart sounds: Normal heart sounds. No murmur heard.  Pulmonary:      Effort: Pulmonary effort is normal. No respiratory distress.      Breath sounds: Normal breath sounds. No wheezing or rales.   Abdominal:      General: Abdomen is flat. Bowel sounds are normal. There is no distension.      Palpations: Abdomen is soft. There is no mass.      Tenderness: There is no abdominal tenderness.   Musculoskeletal:         General: No swelling or deformity. Normal range of motion.      Cervical back: Normal range of motion and neck supple.      Right lower leg: No edema.      Left lower leg: No edema.   Lymphadenopathy:      Cervical: No cervical adenopathy.   Skin:     General: Skin is warm and dry.      Capillary Refill: Capillary refill takes less than 2 seconds.      Findings: No lesion or rash.   Neurological:      General: No focal deficit present.      Mental Status: She is alert and oriented to person, place, and time.      Cranial Nerves: No cranial nerve deficit.      Motor: No weakness.   Psychiatric:         Mood and Affect: Mood normal.         Behavior: Behavior normal.         Thought Content: Thought content normal.         Judgment: Judgment normal.         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 and albumin creatinine ratio  mg/g  (Multi)    Primary osteoarthritis involving multiple joints    Essential hypertension    Severe mitral regurgitation    Nonrheumatic aortic valve stenosis    Moderate dementia without behavioral disturbance, psychotic disturbance, mood disturbance, or anxiety, unspecified dementia type    Relevant Medications    donepezil (Aricept) 10 mg tablet    memantine (Namenda) 5 mg tablet    Atrial fibrillation, unspecified type (Multi)    Chronic obstructive pulmonary disease with (acute) lower respiratory infection (Multi)     Other Visit Diagnoses       Acute cough                Patient education provided.  Stay current with age appropriate health maintenance as instructed.  Appointment here or ER with new or worsening symptoms'  Keep appropriate follow-up visit.  Stay current with proper immunizations     Discussed with spouse  Meds as above recheck 3 months and as needed  Offer neuro follow-up they will consider

## 2025-01-03 ENCOUNTER — APPOINTMENT (OUTPATIENT)
Dept: PRIMARY CARE | Facility: CLINIC | Age: 89
End: 2025-01-03
Payer: MEDICARE

## 2025-01-03 VITALS
DIASTOLIC BLOOD PRESSURE: 61 MMHG | SYSTOLIC BLOOD PRESSURE: 127 MMHG | WEIGHT: 100.8 LBS | BODY MASS INDEX: 15.82 KG/M2 | HEIGHT: 67 IN | HEART RATE: 88 BPM | OXYGEN SATURATION: 98 %

## 2025-01-03 DIAGNOSIS — I35.0 NONRHEUMATIC AORTIC VALVE STENOSIS: ICD-10-CM

## 2025-01-03 DIAGNOSIS — I48.91 ATRIAL FIBRILLATION, UNSPECIFIED TYPE (MULTI): ICD-10-CM

## 2025-01-03 DIAGNOSIS — I34.0 SEVERE MITRAL REGURGITATION: ICD-10-CM

## 2025-01-03 DIAGNOSIS — F03.B0 MODERATE DEMENTIA WITHOUT BEHAVIORAL DISTURBANCE, PSYCHOTIC DISTURBANCE, MOOD DISTURBANCE, OR ANXIETY, UNSPECIFIED DEMENTIA TYPE: ICD-10-CM

## 2025-01-03 DIAGNOSIS — I10 ESSENTIAL HYPERTENSION: ICD-10-CM

## 2025-01-03 DIAGNOSIS — J44.0 CHRONIC OBSTRUCTIVE PULMONARY DISEASE WITH (ACUTE) LOWER RESPIRATORY INFECTION (MULTI): ICD-10-CM

## 2025-01-03 DIAGNOSIS — N18.32 CKD STAGE G3B/A2, GFR 30-44 AND ALBUMIN CREATININE RATIO 30-299 MG/G (MULTI): ICD-10-CM

## 2025-01-03 DIAGNOSIS — R05.1 ACUTE COUGH: ICD-10-CM

## 2025-01-03 DIAGNOSIS — I25.10 CORONARY ARTERY DISEASE INVOLVING NATIVE CORONARY ARTERY OF NATIVE HEART WITHOUT ANGINA PECTORIS: Primary | ICD-10-CM

## 2025-01-03 DIAGNOSIS — M15.0 PRIMARY OSTEOARTHRITIS INVOLVING MULTIPLE JOINTS: ICD-10-CM

## 2025-01-03 PROBLEM — D61.818 OTHER PANCYTOPENIA (MULTI): Status: RESOLVED | Noted: 2023-06-29 | Resolved: 2025-01-03

## 2025-01-03 PROCEDURE — 3078F DIAST BP <80 MM HG: CPT | Performed by: FAMILY MEDICINE

## 2025-01-03 PROCEDURE — 3074F SYST BP LT 130 MM HG: CPT | Performed by: FAMILY MEDICINE

## 2025-01-03 PROCEDURE — 1123F ACP DISCUSS/DSCN MKR DOCD: CPT | Performed by: FAMILY MEDICINE

## 2025-01-03 PROCEDURE — 99214 OFFICE O/P EST MOD 30 MIN: CPT | Performed by: FAMILY MEDICINE

## 2025-01-03 PROCEDURE — 1160F RVW MEDS BY RX/DR IN RCRD: CPT | Performed by: FAMILY MEDICINE

## 2025-01-03 PROCEDURE — 1036F TOBACCO NON-USER: CPT | Performed by: FAMILY MEDICINE

## 2025-01-03 PROCEDURE — 1159F MED LIST DOCD IN RCRD: CPT | Performed by: FAMILY MEDICINE

## 2025-01-03 RX ORDER — MEMANTINE HYDROCHLORIDE 5 MG/1
5 TABLET ORAL 2 TIMES DAILY
Qty: 60 TABLET | Refills: 11 | Status: SHIPPED | OUTPATIENT
Start: 2025-01-03 | End: 2026-01-03

## 2025-01-03 RX ORDER — DONEPEZIL HYDROCHLORIDE 10 MG/1
10 TABLET, FILM COATED ORAL NIGHTLY
Qty: 30 TABLET | Refills: 5 | Status: SHIPPED | OUTPATIENT
Start: 2025-01-03 | End: 2025-07-02

## 2025-01-03 ASSESSMENT — ENCOUNTER SYMPTOMS
CHEST TIGHTNESS: 0
BACK PAIN: 0
EYE DISCHARGE: 0
DIZZINESS: 0
NERVOUS/ANXIOUS: 0
CONSTIPATION: 0
HEMATURIA: 0
SORE THROAT: 0
DIFFICULTY URINATING: 0
ARTHRALGIAS: 0
COUGH: 0
BLOOD IN STOOL: 0
SHORTNESS OF BREATH: 0
ACTIVITY CHANGE: 0
VOMITING: 0
NUMBNESS: 0
BRUISES/BLEEDS EASILY: 0
MYALGIAS: 0
ABDOMINAL PAIN: 0
HEADACHES: 0
FATIGUE: 0
DYSPHORIC MOOD: 0
NECK PAIN: 0
ADENOPATHY: 0
DIARRHEA: 0
WEAKNESS: 0
NAUSEA: 0

## 2025-02-12 ENCOUNTER — HOSPITAL ENCOUNTER (OUTPATIENT)
Dept: CARDIOLOGY | Facility: CLINIC | Age: 89
Discharge: HOME | End: 2025-02-12
Payer: MEDICARE

## 2025-02-12 DIAGNOSIS — I34.0 SEVERE MITRAL REGURGITATION: ICD-10-CM

## 2025-02-12 DIAGNOSIS — I25.10 CORONARY ARTERY DISEASE INVOLVING NATIVE CORONARY ARTERY OF NATIVE HEART WITHOUT ANGINA PECTORIS: ICD-10-CM

## 2025-02-12 DIAGNOSIS — I35.0 NONRHEUMATIC AORTIC VALVE STENOSIS: ICD-10-CM

## 2025-02-12 LAB
ALBUMIN SERPL-MCNC: 3.5 G/DL (ref 3.6–5.1)
ALP SERPL-CCNC: 86 U/L (ref 37–153)
ALT SERPL-CCNC: 12 U/L (ref 6–29)
ANION GAP SERPL CALCULATED.4IONS-SCNC: 9 MMOL/L (CALC) (ref 7–17)
AORTIC VALVE MEAN GRADIENT: 16 MMHG
AORTIC VALVE PEAK VELOCITY: 2.67 M/S
AST SERPL-CCNC: 15 U/L (ref 10–35)
AV PEAK GRADIENT: 29 MMHG
AVA (PEAK VEL): 0.97 CM2
AVA (VTI): 1.02 CM2
BILIRUB SERPL-MCNC: 0.3 MG/DL (ref 0.2–1.2)
BUN SERPL-MCNC: 53 MG/DL (ref 7–25)
CALCIUM SERPL-MCNC: 8.7 MG/DL (ref 8.6–10.4)
CHLORIDE SERPL-SCNC: 108 MMOL/L (ref 98–110)
CHOLEST SERPL-MCNC: 105 MG/DL
CHOLEST/HDLC SERPL: 3.8 (CALC)
CO2 SERPL-SCNC: 21 MMOL/L (ref 20–32)
CREAT SERPL-MCNC: 2.44 MG/DL (ref 0.6–0.95)
EGFRCR SERPLBLD CKD-EPI 2021: 19 ML/MIN/1.73M2
EJECTION FRACTION APICAL 4 CHAMBER: 59.4
EJECTION FRACTION: 60 %
GLUCOSE SERPL-MCNC: 128 MG/DL (ref 65–99)
HDLC SERPL-MCNC: 28 MG/DL
LDLC SERPL CALC-MCNC: 55 MG/DL (CALC)
LEFT VENTRICLE INTERNAL DIMENSION DIASTOLE: 4.5 CM (ref 3.5–6)
LEFT VENTRICULAR OUTFLOW TRACT DIAMETER: 1.7 CM
LV EJECTION FRACTION BIPLANE: 59 %
MITRAL VALVE E/A RATIO: 0.82
MITRAL VALVE E/E' RATIO: 13.5
NONHDLC SERPL-MCNC: 77 MG/DL (CALC)
POTASSIUM SERPL-SCNC: 5.9 MMOL/L (ref 3.5–5.3)
PROT SERPL-MCNC: 8.2 G/DL (ref 6.1–8.1)
RIGHT VENTRICLE PEAK SYSTOLIC PRESSURE: 32.4 MMHG
SODIUM SERPL-SCNC: 138 MMOL/L (ref 135–146)
TRIGL SERPL-MCNC: 137 MG/DL

## 2025-02-12 PROCEDURE — 93306 TTE W/DOPPLER COMPLETE: CPT | Performed by: INTERNAL MEDICINE

## 2025-02-12 PROCEDURE — 93306 TTE W/DOPPLER COMPLETE: CPT

## 2025-02-19 ENCOUNTER — APPOINTMENT (OUTPATIENT)
Dept: CARDIOLOGY | Facility: CLINIC | Age: 89
End: 2025-02-19
Payer: MEDICARE

## 2025-02-19 VITALS
BODY MASS INDEX: 16.92 KG/M2 | HEART RATE: 100 BPM | SYSTOLIC BLOOD PRESSURE: 114 MMHG | DIASTOLIC BLOOD PRESSURE: 58 MMHG | HEIGHT: 64 IN | WEIGHT: 99.1 LBS

## 2025-02-19 DIAGNOSIS — I25.10 CORONARY ARTERY DISEASE INVOLVING NATIVE CORONARY ARTERY OF NATIVE HEART WITHOUT ANGINA PECTORIS: ICD-10-CM

## 2025-02-19 DIAGNOSIS — I48.91 ATRIAL FIBRILLATION, UNSPECIFIED TYPE (MULTI): ICD-10-CM

## 2025-02-19 DIAGNOSIS — I10 ESSENTIAL HYPERTENSION: ICD-10-CM

## 2025-02-19 DIAGNOSIS — E78.2 MIXED HYPERLIPIDEMIA: ICD-10-CM

## 2025-02-19 DIAGNOSIS — N18.2 STAGE 2 CHRONIC KIDNEY DISEASE: ICD-10-CM

## 2025-02-19 DIAGNOSIS — R42 DIZZINESS: ICD-10-CM

## 2025-02-19 DIAGNOSIS — Z87.891 FORMER SMOKER: ICD-10-CM

## 2025-02-19 DIAGNOSIS — Z95.2 S/P TAVR (TRANSCATHETER AORTIC VALVE REPLACEMENT): ICD-10-CM

## 2025-02-19 DIAGNOSIS — I73.9 PERIPHERAL VASCULAR DISEASE, UNSPECIFIED (CMS-HCC): ICD-10-CM

## 2025-02-19 PROCEDURE — 93000 ELECTROCARDIOGRAM COMPLETE: CPT | Performed by: INTERNAL MEDICINE

## 2025-02-19 PROCEDURE — 99214 OFFICE O/P EST MOD 30 MIN: CPT | Performed by: INTERNAL MEDICINE

## 2025-02-19 PROCEDURE — 3074F SYST BP LT 130 MM HG: CPT | Performed by: INTERNAL MEDICINE

## 2025-02-19 PROCEDURE — 3078F DIAST BP <80 MM HG: CPT | Performed by: INTERNAL MEDICINE

## 2025-02-19 PROCEDURE — 1123F ACP DISCUSS/DSCN MKR DOCD: CPT | Performed by: INTERNAL MEDICINE

## 2025-02-19 PROCEDURE — 1036F TOBACCO NON-USER: CPT | Performed by: INTERNAL MEDICINE

## 2025-02-19 PROCEDURE — 1159F MED LIST DOCD IN RCRD: CPT | Performed by: INTERNAL MEDICINE

## 2025-02-19 NOTE — PATIENT INSTRUCTIONS
Refer to Nephrology Dr Rahman  Carotid duplex one year  CMP and Lipid  1  year visit  Stop valsartan/hydrochlorothiazide  BP instructions  Patient educated on proper medication use.   Patient educated on risk factor modification.   Please bring any lab results from other providers / physicians to your next appointment.     Please bring all medicines, vitamins, and herbal supplements with you when you come to the office.     Prescriptions will not be filled unless you are compliant with your follow up appointments or have a follow up appointment scheduled as per instruction of your physician. Refills should be requested at the time of your visit.

## 2025-02-19 NOTE — PROGRESS NOTES
CARDIOLOGY OFFICE VISIT      CHIEF COMPLAINT  Chief Complaint   Patient presents with    Follow-up     1 year follow up and to review recent Echocardiogram results        HISTORY OF PRESENT ILLNESS  Patient is a 89-year-old  female with past medical history significant for aortic valve stenosis, s/p TAVR 21, hypertension, hyperlipidemia, hyperglycemia, asthma who presented to the hospital with a syncopal episode.     Patient reports having increased fatigue.  She gets dizziness and feels no near syncopal when she goes to the store.  Denies chest pain.  Chronic dyspnea on exertion relieved with using metered-dose inhaler.  Denies palpitations, nausea, vomiting, edema.  Patient currently drinks approximately 24-32 ounces of water daily.  No formal exercise program.        Past surgical history:  Hysterectomy  Bilateral cataract     Social history:  Quit smoking   Denies alcohol use     Family history:  Father  78 multiple strokes  Mother  86 old age     Review of systems have been reviewed and are negative, noncontributory, or as previously mentioned x12 systems.     I personally reviewed EKG 2021: Normal sinus rhythm, left atrial enlargement     Assessment:  Aortic valve stenosis s/p TAVR 21  Hypertension  Hyperlipidemia  Coronary artery disease-mild on cardiac cath 2021  Carotid artery stenosis  Mitral valve regurgitation  Tricuspid valve regurgitation  Anemia  Chronic renal insufficiency  Dementia        Recommendations:  Patient appears to be stable from a cardiac standpoint. No symptoms of angina. No symptomatic vascular disease.  Stable TAVR prosthesis on echocardiogram. No symptomatic vascular disease. No symptomatic arrhythmia.  Advise compression stockings knee-high 15-20 mmHg be worn daily.  Patient has a significant decline in renal function with creatinine 2.4, GFR 19.  Stop valsartan hydrochlorothiazide.  I encouraged her to increase her water  intake to 64 ounces per day.  Refer to nephrology.  Maintain blood pressure diary prior to office visits.  Follow-up with myself in 1 year.  In 1 year obtain carotid ultrasound, CMP, lipid profile.     Please excuse any errors in grammar or translation related to this dictation. Voice recognition software was utilized to prepare this document.     Recent Cardiovascular Testing:  The following results have been reviewed and updated.   Labs 2/8/25  ,HDL 28, LDL 55, Trig     Echo: 2/12/25  1. EF 60%  2. S/P TAVR peak gradient is 33 mmHg, mean gradient is 18 mmHg   3.Prosthetic aoartic valve  4.Mild MR ,TR  CRD: 2/9/24  1. Bilateral ICA 50-69%     Cardiac cath: 3/29/21  1. Mild CAD.  2. EF 60%.  3. Pulmonary arterial hypertension, mild.       VITALS  Vitals:    02/19/25 1057   BP: 114/58   Pulse: 100     Wt Readings from Last 4 Encounters:   01/03/25 45.7 kg (100 lb 12.8 oz)   10/01/24 48.3 kg (106 lb 6.4 oz)   09/16/24 51.3 kg (113 lb)   07/01/24 49.9 kg (110 lb)       PHYSICAL EXAM:  GENERAL:  Well developed, well nourished, in no acute distress.  CHEST:  Symmetric and nontender.  NEURO/PSYCH:  Alert and oriented times three with approppriate behavior and responses.  NECK:  Supple, no JVD, no bruit.  LUNGS:  Clear to auscultation bilaterally, normal respiratory effort.  HEART:  Rate and rhythm REGULAR with systolic ejection murmur left sternal border, no gallop appreciated.    There are no rubs, clicks or heaves.  EXTREMITIES:  Warm with good color, no clubbing or cyanosis.  There is no edema noted.  PERIPHERAL VASCULAR:  Pulses present and equally palpable; 2+ throughout.    ASSESSMENT AND PLAN  Problem List Items Addressed This Visit          Cardiac and Vasculature    Coronary artery disease involving native coronary artery of native heart without angina pectoris    Relevant Orders    Comprehensive metabolic panel    Essential hypertension    Mixed hyperlipidemia    Relevant Orders    Lipid panel    S/P TAVR  (transcatheter aortic valve replacement)    Peripheral vascular disease, unspecified (CMS-HCC)    Atrial fibrillation, unspecified type (Multi)    Relevant Orders    ECG 12 lead (Clinic Performed) (Completed)       Endocrine/Metabolic    Body mass index (BMI) less than 19       Tobacco    Former smoker     Other Visit Diagnoses       Dizziness        Relevant Orders    Vascular US carotid artery duplex bilateral    Stage 2 chronic kidney disease        Relevant Orders    Referral to Nephrology            Past Medical History  Past Medical History:   Diagnosis Date    COPD (chronic obstructive pulmonary disease) (Multi)     High blood cholesterol     Hypertension     Personal history of other diseases of the circulatory system 2022    History of aortic valve stenosis       Social History  Social History     Tobacco Use    Smoking status: Former     Current packs/day: 0.00     Types: Cigarettes     Start date:      Quit date:      Years since quittin.1     Passive exposure: Never    Smokeless tobacco: Never   Vaping Use    Vaping status: Never Used   Substance Use Topics    Alcohol use: Never    Drug use: Never       Family History     Family History   Problem Relation Name Age of Onset    Stroke Father      Other (cerebrovascular accident) Father          cerebrovascular accident (CVA)    Coronary artery disease Father          Allergies:  Allergies   Allergen Reactions    Captopril Unknown     rhinitis    Influenza Virus Vaccines Nausea And Vomiting        Outpatient Medications:  Current Outpatient Medications   Medication Instructions    aspirin 81 mg, Daily    atorvastatin (LIPITOR) 20 mg, oral, Nightly    donepezil (ARICEPT) 10 mg, oral, Nightly    fluticasone furoate-vilanteroL (Breo Ellipta) 200-25 mcg/dose inhaler inhalation, Daily    memantine (NAMENDA) 5 mg, oral, 2 times daily    montelukast (SINGULAIR) 10 mg, oral, Daily    valsartan-hydrochlorothiazide (Diovan-HCT) 80-12.5 mg tablet  "TAKE ONE-HALF (1/2) TABLET DAILY        Recent Lab Results:    CMP:    Lab Results   Component Value Date     02/12/2025    K 5.9 (H) 02/12/2025     02/12/2025    CO2 21 02/12/2025    BUN 53 (H) 02/12/2025    CREATININE 2.44 (H) 02/12/2025    GLUCOSE 128 (H) 02/12/2025    CALCIUM 8.7 02/12/2025       Magnesium:    Lab Results   Component Value Date    MG 1.88 09/20/2024       Lipid Profile:    Lab Results   Component Value Date    TRIG 137 02/12/2025    HDL 28 (L) 02/12/2025    LDLCALC 55 02/12/2025       TSH:    Lab Results   Component Value Date    TSH 3.30 09/17/2024       BNP:   Lab Results   Component Value Date     (H) 09/16/2024        PT/INR:    Lab Results   Component Value Date    PROTIME 11.6 07/30/2021    INR 1.0 07/30/2021       HgBA1c:    No results found for: \"HGBA1C\"    BMP:  Lab Results   Component Value Date     02/12/2025     09/20/2024     09/19/2024     09/18/2024    K 5.9 (H) 02/12/2025    K 4.3 09/20/2024    K 4.5 09/19/2024    K 4.7 09/18/2024     02/12/2025     (H) 09/20/2024     (H) 09/19/2024     (H) 09/18/2024    CO2 21 02/12/2025    CO2 24 09/20/2024    CO2 21 09/19/2024    CO2 20 (L) 09/18/2024    BUN 53 (H) 02/12/2025    BUN 31 (H) 09/20/2024    BUN 31 (H) 09/19/2024    BUN 39 (H) 09/18/2024    CREATININE 2.44 (H) 02/12/2025    CREATININE 1.67 (H) 09/20/2024    CREATININE 1.65 (H) 09/19/2024    CREATININE 1.95 (H) 09/18/2024       CBC:  Lab Results   Component Value Date    WBC 6.9 09/20/2024    WBC 6.8 09/19/2024    WBC 6.7 09/18/2024    RBC 2.58 (L) 09/20/2024    RBC 2.58 (L) 09/19/2024    RBC 2.05 (L) 09/18/2024    HGB 8.2 (L) 09/20/2024    HGB 8.2 (L) 09/19/2024    HGB 6.6 (L) 09/18/2024    HCT 25.5 (L) 09/20/2024    HCT 25.5 (L) 09/19/2024    HCT 21.1 (L) 09/18/2024    MCV 99 09/20/2024    MCV 99 09/19/2024     (H) 09/18/2024    MCH 31.8 09/20/2024    MCH 31.8 09/19/2024    MCH 32.2 09/18/2024    MCHC 32.2 " 09/20/2024    MCHC 32.2 09/19/2024    MCHC 31.3 (L) 09/18/2024    RDW 17.6 (H) 09/20/2024    RDW 18.4 (H) 09/19/2024    RDW 17.7 (H) 09/18/2024     09/20/2024     09/19/2024     09/18/2024       Cardiac Enzymes:    Lab Results   Component Value Date    TROPHS 22 (H) 09/16/2024    TROPHS 21 (H) 09/16/2024       Hepatic Function Panel:    Lab Results   Component Value Date    ALKPHOS 86 02/12/2025    ALT 12 02/12/2025    AST 15 02/12/2025    PROT 8.2 (H) 02/12/2025    BILITOT 0.3 02/12/2025           Karan Ashton DO

## 2025-03-24 ENCOUNTER — HOSPITAL ENCOUNTER (OUTPATIENT)
Dept: RADIOLOGY | Facility: HOSPITAL | Age: 89
Discharge: HOME | End: 2025-03-24
Payer: MEDICARE

## 2025-03-24 DIAGNOSIS — N13.30 UNSPECIFIED HYDRONEPHROSIS: ICD-10-CM

## 2025-03-24 PROCEDURE — 76770 US EXAM ABDO BACK WALL COMP: CPT

## 2025-03-24 PROCEDURE — 76770 US EXAM ABDO BACK WALL COMP: CPT | Performed by: STUDENT IN AN ORGANIZED HEALTH CARE EDUCATION/TRAINING PROGRAM

## 2025-04-03 ENCOUNTER — APPOINTMENT (OUTPATIENT)
Dept: PRIMARY CARE | Facility: CLINIC | Age: 89
End: 2025-04-03
Payer: MEDICARE

## 2025-04-09 NOTE — PROGRESS NOTES
"Subjective   Patient ID: Bora Moreno is a 89 y.o. female who presents for Leg Problem, Coronary Artery Disease, and Follow-up.  HPI    Left leg issue  Swollen ankles  Stay off amlodipine  Patient and spouse would like to resume Diovan/HCT  Watch renal function    Dementia ongoing  Keep neuro follow-up continue medicines    High cholesterol stable  Going blood work  Ongoing medicine    A-fib and coronary disease stable no chest pain or shortness of breath keep cardiac follow-up    Hypertension with good control  No chest pain or shortness of breath  Continue medicines    Chronic kidney disease stable  Lifestyle modification and risk factor modification    COPD ongoing  No progression or worsening    Osteoarthritis ongoing multiple joints    Patient here today with spouse    Review of Systems   Constitutional:  Negative for activity change and fatigue.   HENT:  Negative for congestion and sore throat.    Eyes:  Negative for discharge.   Respiratory:  Negative for cough, chest tightness and shortness of breath.    Cardiovascular:  Negative for chest pain and leg swelling.   Gastrointestinal:  Negative for abdominal pain, blood in stool, constipation, diarrhea, nausea and vomiting.   Endocrine: Negative for cold intolerance and heat intolerance.   Genitourinary:  Negative for difficulty urinating and hematuria.   Musculoskeletal:  Negative for arthralgias, back pain, gait problem, myalgias and neck pain.   Allergic/Immunologic: Negative for environmental allergies.   Neurological:  Negative for dizziness, syncope, weakness, numbness and headaches.   Hematological:  Negative for adenopathy. Does not bruise/bleed easily.   Psychiatric/Behavioral:  Negative for dysphoric mood. The patient is not nervous/anxious.    All other systems reviewed and are negative.      Objective   /66 (BP Location: Right arm, BP Cuff Size: Large adult)   Pulse 94   Ht 1.702 m (5' 7\")   Wt (!) 44.5 kg (98 lb 3.2 oz)   SpO2 97%   " BMI 15.38 kg/m²    Physical Exam  Vitals and nursing note reviewed.   Constitutional:       General: She is not in acute distress.     Appearance: Normal appearance.   HENT:      Head: Normocephalic and atraumatic.      Right Ear: Tympanic membrane, ear canal and external ear normal.      Left Ear: Tympanic membrane, ear canal and external ear normal.      Nose: Nose normal.      Mouth/Throat:      Mouth: Mucous membranes are moist.      Pharynx: Oropharynx is clear. No oropharyngeal exudate or posterior oropharyngeal erythema.   Eyes:      Extraocular Movements: Extraocular movements intact.      Conjunctiva/sclera: Conjunctivae normal.      Pupils: Pupils are equal, round, and reactive to light.   Cardiovascular:      Rate and Rhythm: Normal rate and regular rhythm.      Pulses: Normal pulses.      Heart sounds: Normal heart sounds. No murmur heard.  Pulmonary:      Effort: Pulmonary effort is normal. No respiratory distress.      Breath sounds: Normal breath sounds. No wheezing or rales.   Abdominal:      General: Abdomen is flat. Bowel sounds are normal. There is no distension.      Palpations: Abdomen is soft. There is no mass.      Tenderness: There is no abdominal tenderness.   Musculoskeletal:         General: No swelling or deformity. Normal range of motion.      Cervical back: Normal range of motion and neck supple.      Right lower leg: No edema.      Left lower leg: No edema.   Lymphadenopathy:      Cervical: No cervical adenopathy.   Skin:     General: Skin is warm and dry.      Capillary Refill: Capillary refill takes less than 2 seconds.      Findings: No lesion or rash.   Neurological:      General: No focal deficit present.      Mental Status: She is alert and oriented to person, place, and time.      Cranial Nerves: No cranial nerve deficit.      Motor: No weakness.   Psychiatric:         Mood and Affect: Mood normal.         Behavior: Behavior normal.         Thought Content: Thought content  normal.         Judgment: Judgment normal.         Assessment/Plan   Problem List Items Addressed This Visit       Coronary artery disease involving native coronary artery of native heart without angina pectoris    Primary osteoarthritis involving multiple joints    Essential hypertension    Relevant Medications    valsartan-hydrochlorothiazide (Diovan-HCT) 80-12.5 mg tablet    Other Relevant Orders    Referral to Clinical Pharmacy    Follow Up In [facility] - PCP    Mixed hyperlipidemia    Severe mitral regurgitation    Mild persistent asthma without complication ([facility])    Moderate dementia without behavioral disturbance, psychotic disturbance, mood disturbance, or anxiety, unspecified dementia type    Peripheral vascular disease, unspecified (CMS-HCC)    Atrial fibrillation, unspecified type (Multi) - Primary    Chronic obstructive pulmonary disease with (acute) lower respiratory infection    Chronic kidney disease, stage 4 (severe) (Multi)     Other Visit Diagnoses         Bilateral leg edema                Patient education provided.  Stay current with age appropriate health maintenance as instructed.  Appointment here or ER with new or worsening symptoms'  Keep appropriate follow-up visit.  Stay current with proper immunizations     Meds as above  Blood work as ordered and recheck in 3 months  Discussed with patient and spouse  Low-sodium diet

## 2025-04-14 DIAGNOSIS — J45.30 MILD PERSISTENT ASTHMA WITHOUT COMPLICATION (HHS-HCC): ICD-10-CM

## 2025-04-14 DIAGNOSIS — E78.2 MIXED HYPERLIPIDEMIA: ICD-10-CM

## 2025-04-14 RX ORDER — FLUTICASONE FUROATE AND VILANTEROL TRIFENATATE 200; 25 UG/1; UG/1
POWDER RESPIRATORY (INHALATION) DAILY
Qty: 2 EACH | Refills: 5 | Status: SHIPPED | OUTPATIENT
Start: 2025-04-14

## 2025-04-14 RX ORDER — ATORVASTATIN CALCIUM 20 MG/1
20 TABLET, FILM COATED ORAL NIGHTLY
Qty: 90 TABLET | Refills: 3 | Status: SHIPPED | OUTPATIENT
Start: 2025-04-14

## 2025-04-14 NOTE — TELEPHONE ENCOUNTER
Received request for prescription refills for patient.   Patient follows with Dr. Ashton    Request is for Lipitor  Is patient currently on medication yes    Last OV 2/19/2025  Next OV 2/18/2026    Pended for signing and sent to provider

## 2025-04-14 NOTE — TELEPHONE ENCOUNTER
Rx Refill Request     Name: Bora Moreno  :  1935   Medication Name:  fluticasone furoate-vilanteroL (Breo Ellipta) 200-25 mcg/dose inhaler   Specific Pharmacy location:  [facility]   Date of last appointment:  1/3/2025   Date of next appointment:  2025   Best number to reach patient:  [phone]

## 2025-05-02 ENCOUNTER — APPOINTMENT (OUTPATIENT)
Dept: PRIMARY CARE | Facility: CLINIC | Age: 89
End: 2025-05-02
Payer: MEDICARE

## 2025-05-02 VITALS
OXYGEN SATURATION: 97 % | WEIGHT: 98.2 LBS | BODY MASS INDEX: 15.41 KG/M2 | HEART RATE: 94 BPM | SYSTOLIC BLOOD PRESSURE: 126 MMHG | HEIGHT: 67 IN | DIASTOLIC BLOOD PRESSURE: 66 MMHG

## 2025-05-02 DIAGNOSIS — I34.0 SEVERE MITRAL REGURGITATION: ICD-10-CM

## 2025-05-02 DIAGNOSIS — I10 ESSENTIAL HYPERTENSION: ICD-10-CM

## 2025-05-02 DIAGNOSIS — I25.10 CORONARY ARTERY DISEASE INVOLVING NATIVE CORONARY ARTERY OF NATIVE HEART WITHOUT ANGINA PECTORIS: ICD-10-CM

## 2025-05-02 DIAGNOSIS — R60.0 BILATERAL LEG EDEMA: ICD-10-CM

## 2025-05-02 DIAGNOSIS — E78.2 MIXED HYPERLIPIDEMIA: ICD-10-CM

## 2025-05-02 DIAGNOSIS — F03.B0 MODERATE DEMENTIA WITHOUT BEHAVIORAL DISTURBANCE, PSYCHOTIC DISTURBANCE, MOOD DISTURBANCE, OR ANXIETY, UNSPECIFIED DEMENTIA TYPE: ICD-10-CM

## 2025-05-02 DIAGNOSIS — I48.91 ATRIAL FIBRILLATION, UNSPECIFIED TYPE (MULTI): Primary | ICD-10-CM

## 2025-05-02 DIAGNOSIS — M15.0 PRIMARY OSTEOARTHRITIS INVOLVING MULTIPLE JOINTS: ICD-10-CM

## 2025-05-02 DIAGNOSIS — J45.30 MILD PERSISTENT ASTHMA WITHOUT COMPLICATION (HHS-HCC): ICD-10-CM

## 2025-05-02 DIAGNOSIS — N18.4 CHRONIC KIDNEY DISEASE, STAGE 4 (SEVERE) (MULTI): ICD-10-CM

## 2025-05-02 DIAGNOSIS — I73.9 PERIPHERAL VASCULAR DISEASE, UNSPECIFIED (CMS-HCC): ICD-10-CM

## 2025-05-02 DIAGNOSIS — J44.0 CHRONIC OBSTRUCTIVE PULMONARY DISEASE WITH (ACUTE) LOWER RESPIRATORY INFECTION: ICD-10-CM

## 2025-05-02 PROCEDURE — 99214 OFFICE O/P EST MOD 30 MIN: CPT | Performed by: FAMILY MEDICINE

## 2025-05-02 PROCEDURE — 1159F MED LIST DOCD IN RCRD: CPT | Performed by: FAMILY MEDICINE

## 2025-05-02 PROCEDURE — 3078F DIAST BP <80 MM HG: CPT | Performed by: FAMILY MEDICINE

## 2025-05-02 PROCEDURE — 1123F ACP DISCUSS/DSCN MKR DOCD: CPT | Performed by: FAMILY MEDICINE

## 2025-05-02 PROCEDURE — 1160F RVW MEDS BY RX/DR IN RCRD: CPT | Performed by: FAMILY MEDICINE

## 2025-05-02 PROCEDURE — 1036F TOBACCO NON-USER: CPT | Performed by: FAMILY MEDICINE

## 2025-05-02 PROCEDURE — 3074F SYST BP LT 130 MM HG: CPT | Performed by: FAMILY MEDICINE

## 2025-05-02 RX ORDER — VALSARTAN AND HYDROCHLOROTHIAZIDE 80; 12.5 MG/1; MG/1
1 TABLET, FILM COATED ORAL DAILY
Qty: 30 TABLET | Refills: 11 | Status: SHIPPED | OUTPATIENT
Start: 2025-05-02 | End: 2026-05-02

## 2025-05-02 ASSESSMENT — ENCOUNTER SYMPTOMS
CONSTIPATION: 0
VOMITING: 0
CHEST TIGHTNESS: 0
DIARRHEA: 0
ACTIVITY CHANGE: 0
DYSPHORIC MOOD: 0
ARTHRALGIAS: 0
NECK PAIN: 0
NERVOUS/ANXIOUS: 0
ABDOMINAL PAIN: 0
ADENOPATHY: 0
BRUISES/BLEEDS EASILY: 0
NAUSEA: 0
DIZZINESS: 0
COUGH: 0
SORE THROAT: 0
FATIGUE: 0
BLOOD IN STOOL: 0
EYE DISCHARGE: 0
HEMATURIA: 0
NUMBNESS: 0
MYALGIAS: 0
HEADACHES: 0
DIFFICULTY URINATING: 0
BACK PAIN: 0
SHORTNESS OF BREATH: 0
WEAKNESS: 0

## 2025-05-05 ENCOUNTER — TELEPHONE (OUTPATIENT)
Dept: PRIMARY CARE | Facility: CLINIC | Age: 89
End: 2025-05-05
Payer: MEDICARE

## 2025-05-05 NOTE — TELEPHONE ENCOUNTER
Pt's  called in stating she was prescribed valsartan-hydrochlorothiazide at her las appointment. He wants to know if she is suppose to be taking amlodipine as well or if that medication was supposed to be stopped. Amlodipine is not listed in her current medication list.    Please advise.

## 2025-06-06 ENCOUNTER — HOSPITAL ENCOUNTER (EMERGENCY)
Facility: HOSPITAL | Age: 89
Discharge: HOME | DRG: 377 | End: 2025-06-06
Attending: EMERGENCY MEDICINE
Payer: MEDICARE

## 2025-06-06 ENCOUNTER — APPOINTMENT (OUTPATIENT)
Dept: RADIOLOGY | Facility: HOSPITAL | Age: 89
DRG: 377 | End: 2025-06-06
Payer: MEDICARE

## 2025-06-06 VITALS
SYSTOLIC BLOOD PRESSURE: 149 MMHG | TEMPERATURE: 98.8 F | OXYGEN SATURATION: 100 % | DIASTOLIC BLOOD PRESSURE: 64 MMHG | WEIGHT: 105 LBS | RESPIRATION RATE: 16 BRPM | HEART RATE: 99 BPM | BODY MASS INDEX: 16.88 KG/M2 | HEIGHT: 66 IN
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
## 2025-06-06 DIAGNOSIS — S42.212A CLOSED DISPLACED FRACTURE OF SURGICAL NECK OF LEFT HUMERUS, UNSPECIFIED FRACTURE MORPHOLOGY, INITIAL ENCOUNTER: ICD-10-CM

## 2025-06-06 PROCEDURE — 2500000001 HC RX 250 WO HCPCS SELF ADMINISTERED DRUGS (ALT 637 FOR MEDICARE OP): Performed by: PHYSICIAN ASSISTANT

## 2025-06-06 PROCEDURE — 99284 EMERGENCY DEPT VISIT MOD MDM: CPT | Mod: 25 | Performed by: EMERGENCY MEDICINE

## 2025-06-06 PROCEDURE — 72125 CT NECK SPINE W/O DYE: CPT | Performed by: STUDENT IN AN ORGANIZED HEALTH CARE EDUCATION/TRAINING PROGRAM

## 2025-06-06 PROCEDURE — 70450 CT HEAD/BRAIN W/O DYE: CPT | Performed by: STUDENT IN AN ORGANIZED HEALTH CARE EDUCATION/TRAINING PROGRAM

## 2025-06-06 PROCEDURE — 72125 CT NECK SPINE W/O DYE: CPT

## 2025-06-06 PROCEDURE — 73030 X-RAY EXAM OF SHOULDER: CPT | Mod: LT

## 2025-06-06 PROCEDURE — 73030 X-RAY EXAM OF SHOULDER: CPT | Mod: LEFT SIDE | Performed by: RADIOLOGY

## 2025-06-06 PROCEDURE — 70450 CT HEAD/BRAIN W/O DYE: CPT

## 2025-06-06 RX ORDER — OXYCODONE AND ACETAMINOPHEN 5; 325 MG/1; MG/1
1 TABLET ORAL ONCE
Refills: 0 | Status: COMPLETED | OUTPATIENT
Start: 2025-06-06 | End: 2025-06-06

## 2025-06-06 RX ORDER — HYDROCODONE BITARTRATE AND ACETAMINOPHEN 5; 325 MG/1; MG/1
1 TABLET ORAL EVERY 4 HOURS PRN
Qty: 18 TABLET | Refills: 0 | Status: ON HOLD | OUTPATIENT
Start: 2025-06-06 | End: 2025-06-09

## 2025-06-06 RX ADMIN — OXYCODONE HYDROCHLORIDE AND ACETAMINOPHEN 1 TABLET: 5; 325 TABLET ORAL at 17:33

## 2025-06-06 ASSESSMENT — PAIN DESCRIPTION - LOCATION: LOCATION: SHOULDER

## 2025-06-06 ASSESSMENT — PAIN - FUNCTIONAL ASSESSMENT
PAIN_FUNCTIONAL_ASSESSMENT: 0-10
PAIN_FUNCTIONAL_ASSESSMENT: 0-10

## 2025-06-06 ASSESSMENT — PAIN SCALES - GENERAL: PAINLEVEL_OUTOF10: 10 - WORST POSSIBLE PAIN

## 2025-06-06 ASSESSMENT — LIFESTYLE VARIABLES
EVER FELT BAD OR GUILTY ABOUT YOUR DRINKING: NO
TOTAL SCORE: 0
HAVE PEOPLE ANNOYED YOU BY CRITICIZING YOUR DRINKING: NO
EVER HAD A DRINK FIRST THING IN THE MORNING TO STEADY YOUR NERVES TO GET RID OF A HANGOVER: NO
HAVE YOU EVER FELT YOU SHOULD CUT DOWN ON YOUR DRINKING: NO

## 2025-06-06 ASSESSMENT — COLUMBIA-SUICIDE SEVERITY RATING SCALE - C-SSRS
6. HAVE YOU EVER DONE ANYTHING, STARTED TO DO ANYTHING, OR PREPARED TO DO ANYTHING TO END YOUR LIFE?: NO
2. HAVE YOU ACTUALLY HAD ANY THOUGHTS OF KILLING YOURSELF?: NO
1. IN THE PAST MONTH, HAVE YOU WISHED YOU WERE DEAD OR WISHED YOU COULD GO TO SLEEP AND NOT WAKE UP?: NO

## 2025-06-06 ASSESSMENT — PAIN DESCRIPTION - PAIN TYPE: TYPE: ACUTE PAIN

## 2025-06-06 ASSESSMENT — PAIN DESCRIPTION - ORIENTATION: ORIENTATION: LEFT

## 2025-06-06 NOTE — ED PROVIDER NOTES
HPI   Chief Complaint   Patient presents with    Fall     Trip and fall outside, denies loc c/o of left shoulder pain at this time.        This is an 89-year-old female with PMH CAD, HTN, HLD, PVD, mitral regurg, OA, COPD, CKD stage IV, A-fib presenting for evaluation of fall.  Had a mechanical fall in her driveway when she tripped over a brick and fell onto her left side.  Chief complaint of left arm pain.  Was c-collar prior to arrival.  Denies hitting her head.  Denies thinners.  States she cannot move her left shoulder at all due to pain.  10 out of 10.  Denies any neck pain, back pain, numbness, tingling, loss of sensation, abdominal pain, chest pain, shortness of breath.      History provided by:  Patient   used: No            Patient History   Medical History[1]  Surgical History[2]  Family History[3]  Social History[4]    Physical Exam   ED Triage Vitals [06/06/25 1719]   Temperature Heart Rate Respirations BP   37.3 °C (99.1 °F) (!) 112 20 157/73      Pulse Ox Temp Source Heart Rate Source Patient Position   97 % Temporal Monitor Lying      BP Location FiO2 (%)     Right arm --       Physical Exam    Gen.: Vitals noted no distress  Head: Normocephalic, atraumatic. Eyes PERRL, EOMI. Gaze conjugate. No orbital ridge bony step-offs, or tenderness. No entrapment signs. Midface stable. No nasal septal hematoma. No evidence of intraoral injury. Posterior oropharynx patent. No jaw malocclusion.  No intraoral lesions.    Neck: C-collar present.  No midline or paraspinal tenderness. No step-off or crepitance.  Cardiac: Regular rate and rhythm.  Grade 2/6 diastolic murmur heard best at the LSB.  Lungs: Clear to auscultation bilaterally with good aeration. No chest wall tenderness. No crepitus.   Abdomen: Soft, nontender, nondistended.  No bruising or abrasions noted.   Back: No midline TTP. No step-offs or deformities.  No lacerations, abrasions, or bruising noted.  Extremities: Exam of the [facility]  shows tenderness to palpation of the proximal humerus.  No obvious deformity.  No effusion.  The skin is intact.  No clavicle tenderness.  Is neurovascularly intact distally.  The remainder of the LUE is nontender.  Skin: No abrasions. No lacerations.  Neuro: GCS is 15. Alert and oriented to person, place, time.  Face symmetric, speech fluent.  Gross motor and sensory function intact in the remaining upper and lower extremities.    ED Course & MDM   Diagnoses as of 06/06/25 1939   Fall, initial encounter   Closed displaced fracture of surgical neck of left humerus, unspecified fracture morphology, initial encounter                 No data recorded     Tyrel Coma Scale Score: 14 (06/06/25 1724 : Saniya Wells, [place])                           Medical Decision Making  DDx: Fracture, dislocation, nonvisualized/occult fracture, tendon/ligament injury, soft tissue injury, ICH, cervical spine fracture, cervical spine subluxation    Physical exam as above.  Neurovascularly intact.  Mentating appropriately.  CT scan showed no acute intracranial process no acute cervical spine findings.  Was cleared of the c-collar.  X-ray showed what appears to be acute humeral neck fracture per my independent interpretation.  Arm sling was placed by nursing.  Normal neurovascular exam prior to and after sling placement.  Was given a dose of 5 mg oral Percocet here.  Will give [place] for home.  Orthopedic referral given.  Instructed to return to the nearest ED if any concerns or new or worsening symptoms. Patient verbalized understanding and agreement with plan. Discharged in stable condition.  This visit was staffed with the attending physician Dr. Alcantar.      Disclaimer: This note was dictated using speech recognition software. An attempt at proofreading was made to minimize errors. Minor errors in transcription may be present.    Amount and/or Complexity of Data Reviewed  Radiology: ordered.        Procedure  Procedures        [1]   Past Medical History:  Diagnosis Date    COPD (chronic obstructive pulmonary disease) (Multi)     High blood cholesterol     Hypertension     Personal history of other diseases of the circulatory system 2022    History of aortic valve stenosis   [2]   Past Surgical History:  Procedure Laterality Date    CARDIAC CATHETERIZATION Left     CATARACT EXTRACTION Right     OTHER SURGICAL HISTORY  2019    Hysterectomy    OTHER SURGICAL HISTORY  2021    Colonoscopy    OTHER SURGICAL HISTORY  2021    Dilation and curettage    OTHER SURGICAL HISTORY  2021    Esophagogastroduodenoscopy    OTHER SURGICAL HISTORY  10/20/2021    Transcatheter aortic valve replacement   [3]   Family History  Problem Relation Name Age of Onset    Stroke Father      Other (cerebrovascular accident) Father          cerebrovascular accident (CVA)    Coronary artery disease Father     [4]   Social History  Tobacco Use    Smoking status: Former     Current packs/day: 0.00     Types: Cigarettes     Start date:      Quit date:      Years since quittin.4     Passive exposure: Never    Smokeless tobacco: Never   Vaping Use    Vaping status: Never Used   Substance Use Topics    Alcohol use: Never    Drug use: Never        Hal Melgoza PA-C  25

## 2025-06-07 ENCOUNTER — APPOINTMENT (OUTPATIENT)
Dept: CARDIOLOGY | Facility: HOSPITAL | Age: 89
DRG: 377 | End: 2025-06-07
Payer: MEDICARE

## 2025-06-07 ENCOUNTER — HOSPITAL ENCOUNTER (INPATIENT)
Facility: HOSPITAL | Age: 89
End: 2025-06-07
Attending: EMERGENCY MEDICINE | Admitting: STUDENT IN AN ORGANIZED HEALTH CARE EDUCATION/TRAINING PROGRAM
Payer: MEDICARE

## 2025-06-07 ENCOUNTER — APPOINTMENT (OUTPATIENT)
Dept: RADIOLOGY | Facility: HOSPITAL | Age: 89
DRG: 377 | End: 2025-06-07
Payer: MEDICARE

## 2025-06-07 DIAGNOSIS — N18.9 ACUTE ON CHRONIC RENAL INSUFFICIENCY: ICD-10-CM

## 2025-06-07 DIAGNOSIS — N30.00 ACUTE CYSTITIS WITHOUT HEMATURIA: ICD-10-CM

## 2025-06-07 DIAGNOSIS — R29.6 FREQUENT FALLS: Primary | ICD-10-CM

## 2025-06-07 DIAGNOSIS — N28.9 ACUTE ON CHRONIC RENAL INSUFFICIENCY: ICD-10-CM

## 2025-06-07 DIAGNOSIS — D64.9 ANEMIA, UNSPECIFIED TYPE: ICD-10-CM

## 2025-06-07 DIAGNOSIS — K92.2 GASTROINTESTINAL HEMORRHAGE, UNSPECIFIED GASTROINTESTINAL HEMORRHAGE TYPE: ICD-10-CM

## 2025-06-07 LAB
ABO GROUP (TYPE) IN BLOOD: NORMAL
ALBUMIN SERPL BCP-MCNC: 2.9 G/DL (ref 3.4–5)
ALP SERPL-CCNC: 85 U/L (ref 33–136)
ALT SERPL W P-5'-P-CCNC: 8 U/L (ref 7–45)
ANION GAP SERPL CALC-SCNC: 17 MMOL/L (ref 10–20)
ANTIBODY SCREEN: NORMAL
APPEARANCE UR: ABNORMAL
AST SERPL W P-5'-P-CCNC: 10 U/L (ref 9–39)
ATRIAL RATE: 89 BPM
BACTERIA #/AREA URNS AUTO: ABNORMAL /HPF
BASOPHILS # BLD AUTO: 0.01 X10*3/UL (ref 0–0.1)
BASOPHILS NFR BLD AUTO: 0.2 %
BILIRUB SERPL-MCNC: 0.3 MG/DL (ref 0–1.2)
BILIRUB UR STRIP.AUTO-MCNC: NEGATIVE MG/DL
BLOOD EXPIRATION DATE: NORMAL
BLOOD EXPIRATION DATE: NORMAL
BNP SERPL-MCNC: 275 PG/ML (ref 0–99)
BUN SERPL-MCNC: 53 MG/DL (ref 6–23)
CALCIUM SERPL-MCNC: 8.4 MG/DL (ref 8.6–10.3)
CARDIAC TROPONIN I PNL SERPL HS: 23 NG/L (ref 0–13)
CARDIAC TROPONIN I PNL SERPL HS: 26 NG/L (ref 0–13)
CHLORIDE SERPL-SCNC: 108 MMOL/L (ref 98–107)
CO2 SERPL-SCNC: 18 MMOL/L (ref 21–32)
COLOR UR: ABNORMAL
CREAT SERPL-MCNC: 2.57 MG/DL (ref 0.5–1.05)
DISPENSE STATUS: NORMAL
DISPENSE STATUS: NORMAL
EGFRCR SERPLBLD CKD-EPI 2021: 17 ML/MIN/1.73M*2
EOSINOPHIL # BLD AUTO: 0.17 X10*3/UL (ref 0–0.4)
EOSINOPHIL NFR BLD AUTO: 3.1 %
ERYTHROCYTE [DISTWIDTH] IN BLOOD BY AUTOMATED COUNT: 13.9 % (ref 11.5–14.5)
ERYTHROCYTE [DISTWIDTH] IN BLOOD BY AUTOMATED COUNT: 20.6 % (ref 11.5–14.5)
FERRITIN SERPL-MCNC: 247 NG/ML (ref 8–150)
GLUCOSE SERPL-MCNC: 110 MG/DL (ref 74–99)
GLUCOSE UR STRIP.AUTO-MCNC: NORMAL MG/DL
HCT VFR BLD AUTO: 18.1 % (ref 36–46)
HCT VFR BLD AUTO: 27.7 % (ref 36–46)
HGB BLD-MCNC: 5.6 G/DL (ref 12–16)
HGB BLD-MCNC: 9.2 G/DL (ref 12–16)
IMM GRANULOCYTES # BLD AUTO: 0.03 X10*3/UL (ref 0–0.5)
IMM GRANULOCYTES NFR BLD AUTO: 0.5 % (ref 0–0.9)
INR PPP: 1.3 (ref 0.9–1.1)
IRON SATN MFR SERPL: 18 % (ref 25–45)
IRON SATN MFR SERPL: NORMAL %
IRON SERPL-MCNC: 30 UG/DL (ref 35–150)
IRON SERPL-MCNC: NORMAL UG/DL
KETONES UR STRIP.AUTO-MCNC: NEGATIVE MG/DL
LACTATE SERPL-SCNC: 0.6 MMOL/L (ref 0.4–2)
LEUKOCYTE ESTERASE UR QL STRIP.AUTO: ABNORMAL
LYMPHOCYTES # BLD AUTO: 0.8 X10*3/UL (ref 0.8–3)
LYMPHOCYTES NFR BLD AUTO: 14.5 %
MAGNESIUM SERPL-MCNC: 1.87 MG/DL (ref 1.6–2.4)
MCH RBC QN AUTO: 32.5 PG (ref 26–34)
MCH RBC QN AUTO: 35.2 PG (ref 26–34)
MCHC RBC AUTO-ENTMCNC: 30.9 G/DL (ref 32–36)
MCHC RBC AUTO-ENTMCNC: 33.2 G/DL (ref 32–36)
MCV RBC AUTO: 114 FL (ref 80–100)
MCV RBC AUTO: 98 FL (ref 80–100)
MONOCYTES # BLD AUTO: 0.53 X10*3/UL (ref 0.05–0.8)
MONOCYTES NFR BLD AUTO: 9.6 %
NEUTROPHILS # BLD AUTO: 3.97 X10*3/UL (ref 1.6–5.5)
NEUTROPHILS NFR BLD AUTO: 72.1 %
NITRITE UR QL STRIP.AUTO: ABNORMAL
NRBC BLD-RTO: 0 /100 WBCS (ref 0–0)
NRBC BLD-RTO: 0 /100 WBCS (ref 0–0)
P AXIS: 76 DEGREES
P OFFSET: 206 MS
P ONSET: 149 MS
PH UR STRIP.AUTO: 5.5 [PH]
PLATELET # BLD AUTO: 156 X10*3/UL (ref 150–450)
PLATELET # BLD AUTO: 174 X10*3/UL (ref 150–450)
POTASSIUM SERPL-SCNC: 5.2 MMOL/L (ref 3.5–5.3)
PR INTERVAL: 134 MS
PRODUCT BLOOD TYPE: 6200
PRODUCT BLOOD TYPE: 6200
PRODUCT CODE: NORMAL
PRODUCT CODE: NORMAL
PROT SERPL-MCNC: 8.8 G/DL (ref 6.4–8.2)
PROT UR STRIP.AUTO-MCNC: ABNORMAL MG/DL
PROTHROMBIN TIME: 14 SECONDS (ref 9.8–12.4)
Q ONSET: 216 MS
QRS COUNT: 15 BEATS
QRS DURATION: 88 MS
QT INTERVAL: 378 MS
QTC CALCULATION(BAZETT): 459 MS
QTC FREDERICIA: 430 MS
R AXIS: 66 DEGREES
RBC # BLD AUTO: 1.59 X10*6/UL (ref 4–5.2)
RBC # BLD AUTO: 2.83 X10*6/UL (ref 4–5.2)
RBC # UR STRIP.AUTO: ABNORMAL MG/DL
RBC #/AREA URNS AUTO: ABNORMAL /HPF
RH FACTOR (ANTIGEN D): NORMAL
SODIUM SERPL-SCNC: 138 MMOL/L (ref 136–145)
SP GR UR STRIP.AUTO: 1.01
T AXIS: 67 DEGREES
T OFFSET: 405 MS
TIBC SERPL-MCNC: 167 UG/DL (ref 240–445)
TIBC SERPL-MCNC: NORMAL UG/DL
UIBC SERPL-MCNC: 137 UG/DL (ref 110–370)
UIBC SERPL-MCNC: NORMAL UG/DL
UNIT ABO: NORMAL
UNIT ABO: NORMAL
UNIT NUMBER: NORMAL
UNIT NUMBER: NORMAL
UNIT RH: NORMAL
UNIT RH: NORMAL
UNIT VOLUME: 350
UNIT VOLUME: 350
UROBILINOGEN UR STRIP.AUTO-MCNC: NORMAL MG/DL
VENTRICULAR RATE: 89 BPM
WBC # BLD AUTO: 5.5 X10*3/UL (ref 4.4–11.3)
WBC # BLD AUTO: 6.4 X10*3/UL (ref 4.4–11.3)
WBC #/AREA URNS AUTO: >50 /HPF
WBC CLUMPS #/AREA URNS AUTO: ABNORMAL /HPF
XM INTEP: NORMAL
XM INTEP: NORMAL

## 2025-06-07 PROCEDURE — 2500000004 HC RX 250 GENERAL PHARMACY W/ HCPCS (ALT 636 FOR OP/ED)

## 2025-06-07 PROCEDURE — 36415 COLL VENOUS BLD VENIPUNCTURE: CPT | Performed by: PHYSICIAN ASSISTANT

## 2025-06-07 PROCEDURE — 72125 CT NECK SPINE W/O DYE: CPT

## 2025-06-07 PROCEDURE — 85610 PROTHROMBIN TIME: CPT | Performed by: PHYSICIAN ASSISTANT

## 2025-06-07 PROCEDURE — 74176 CT ABD & PELVIS W/O CONTRAST: CPT

## 2025-06-07 PROCEDURE — 82728 ASSAY OF FERRITIN: CPT

## 2025-06-07 PROCEDURE — 99291 CRITICAL CARE FIRST HOUR: CPT | Performed by: EMERGENCY MEDICINE

## 2025-06-07 PROCEDURE — 70450 CT HEAD/BRAIN W/O DYE: CPT

## 2025-06-07 PROCEDURE — 70450 CT HEAD/BRAIN W/O DYE: CPT | Performed by: RADIOLOGY

## 2025-06-07 PROCEDURE — 96375 TX/PRO/DX INJ NEW DRUG ADDON: CPT

## 2025-06-07 PROCEDURE — 84075 ASSAY ALKALINE PHOSPHATASE: CPT | Performed by: PHYSICIAN ASSISTANT

## 2025-06-07 PROCEDURE — 74176 CT ABD & PELVIS W/O CONTRAST: CPT | Performed by: RADIOLOGY

## 2025-06-07 PROCEDURE — 85025 COMPLETE CBC W/AUTO DIFF WBC: CPT | Performed by: PHYSICIAN ASSISTANT

## 2025-06-07 PROCEDURE — 71045 X-RAY EXAM CHEST 1 VIEW: CPT

## 2025-06-07 PROCEDURE — 87086 URINE CULTURE/COLONY COUNT: CPT | Mod: ELYLAB | Performed by: PHYSICIAN ASSISTANT

## 2025-06-07 PROCEDURE — 2500000004 HC RX 250 GENERAL PHARMACY W/ HCPCS (ALT 636 FOR OP/ED): Performed by: PHYSICIAN ASSISTANT

## 2025-06-07 PROCEDURE — P9016 RBC LEUKOCYTES REDUCED: HCPCS

## 2025-06-07 PROCEDURE — 83880 ASSAY OF NATRIURETIC PEPTIDE: CPT | Performed by: PHYSICIAN ASSISTANT

## 2025-06-07 PROCEDURE — 93005 ELECTROCARDIOGRAM TRACING: CPT

## 2025-06-07 PROCEDURE — 1200000002 HC GENERAL ROOM WITH TELEMETRY DAILY

## 2025-06-07 PROCEDURE — 80053 COMPREHEN METABOLIC PANEL: CPT | Performed by: PHYSICIAN ASSISTANT

## 2025-06-07 PROCEDURE — 99291 CRITICAL CARE FIRST HOUR: CPT | Performed by: PHYSICIAN ASSISTANT

## 2025-06-07 PROCEDURE — 96374 THER/PROPH/DIAG INJ IV PUSH: CPT

## 2025-06-07 PROCEDURE — 86923 COMPATIBILITY TEST ELECTRIC: CPT

## 2025-06-07 PROCEDURE — 72131 CT LUMBAR SPINE W/O DYE: CPT | Performed by: STUDENT IN AN ORGANIZED HEALTH CARE EDUCATION/TRAINING PROGRAM

## 2025-06-07 PROCEDURE — 87899 AGENT NOS ASSAY W/OPTIC: CPT | Mod: ELYLAB | Performed by: NURSE PRACTITIONER

## 2025-06-07 PROCEDURE — 84484 ASSAY OF TROPONIN QUANT: CPT | Performed by: PHYSICIAN ASSISTANT

## 2025-06-07 PROCEDURE — 72125 CT NECK SPINE W/O DYE: CPT | Performed by: RADIOLOGY

## 2025-06-07 PROCEDURE — 83605 ASSAY OF LACTIC ACID: CPT | Performed by: PHYSICIAN ASSISTANT

## 2025-06-07 PROCEDURE — 81001 URINALYSIS AUTO W/SCOPE: CPT | Performed by: PHYSICIAN ASSISTANT

## 2025-06-07 PROCEDURE — 99223 1ST HOSP IP/OBS HIGH 75: CPT

## 2025-06-07 PROCEDURE — 71250 CT THORAX DX C-: CPT | Performed by: RADIOLOGY

## 2025-06-07 PROCEDURE — 83735 ASSAY OF MAGNESIUM: CPT | Performed by: PHYSICIAN ASSISTANT

## 2025-06-07 PROCEDURE — 72128 CT CHEST SPINE W/O DYE: CPT | Performed by: STUDENT IN AN ORGANIZED HEALTH CARE EDUCATION/TRAINING PROGRAM

## 2025-06-07 PROCEDURE — 86901 BLOOD TYPING SEROLOGIC RH(D): CPT | Performed by: PHYSICIAN ASSISTANT

## 2025-06-07 PROCEDURE — 83540 ASSAY OF IRON: CPT | Performed by: STUDENT IN AN ORGANIZED HEALTH CARE EDUCATION/TRAINING PROGRAM

## 2025-06-07 PROCEDURE — 2500000004 HC RX 250 GENERAL PHARMACY W/ HCPCS (ALT 636 FOR OP/ED): Performed by: STUDENT IN AN ORGANIZED HEALTH CARE EDUCATION/TRAINING PROGRAM

## 2025-06-07 PROCEDURE — 83550 IRON BINDING TEST: CPT | Performed by: STUDENT IN AN ORGANIZED HEALTH CARE EDUCATION/TRAINING PROGRAM

## 2025-06-07 PROCEDURE — 71045 X-RAY EXAM CHEST 1 VIEW: CPT | Performed by: RADIOLOGY

## 2025-06-07 PROCEDURE — 85027 COMPLETE CBC AUTOMATED: CPT | Performed by: STUDENT IN AN ORGANIZED HEALTH CARE EDUCATION/TRAINING PROGRAM

## 2025-06-07 PROCEDURE — 36430 TRANSFUSION BLD/BLD COMPNT: CPT

## 2025-06-07 PROCEDURE — 87449 NOS EACH ORGANISM AG IA: CPT | Mod: ELYLAB | Performed by: NURSE PRACTITIONER

## 2025-06-07 PROCEDURE — 85060 BLOOD SMEAR INTERPRETATION: CPT | Performed by: PATHOLOGY

## 2025-06-07 PROCEDURE — 83540 ASSAY OF IRON: CPT

## 2025-06-07 PROCEDURE — 30233N1 TRANSFUSION OF NONAUTOLOGOUS RED BLOOD CELLS INTO PERIPHERAL VEIN, PERCUTANEOUS APPROACH: ICD-10-PCS

## 2025-06-07 PROCEDURE — 2500000001 HC RX 250 WO HCPCS SELF ADMINISTERED DRUGS (ALT 637 FOR MEDICARE OP): Performed by: STUDENT IN AN ORGANIZED HEALTH CARE EDUCATION/TRAINING PROGRAM

## 2025-06-07 RX ORDER — DONEPEZIL HYDROCHLORIDE 5 MG/1
10 TABLET, FILM COATED ORAL NIGHTLY
Status: DISPENSED | OUTPATIENT
Start: 2025-06-07

## 2025-06-07 RX ORDER — MONTELUKAST SODIUM 10 MG/1
10 TABLET ORAL DAILY
Status: DISPENSED | OUTPATIENT
Start: 2025-06-07

## 2025-06-07 RX ORDER — FLUTICASONE FUROATE AND VILANTEROL 200; 25 UG/1; UG/1
1 POWDER RESPIRATORY (INHALATION) DAILY
Status: DISPENSED | OUTPATIENT
Start: 2025-06-08

## 2025-06-07 RX ORDER — MORPHINE SULFATE 2 MG/ML
2 INJECTION, SOLUTION INTRAMUSCULAR; INTRAVENOUS ONCE
Status: COMPLETED | OUTPATIENT
Start: 2025-06-07 | End: 2025-06-07

## 2025-06-07 RX ORDER — ONDANSETRON HYDROCHLORIDE 2 MG/ML
4 INJECTION, SOLUTION INTRAVENOUS EVERY 8 HOURS PRN
Status: DISPENSED | OUTPATIENT
Start: 2025-06-07

## 2025-06-07 RX ORDER — POLYETHYLENE GLYCOL 3350 17 G/17G
17 POWDER, FOR SOLUTION ORAL DAILY
Status: ACTIVE | OUTPATIENT
Start: 2025-06-07

## 2025-06-07 RX ORDER — MORPHINE SULFATE 2 MG/ML
2 INJECTION, SOLUTION INTRAMUSCULAR; INTRAVENOUS EVERY 4 HOURS PRN
Status: DISPENSED | OUTPATIENT
Start: 2025-06-07

## 2025-06-07 RX ORDER — SODIUM CHLORIDE 9 MG/ML
80 INJECTION, SOLUTION INTRAVENOUS CONTINUOUS
Status: ACTIVE | OUTPATIENT
Start: 2025-06-07 | End: 2025-06-08

## 2025-06-07 RX ORDER — ONDANSETRON 4 MG/1
4 TABLET, FILM COATED ORAL EVERY 8 HOURS PRN
Status: ACTIVE | OUTPATIENT
Start: 2025-06-07

## 2025-06-07 RX ORDER — ACETAMINOPHEN 160 MG/5ML
650 SOLUTION ORAL EVERY 4 HOURS PRN
Status: ACTIVE | OUTPATIENT
Start: 2025-06-07

## 2025-06-07 RX ORDER — OXYCODONE HYDROCHLORIDE 5 MG/1
5 TABLET ORAL EVERY 6 HOURS PRN
Refills: 0 | Status: DISPENSED | OUTPATIENT
Start: 2025-06-07

## 2025-06-07 RX ORDER — ONDANSETRON HYDROCHLORIDE 2 MG/ML
4 INJECTION, SOLUTION INTRAVENOUS ONCE
Status: COMPLETED | OUTPATIENT
Start: 2025-06-07 | End: 2025-06-07

## 2025-06-07 RX ORDER — ACETAMINOPHEN 650 MG/1
650 SUPPOSITORY RECTAL EVERY 4 HOURS PRN
Status: ACTIVE | OUTPATIENT
Start: 2025-06-07

## 2025-06-07 RX ORDER — ACETAMINOPHEN 325 MG/1
650 TABLET ORAL EVERY 4 HOURS PRN
Status: ACTIVE | OUTPATIENT
Start: 2025-06-07

## 2025-06-07 RX ORDER — PANTOPRAZOLE SODIUM 40 MG/10ML
80 INJECTION, POWDER, LYOPHILIZED, FOR SOLUTION INTRAVENOUS ONCE
Status: COMPLETED | OUTPATIENT
Start: 2025-06-07 | End: 2025-06-07

## 2025-06-07 RX ORDER — ATORVASTATIN CALCIUM 20 MG/1
20 TABLET, FILM COATED ORAL NIGHTLY
Status: DISPENSED | OUTPATIENT
Start: 2025-06-07

## 2025-06-07 RX ADMIN — MONTELUKAST SODIUM 10 MG: 10 TABLET, FILM COATED ORAL at 18:21

## 2025-06-07 RX ADMIN — ATORVASTATIN CALCIUM 20 MG: 20 TABLET ORAL at 21:14

## 2025-06-07 RX ADMIN — SODIUM CHLORIDE 80 ML/HR: 0.9 INJECTION, SOLUTION INTRAVENOUS at 18:21

## 2025-06-07 RX ADMIN — OXYCODONE 5 MG: 5 TABLET ORAL at 21:14

## 2025-06-07 RX ADMIN — MORPHINE SULFATE 2 MG: 2 INJECTION, SOLUTION INTRAMUSCULAR; INTRAVENOUS at 23:04

## 2025-06-07 RX ADMIN — MORPHINE SULFATE 2 MG: 2 INJECTION, SOLUTION INTRAMUSCULAR; INTRAVENOUS at 13:51

## 2025-06-07 RX ADMIN — PANTOPRAZOLE SODIUM 80 MG: 40 INJECTION, POWDER, LYOPHILIZED, FOR SOLUTION INTRAVENOUS at 12:14

## 2025-06-07 RX ADMIN — DONEPEZIL HYDROCHLORIDE 10 MG: 5 TABLET ORAL at 21:14

## 2025-06-07 RX ADMIN — ONDANSETRON 4 MG: 2 INJECTION INTRAMUSCULAR; INTRAVENOUS at 18:32

## 2025-06-07 RX ADMIN — MORPHINE SULFATE 2 MG: 2 INJECTION, SOLUTION INTRAMUSCULAR; INTRAVENOUS at 18:32

## 2025-06-07 RX ADMIN — ONDANSETRON 4 MG: 2 INJECTION INTRAMUSCULAR; INTRAVENOUS at 13:52

## 2025-06-07 SDOH — SOCIAL STABILITY: SOCIAL NETWORK
DO YOU BELONG TO ANY CLUBS OR ORGANIZATIONS SUCH AS CHURCH GROUPS, UNIONS, FRATERNAL OR ATHLETIC GROUPS, OR SCHOOL GROUPS?: NO

## 2025-06-07 SDOH — ECONOMIC STABILITY: FOOD INSECURITY: WITHIN THE PAST 12 MONTHS, YOU WORRIED THAT YOUR FOOD WOULD RUN OUT BEFORE YOU GOT THE MONEY TO BUY MORE.: NEVER TRUE

## 2025-06-07 SDOH — SOCIAL STABILITY: SOCIAL INSECURITY: ARE YOU OR HAVE YOU BEEN THREATENED OR ABUSED PHYSICALLY, EMOTIONALLY, OR SEXUALLY BY ANYONE?: NO

## 2025-06-07 SDOH — SOCIAL STABILITY: SOCIAL INSECURITY: WITHIN THE LAST YEAR, HAVE YOU BEEN HUMILIATED OR EMOTIONALLY ABUSED IN OTHER WAYS BY YOUR PARTNER OR EX-PARTNER?: NO

## 2025-06-07 SDOH — SOCIAL STABILITY: SOCIAL INSECURITY: ARE YOU MARRIED, WIDOWED, DIVORCED, SEPARATED, NEVER MARRIED, OR LIVING WITH A PARTNER?: MARRIED

## 2025-06-07 SDOH — SOCIAL STABILITY: SOCIAL INSECURITY
WITHIN THE LAST YEAR, HAVE YOU BEEN KICKED, HIT, SLAPPED, OR OTHERWISE PHYSICALLY HURT BY YOUR PARTNER OR EX-PARTNER?: NO

## 2025-06-07 SDOH — HEALTH STABILITY: MENTAL HEALTH
DO YOU FEEL STRESS - TENSE, RESTLESS, NERVOUS, OR ANXIOUS, OR UNABLE TO SLEEP AT NIGHT BECAUSE YOUR MIND IS TROUBLED ALL THE TIME - THESE DAYS?: NOT AT ALL

## 2025-06-07 SDOH — SOCIAL STABILITY: SOCIAL INSECURITY
WITHIN THE LAST YEAR, HAVE YOU BEEN RAPED OR FORCED TO HAVE ANY KIND OF SEXUAL ACTIVITY BY YOUR PARTNER OR EX-PARTNER?: NO

## 2025-06-07 SDOH — ECONOMIC STABILITY: FOOD INSECURITY: WITHIN THE PAST 12 MONTHS, THE FOOD YOU BOUGHT JUST DIDN'T LAST AND YOU DIDN'T HAVE MONEY TO GET MORE.: NEVER TRUE

## 2025-06-07 SDOH — SOCIAL STABILITY: SOCIAL INSECURITY: DO YOU FEEL ANYONE HAS EXPLOITED OR TAKEN ADVANTAGE OF YOU FINANCIALLY OR OF YOUR PERSONAL PROPERTY?: NO

## 2025-06-07 SDOH — SOCIAL STABILITY: SOCIAL INSECURITY: DO YOU FEEL UNSAFE GOING BACK TO THE PLACE WHERE YOU ARE LIVING?: NO

## 2025-06-07 SDOH — SOCIAL STABILITY: SOCIAL NETWORK
IN A TYPICAL WEEK, HOW MANY TIMES DO YOU TALK ON THE PHONE WITH FAMILY, FRIENDS, OR NEIGHBORS?: MORE THAN THREE TIMES A WEEK

## 2025-06-07 SDOH — ECONOMIC STABILITY: INCOME INSECURITY: IN THE PAST 12 MONTHS HAS THE ELECTRIC, GAS, OIL, OR WATER COMPANY THREATENED TO SHUT OFF SERVICES IN YOUR HOME?: NO

## 2025-06-07 SDOH — SOCIAL STABILITY: SOCIAL NETWORK: HOW OFTEN DO YOU ATTEND CHURCH OR RELIGIOUS SERVICES?: NEVER

## 2025-06-07 SDOH — HEALTH STABILITY: PHYSICAL HEALTH: ON AVERAGE, HOW MANY MINUTES DO YOU ENGAGE IN EXERCISE AT THIS LEVEL?: 0 MIN

## 2025-06-07 SDOH — HEALTH STABILITY: PHYSICAL HEALTH: ON AVERAGE, HOW MANY DAYS PER WEEK DO YOU ENGAGE IN MODERATE TO STRENUOUS EXERCISE (LIKE A BRISK WALK)?: 0 DAYS

## 2025-06-07 SDOH — HEALTH STABILITY: PHYSICAL HEALTH
HOW OFTEN DO YOU NEED TO HAVE SOMEONE HELP YOU WHEN YOU READ INSTRUCTIONS, PAMPHLETS, OR OTHER WRITTEN MATERIAL FROM YOUR DOCTOR OR PHARMACY?: NEVER

## 2025-06-07 SDOH — SOCIAL STABILITY: SOCIAL NETWORK: HOW OFTEN DO YOU ATTEND MEETINGS OF THE CLUBS OR ORGANIZATIONS YOU BELONG TO?: NEVER

## 2025-06-07 SDOH — SOCIAL STABILITY: SOCIAL INSECURITY: WITHIN THE LAST YEAR, HAVE YOU BEEN AFRAID OF YOUR PARTNER OR EX-PARTNER?: NO

## 2025-06-07 SDOH — SOCIAL STABILITY: SOCIAL INSECURITY: WERE YOU ABLE TO COMPLETE ALL THE BEHAVIORAL HEALTH SCREENINGS?: YES

## 2025-06-07 SDOH — SOCIAL STABILITY: SOCIAL NETWORK: HOW OFTEN DO YOU GET TOGETHER WITH FRIENDS OR RELATIVES?: MORE THAN THREE TIMES A WEEK

## 2025-06-07 SDOH — SOCIAL STABILITY: SOCIAL INSECURITY: ABUSE: ADULT

## 2025-06-07 SDOH — SOCIAL STABILITY: SOCIAL INSECURITY: HAVE YOU HAD THOUGHTS OF HARMING ANYONE ELSE?: NO

## 2025-06-07 SDOH — SOCIAL STABILITY: SOCIAL INSECURITY: DOES ANYONE TRY TO KEEP YOU FROM HAVING/CONTACTING OTHER FRIENDS OR DOING THINGS OUTSIDE YOUR HOME?: NO

## 2025-06-07 SDOH — SOCIAL STABILITY: SOCIAL INSECURITY: HAS ANYONE EVER THREATENED TO HURT YOUR FAMILY OR YOUR PETS?: NO

## 2025-06-07 SDOH — SOCIAL STABILITY: SOCIAL INSECURITY: ARE THERE ANY APPARENT SIGNS OF INJURIES/BEHAVIORS THAT COULD BE RELATED TO ABUSE/NEGLECT?: NO

## 2025-06-07 SDOH — SOCIAL STABILITY: SOCIAL INSECURITY: HAVE YOU HAD ANY THOUGHTS OF HARMING ANYONE ELSE?: NO

## 2025-06-07 ASSESSMENT — PAIN - FUNCTIONAL ASSESSMENT
PAIN_FUNCTIONAL_ASSESSMENT: 0-10

## 2025-06-07 ASSESSMENT — PAIN SCALES - GENERAL
PAINLEVEL_OUTOF10: 4
PAINLEVEL_OUTOF10: 8
PAINLEVEL_OUTOF10: 5 - MODERATE PAIN
PAINLEVEL_OUTOF10: 3
PAINLEVEL_OUTOF10: 10 - WORST POSSIBLE PAIN
PAINLEVEL_OUTOF10: 0 - NO PAIN
PAINLEVEL_OUTOF10: 8

## 2025-06-07 ASSESSMENT — LIFESTYLE VARIABLES
AUDIT-C TOTAL SCORE: 0
AUDIT-C TOTAL SCORE: 0
HOW MANY STANDARD DRINKS CONTAINING ALCOHOL DO YOU HAVE ON A TYPICAL DAY: PATIENT DOES NOT DRINK
HAVE YOU EVER FELT YOU SHOULD CUT DOWN ON YOUR DRINKING: NO
SUBSTANCE_ABUSE_PAST_12_MONTHS: NO
HOW OFTEN DO YOU HAVE A DRINK CONTAINING ALCOHOL: NEVER
HOW OFTEN DO YOU HAVE 6 OR MORE DRINKS ON ONE OCCASION: NEVER
EVER HAD A DRINK FIRST THING IN THE MORNING TO STEADY YOUR NERVES TO GET RID OF A HANGOVER: NO
EVER FELT BAD OR GUILTY ABOUT YOUR DRINKING: NO
PRESCIPTION_ABUSE_PAST_12_MONTHS: NO
SKIP TO QUESTIONS 9-10: 1
TOTAL SCORE: 0
HAVE PEOPLE ANNOYED YOU BY CRITICIZING YOUR DRINKING: NO

## 2025-06-07 ASSESSMENT — COLUMBIA-SUICIDE SEVERITY RATING SCALE - C-SSRS
1. IN THE PAST MONTH, HAVE YOU WISHED YOU WERE DEAD OR WISHED YOU COULD GO TO SLEEP AND NOT WAKE UP?: NO
6. HAVE YOU EVER DONE ANYTHING, STARTED TO DO ANYTHING, OR PREPARED TO DO ANYTHING TO END YOUR LIFE?: NO
1. IN THE PAST MONTH, HAVE YOU WISHED YOU WERE DEAD OR WISHED YOU COULD GO TO SLEEP AND NOT WAKE UP?: NO
6. HAVE YOU EVER DONE ANYTHING, STARTED TO DO ANYTHING, OR PREPARED TO DO ANYTHING TO END YOUR LIFE?: NO
2. HAVE YOU ACTUALLY HAD ANY THOUGHTS OF KILLING YOURSELF?: NO
2. HAVE YOU ACTUALLY HAD ANY THOUGHTS OF KILLING YOURSELF?: NO

## 2025-06-07 ASSESSMENT — COGNITIVE AND FUNCTIONAL STATUS - GENERAL
TURNING FROM BACK TO SIDE WHILE IN FLAT BAD: A LOT
WALKING IN HOSPITAL ROOM: A LOT
DRESSING REGULAR UPPER BODY CLOTHING: A LOT
DRESSING REGULAR LOWER BODY CLOTHING: TOTAL
MOVING TO AND FROM BED TO CHAIR: A LOT
MOBILITY SCORE: 12
MOVING FROM LYING ON BACK TO SITTING ON SIDE OF FLAT BED WITH BEDRAILS: A LOT
PATIENT BASELINE BEDBOUND: NO
DAILY ACTIVITIY SCORE: 11
TOILETING: TOTAL
CLIMB 3 TO 5 STEPS WITH RAILING: A LOT
HELP NEEDED FOR BATHING: TOTAL
PERSONAL GROOMING: A LITTLE
EATING MEALS: A LITTLE
STANDING UP FROM CHAIR USING ARMS: A LOT

## 2025-06-07 ASSESSMENT — PAIN DESCRIPTION - LOCATION
LOCATION: BACK
LOCATION: SHOULDER

## 2025-06-07 ASSESSMENT — ACTIVITIES OF DAILY LIVING (ADL)
BATHING: NEEDS ASSISTANCE
HEARING - LEFT EAR: FUNCTIONAL
ADEQUATE_TO_COMPLETE_ADL: YES
GROOMING: NEEDS ASSISTANCE
ASSISTIVE_DEVICE: WALKER;CANE
WALKS IN HOME: NEEDS ASSISTANCE
JUDGMENT_ADEQUATE_SAFELY_COMPLETE_DAILY_ACTIVITIES: NO
LACK_OF_TRANSPORTATION: NO
FEEDING YOURSELF: INDEPENDENT
TOILETING: NEEDS ASSISTANCE
DRESSING YOURSELF: NEEDS ASSISTANCE
PATIENT'S MEMORY ADEQUATE TO SAFELY COMPLETE DAILY ACTIVITIES?: NO
HEARING - RIGHT EAR: FUNCTIONAL

## 2025-06-07 ASSESSMENT — PATIENT HEALTH QUESTIONNAIRE - PHQ9
1. LITTLE INTEREST OR PLEASURE IN DOING THINGS: NOT AT ALL
SUM OF ALL RESPONSES TO PHQ9 QUESTIONS 1 & 2: 0
2. FEELING DOWN, DEPRESSED OR HOPELESS: NOT AT ALL

## 2025-06-07 ASSESSMENT — PAIN DESCRIPTION - PAIN TYPE
TYPE: ACUTE PAIN
TYPE: ACUTE PAIN

## 2025-06-07 ASSESSMENT — PAIN DESCRIPTION - ORIENTATION: ORIENTATION: LEFT

## 2025-06-07 ASSESSMENT — PAIN DESCRIPTION - DESCRIPTORS: DESCRIPTORS: DISCOMFORT;SHARP;SORE;TENDER

## 2025-06-07 NOTE — ED PROCEDURE NOTE
Procedure  Critical Care    Performed by: Yue Gomez MD  Authorized by: Yue Gomez MD    Critical care provider statement:     Critical care time (minutes):  38    Critical care time was exclusive of:  Separately billable procedures and treating other patients    Critical care was necessary to treat or prevent imminent or life-threatening deterioration of the following conditions: severe anema, gi bleed.    Critical care was time spent personally by me on the following activities:  Ordering and performing treatments and interventions, ordering and review of laboratory studies, ordering and review of radiographic studies, pulse oximetry, re-evaluation of patient's condition, evaluation of patient's response to treatment and examination of patient    Care discussed with: admitting provider                 Yue Gomez MD  06/07/25 1142

## 2025-06-07 NOTE — H&P
History Of Present Illness  Bora Moreno is a 89 y.o. female with PMH CAD, HTN, HLD, PVD, mitral regurg, JAMAR, COPD, CKD stage IV, anemia, and A-fib not on OAC who is presenting with weakness status post fall.  Patient states she tripped yesterday and fell and presented to the emergency room where she was found to have a left humerus fracture.  Patient was discharged home and to follow-up Monday with orthopedics.  Today patient was sitting on the couch and was not able to get herself off the couch and  could not assist her and 911 was called.  Upon arrival hemoglobin was 5.6.  She states she did have a colonoscopy last time she was in the hospital which showed a couple polyps.  Patient is chronically anemic at baseline in the eights.  Patient has had intermittent dark stools.  Patient denies shortness of breath or chest pain.  Denies abdominal pain, nausea, vomiting or diarrhea.  Denies fever or chills.  Creatinine 2.57 which is elevated from baseline.  .  Hemoglobin 5.6.  All other imaging unremarkable with exception of age indeterminant L3-L5 compression fracture.  Patient will be admitted for further treatment.     Past Medical History  She has a past medical history of COPD (chronic obstructive pulmonary disease) (Multi), High blood cholesterol, Hypertension, and Personal history of other diseases of the circulatory system (07/29/2022).    Surgical History  She has a past surgical history that includes Other surgical history (04/29/2019); Other surgical history (09/25/2021); Other surgical history (09/25/2021); Other surgical history (09/25/2021); Other surgical history (10/20/2021); Cataract extraction (Right); and Cardiac catheterization (Left).     Social History  She reports that she quit smoking about 54 years ago. Her smoking use included cigarettes. She started smoking about 69 years ago. She has never been exposed to tobacco smoke. She has never used smokeless tobacco. She reports that  she does not drink alcohol and does not use drugs.    Family History  Family History[1]     Allergies  Captopril and Influenza virus vaccines    Review of Systems   Review of systems: 10 system were reviewed and were negative except what was mentioned in history of present illness    Physical Exam  Constitutional:       Appearance: She is cachectic. She is ill-appearing.   HENT:      Head: Normocephalic.      Mouth/Throat:      Mouth: Mucous membranes are moist.   Eyes:      Pupils: Pupils are equal, round, and reactive to light.   Cardiovascular:      Rate and Rhythm: Normal rate and regular rhythm.      Heart sounds: Normal heart sounds, S1 normal and S2 normal.   Pulmonary:      Effort: Pulmonary effort is normal.      Breath sounds: Normal breath sounds.   Abdominal:      General: Bowel sounds are normal.      Palpations: Abdomen is soft.   Musculoskeletal:         General: Normal range of motion.      Left upper arm: Tenderness present.      Cervical back: Neck supple.      Comments: Left arm in a sling   Skin:     General: Skin is warm.      Coloration: Skin is pale.      Findings: Ecchymosis present.   Neurological:      Mental Status: She is alert and oriented to person, place, and time.      Motor: Weakness present.   Psychiatric:         Mood and Affect: Mood normal.         Behavior: Behavior normal.          Last Recorded Vitals  /60   Pulse 80   Temp 36.6 °C (97.9 °F) (Temporal)   Resp 17   Wt 47.6 kg (105 lb)   SpO2 100%     Relevant Results  CBC:   Results from last 7 days   Lab Units 06/07/25  1053   WBC AUTO x10*3/uL 5.5   RBC AUTO x10*6/uL 1.59*   HEMOGLOBIN g/dL 5.6*   HEMATOCRIT % 18.1*   MCV fL 114*   MCH pg 35.2*   MCHC g/dL 30.9*   RDW % 13.9   PLATELETS AUTO x10*3/uL 174     CMP:    Results from last 7 days   Lab Units 06/07/25  1053   SODIUM mmol/L 138   POTASSIUM mmol/L 5.2   CHLORIDE mmol/L 108*   CO2 mmol/L 18*   BUN mg/dL 53*   CREATININE mg/dL 2.57*   GLUCOSE mg/dL 110*    PROTEIN TOTAL g/dL 8.8*   CALCIUM mg/dL 8.4*   BILIRUBIN TOTAL mg/dL 0.3   ALK PHOS U/L 85   AST U/L 10   ALT U/L 8     BMP:    Results from last 7 days   Lab Units 06/07/25  1053   SODIUM mmol/L 138   POTASSIUM mmol/L 5.2   CHLORIDE mmol/L 108*   CO2 mmol/L 18*   BUN mg/dL 53*   CREATININE mg/dL 2.57*   CALCIUM mg/dL 8.4*   GLUCOSE mg/dL 110*     Magnesium:  Results from last 7 days   Lab Units 06/07/25  1053   MAGNESIUM mg/dL 1.87     Troponin:    Results from last 7 days   Lab Units 06/07/25  1153 06/07/25  1053   TROPHS ng/L 23* 26*     BNP:   Results from last 7 days   Lab Units 06/07/25  1053   BNP pg/mL 275*     Lipid Panel:    Imagining  CT lumbar spine retrospective reconstruction protocol  Narrative: Interpreted By:  Christian Allen,   STUDY:  CT LUMBAR SPINE RETROSPECTIVE RECONSTRUCTION PROTOCOL  6/7/2025 11:31  am      INDICATION:  Signs/Symptoms:Fall          COMPARISON:  None.      ACCESSION NUMBER(S):  [id number]      ORDERING CLINICIAN:  ANDREA GONSALES      TECHNIQUE:  Axial CT images of the lumbar spine are obtained. Axial, coronal and  sagittal reconstructions are provided for review.      FINDINGS:  Alignment:  Within normal limits.      Vertebrae:  Age-indeterminate L3, L4 on L5 vertebral body compression  fractures with slight height loss      Degenerative changes: Multilevel degenerative disc disease moderate  at L4-L5 and mild at other lumbar levels. Mild facet hypertrophy  L3-L4 and L4-L5.      Lower Thoracic Spine: No significant bony spinal canal stenosis in  the included lower thoracic region.      T12-L1: No significant bony spinal canal stenosis.      L1-L2: No significant bony spinal canal or foraminal stenosis.      L2-L3: No significant bony spinal canal or foraminal stenosis.      L3-L4: No significant bony spinal canal or foraminal stenosis.      L4-L5: No significant bony spinal canal or foraminal stenosis.      L5-S1: No significant bony spinal canal or foraminal stenosis.       Prevertebral/Paraspinal Soft Tissues: Unremarkable.      Impression: L3-L5 vertebral body compression fractures are age-indeterminate in  the absence of comparison exams. While favored remote an MRI would be  required for confirmation.          No significant lumbar bony spinal canal or foraminal stenosis.      MACRO:  None      Signed by: Christian Allen 6/7/2025 12:37 PM  Dictation workstation:   [id number]  CT thoracic spine retrospective reconstruction protocol  Narrative: Interpreted By:  Christian Allen,   STUDY:  CT THORACIC SPINE RETROSPECTIVE RECONSTRUCTION PROTOCOL;  6/7/2025  11:31 am      INDICATION:  Signs/Symptoms:Fall.          COMPARISON:  None.      ACCESSION NUMBER(S):  [id number]      ORDERING CLINICIAN:  ANDREA GONSALES      TECHNIQUE:  Axial CT images of the thoracic spine are obtained. Axial, coronal  and sagittal reconstructions are provided for review.      FINDINGS:  Vertebrae: No evidence of acute fracture of the thoracic spine.  Vertebral body heights are maintained.      Vertebral Alignment: Within normal limits..      Spinal canal: Grossly unremarkable.      Degenerative: Mild multilevel degenerative disc disease. Mild facet  arthropathy T4-T5, T9-T10 and T10-T11. No significant bony foraminal  or canal stenosis. Osteopenia.      Prevertebral/Paraspinal Soft Tissues: Unremarkable.          Impression: No evidence of acute fracture or traumatic malalignment of the  thoracic spine.      Mild degenerative disc and facet disease without significant bony  foraminal or canal stenosis.      MACRO:  None      Signed by: Christian Allen 6/7/2025 12:18 PM  Dictation workstation:   [id number]  CT chest abdomen pelvis wo IV contrast  Narrative: Interpreted By:  Rome Ernst,   STUDY:  CT CHEST ABDOMEN PELVIS WO CONTRAST;  6/7/2025 11:31 am      INDICATION:  Signs/Symptoms:Fall      COMPARISON:  None.      ACCESSION NUMBER(S):  [id number]      ORDERING CLINICIAN:  ANDREA GONSALES      TECHNIQUE:  CT  of the chest, abdomen, and pelvis was performed.  Sequential transaxial images were obtained with orthogonal  reconstructions. N/A of N/A was administered intravenously without  immediate complication.      FINDINGS:  CHEST:      LUNG/PLEURA/LARGE AIRWAYS:  Several bands of posterior basilar atelectasis and/or fibrosis. Trace  left pleural effusion, and/or pleural fibrosis. An approximate 5 cm  area of tree-in-bud and centrilobular nodularity at the right upper  lobe posterolaterally is probably inflammatory such as bronchiolitis.  However, follow-up in 1 year would be recommended if there are risk  factors for malignancy, as a malignant nodule in the region is not  excluded.          VESSELS:  Status post TAVR. There is moderate atherosclerotic calcification of  the aorta. No aneurysm.      Main pulmonary artery and its branches are normal in caliber.      There is mild  coronary artery atherosclerotic calcification  primarily involving the LAD.      HEART:  The heart is normal in size.  There is no pericardial effusion.      MEDIASTINUM AND SHENG:  No significant mediastinal or hilar adenopathy.      The thyroid gland as visualized appears unremarkable.      CHEST WALL AND LOWER NECK:  Acute appearing fracture of the neck and proximal shaft of the  humerus with impaction by proximally 1 cm. Otherwise no suspicious  osseous lesions.      The thoracic wall soft tissues are within normal limits.      ABDOMEN:      LIVER:  The hepatic parenchyma is homogeneous without evidence of focal liver  lesions.The hepatic size is normal.      SPLEEN:  The spleen is normal in size and homogeneous.      ADRENAL GLANDS:  Bilateral adrenal glands appear normal.      KIDNEYS AND URETERS:  The renal cortices maintained and the renal sizes within normal  limits , with cortical cyst(s). Additionally there is an approximate  7 mm exophytic nodularity of the left lower renal pole of relative  high but nonspecific attenuation, with  additional similar 1 cm  nodularity at the midpole. While probably a hemorrhagic cyst, solid  neoplasm is not excluded. Sonographic correlation would be  recommended on an outpatient basis.      There is mild left pelvocaliectasis with reticulation within the  renal sinus, and mild distal hydroureter. These findings are  nonspecific and may be due to distention and edema or fibrosis from  previous bouts of obstruction. However distal stricture, early or  partial obstruction with radiolucent calculus or edema secondary to  pyelonephritis etc. Is not excluded. Follow-up as clinically  warranted. The right collecting system appears unremarkable without  radiodense calculi or dilatation.      PANCREAS:  Marked parenchymal atrophy and fatty infiltration. Otherwise no  obvious mass considering limitations of an unenhanced scan, or  identified ductal dilatation.      GALLBLADDER:  No radiodense calculi, wall thickening or pericholecystic fluid.      BILE DUCTS:  There is no biliary dilatation or filling defects.          VESSELS:  Moderate-to-marked atherosclerotic calcifications are noted  predominantly at the aorta and iliac system.      PERITONEUM AND RETROPERITONEUM:  No ascites or free air, no fluid collection.      The retroperitoneum appears unremarkable, and without significant  adenopathy.      No enlarged mesenteric lymph nodes.      BOWEL:  Mild diverticulosis greatest at the distal descending and proximal  sigmoid colon. The bowel otherwise is unremarkable without  significant dilatation or mural thickening.      PELVIS:      BLADDER:  The urinary bladder contour is smooth.      REPRODUCTIVE ORGANS:  Status post hysterectomy.          BONE, ABDOMINAL WALL AND OTHER FINDINGS:  No suspicious osseous lesions are identified.      The abdominal wall soft tissues appear normal.      Impression: CHEST  1.  Lung findings as described, namely with tree-in-bud and  centrilobular nodularity of the right upper lobe.  Follow-up may be  warranted as given below.  2. Acute appearing fracture of the left humeral neck and proximal  shaft.      ABDOMEN - PELVIS  1.  Indeterminate left renal cortical nodularities, while probably  hemorrhagic cysts solid neoplasm is not excluded. Sonographic  correlation is therefore recommended.  2. Mild left hydronephrosis with reticulation of the renal sinus.  While nonspecific and may be residual from previous obstruction,  early or partial obstruction from distal stricture, radiolucent  calculus etc. Is possible, as is pyelonephritis.  3. Mild diverticulosis.  4. Marked pancreatic parenchymal atrophy and fatty infiltration or  lipomatosis.      MACRO:  1.      Signed by: Rome Ernst 6/7/2025 12:00 PM  Dictation workstation:   [id number]  CT cervical spine wo IV contrast  Narrative: Interpreted By:  Rome Ernst,   STUDY:  CT CERVICAL SPINE WO IV CONTRAST;  6/7/2025 11:28 am      INDICATION:  Fall and injury      COMPARISON:  06/06/2025      ACCESSION NUMBER(S):  [id number]      ORDERING CLINICIAN:  ANDREA GONSALES      TECHNIQUE:  Transaxial images with orthogonal reconstructions      FINDINGS:  VERTEBRAL ALIGNMENT:  Within normal limits.      CRANIOCERVICAL JUNCTION:  Unremarkable      BONY STRUCTURES:  No fracture or dislocation are evident.      THE CERVICAL LEVELS INCLUDING DISC SPACES, APOPHYSEAL AND  UNCOVERTEBRAL JOINTS:  Mild spondylosis, primarily with marked  narrowing of the C4-5 through C6-7 disc interspaces with mild  marginal osteophyte formation. There is mild multilevel apophyseal  hypertrophy, greatest on the left at the C4-5 level. This appears  similar to the prior exam..      ADDITIONAL FINDINGS:  None.      Impression: Mild spondylosis, otherwise without acute findings.      Signed by: Rome Ernst 6/7/2025 11:42 AM  Dictation workstation:   [id number]  CT head wo IV contrast  Narrative: Interpreted By:  Rome Ernst,   STUDY:  CT HEAD WO IV CONTRAST;  6/7/2025 11:28 am       INDICATION:  Signs/Symptoms:Fall.      COMPARISON:  06/06/2025      ACCESSION NUMBER(S):  [id number]      ORDERING CLINICIAN:  ANDREA GONSALES      TECHNIQUE:  Sequential trans axial images were obtained  .      FINDINGS:  INTRACRANIAL:      There is mild-to-moderate prominence of the cortical sulci indicating  atrophy.      There is mild ventriculomegaly, again consistent with atrophy.      Mild decreased attenuation of the periventricular and long tracks of  the white matter most consistent with gliosis from arterial disease.  There is no evidence of definite subacute infarction, intracranial  hemorrhage or mass.          EXTRACRANIAL:  Visualized paranasal sinuses and mastoids are clear.  The calvarium is intact.      Impression: Mild age related degenerative change as described without acute  findings or significant change from the prior exam.      Signed by: Rome Ernst 6/7/2025 11:41 AM  Dictation workstation:   [id number]  XR chest 1 view  Narrative: Interpreted By:  Schoenberger, Joseph,   STUDY:  XR CHEST 1 VIEW;  6/7/2025 11:05 am      INDICATION:  Signs/Symptoms:Fall.          COMPARISON:  None.      ACCESSION NUMBER(S):  [id number]      ORDERING CLINICIAN:  ANDREA GONSALES      FINDINGS:                  CARDIOMEDIASTINAL SILHOUETTE:  Cardiomediastinal silhouette is normal in size and configuration.      LUNGS:  There is a new linear opacity in the retrocardiac left lung base  likely an area of platelike atelectasis. No other definite focal lung  opacity. Chronic lung disease/COPD.      ABDOMEN:  No remarkable upper abdominal findings.      BONES:  Bones demineralized. Acute appearing possibly comminuted fracture of  the left humerus neck.      Impression: 1.  No evidence of acute cardiopulmonary process.  2. Osteopenia/osteoporosis. Acute appearing left humerus neck fracture              MACRO:  None      Signed by: Joseph Schoenberger 6/7/2025 11:26 AM  Dictation workstation:   [id number]  ECG 12  lead  Normal sinus rhythm  Possible Left atrial enlargement  Borderline ECG  When compared with ECG of 06-JUN-2025 17:33, (unconfirmed)  No significant change was found       Assessment/Plan    Generalized weakness   GI bleed unspecified  Chronic anemia  Left humeral fracture from 6/6  Malnourishment  MAKEDA on CKD stage IV    -Patient came to the hospital yesterday after a fall sustaining left humerus fracture and discharged home fu  Monday w/Ortho  -Patient weak today and returned to the ER hemoglobin 5.4  -Received 2 units in ER  -Imaging unremarkable with exception of age-indeterminate compression fracture L3-L5  -Consult GI  -Iron studies  -Patient has received iron infusion in the past  -Trend H&H  -Transfuse for <7  -Consult ortho  -Sling to left arm, ice and pain meds as needed  -Consult Nutrition  -IV fluids overnight  -PT/OT evaluation    CAD  HTN/HLD  PVD  Mitral regurg  OA  COPD  A-fib not on AC  -Resume home med      DVTp: Hold for bleeding    PLAN: Home with  versus SNF    DEBORAH Sung-CNP    Plan of care was discussed extensively with patient.  Patient verbalized understanding through teach back method.  All question and concerns addressed upon examination.    Of note, this documentation is completed using the Dragon Dictation system (voice recognition software). There may be spelling and/or grammatical errors that were not corrected prior to final submission.           [1]   Family History  Problem Relation Name Age of Onset    Stroke Father      Other (cerebrovascular accident) Father          cerebrovascular accident (CVA)    Coronary artery disease Father

## 2025-06-07 NOTE — CARE PLAN
The patient's goals for the shift include Labs WNL    The clinical goals for the shift include Labs WNL    Problem: Pain - Adult  Goal: Verbalizes/displays adequate comfort level or baseline comfort level  Outcome: Progressing     Problem: Safety - Adult  Goal: Free from fall injury  Outcome: Progressing     Problem: Discharge Planning  Goal: Discharge to home or other facility with appropriate resources  Outcome: Progressing     Problem: Chronic Conditions and Co-morbidities  Goal: Patient's chronic conditions and co-morbidity symptoms are monitored and maintained or improved  Outcome: Progressing     Problem: Nutrition  Goal: Nutrient intake appropriate for maintaining nutritional needs  Outcome: Progressing     Problem: Skin  Goal: Decreased wound size/increased tissue granulation at next dressing change  6/7/2025 1854 by Janet Cuevas RN  Outcome: Progressing  6/7/2025 1655 by Janet Cuevas RN  Flowsheets (Taken 6/7/2025 1655)  Decreased wound size/increased tissue granulation at next dressing change:   Utilize specialty bed per algorithm   Promote sleep for wound healing   Protective dressings over bony prominences  Goal: Participates in plan/prevention/treatment measures  6/7/2025 1854 by Janet Cuevas RN  Outcome: Progressing  6/7/2025 1655 by Janet Cuevas RN  Flowsheets (Taken 6/7/2025 1655)  Participates in plan/prevention/treatment measures:   Increase activity/out of bed for meals   Discuss with provider PT/OT consult   Elevate heels  Goal: Prevent/manage excess moisture  6/7/2025 1854 by Janet Cuevas RN  Outcome: Progressing  6/7/2025 1655 by Janet Cuevas RN  Flowsheets (Taken 6/7/2025 1655)  Prevent/manage excess moisture:   Use wicking fabric (obtain order)   Moisturize dry skin   Cleanse incontinence/protect with barrier cream   Monitor for/manage infection if present   Follow provider orders for dressing changes  Goal: Prevent/minimize sheer/friction  injuries  6/7/2025 1854 by Janet Cuevas RN  Outcome: Progressing  6/7/2025 1655 by Janet Cuevas RN  Flowsheets (Taken 6/7/2025 1655)  Prevent/minimize sheer/friction injuries:   Utilize specialty bed per algorithm   Use pull sheet   Turn/reposition every 2 hours/use positioning/transfer devices   Increase activity/out of bed for meals   HOB 30 degrees or less   Complete micro-shifts as needed if patient unable. Adjust patient position to relieve pressure points, not a full turn  Goal: Promote/optimize nutrition  6/7/2025 1854 by Janet Cuevas RN  Outcome: Progressing  6/7/2025 1655 by Janet Cuevas RN  Flowsheets (Taken 6/7/2025 1655)  Promote/optimize nutrition:   Offer water/supplements/favorite foods   Discuss with provider if NPO > 2 days   Assist with feeding   Reassess MST if dietician not consulted   Monitor/record intake including meals   Consume > 50% meals/supplements  Goal: Promote skin healing  6/7/2025 1854 by Janet Cuevas RN  Outcome: Progressing  6/7/2025 1655 by Janet Cuevas RN  Flowsheets (Taken 6/7/2025 1655)  Promote skin healing:   Turn/reposition every 2 hours/use positioning/transfer devices   Rotate device position/do not position patient on device   Protective dressings over bony prominences   Ensure correct size (line/device) and apply per  instructions   Assess skin/pad under line(s)/device(s)     Problem: Fall/Injury  Goal: Not fall by end of shift  Outcome: Progressing  Goal: Be free from injury by end of the shift  Outcome: Progressing  Goal: Verbalize understanding of personal risk factors for fall in the hospital  Outcome: Progressing  Goal: Verbalize understanding of risk factor reduction measures to prevent injury from fall in the home  Outcome: Progressing  Goal: Use assistive devices by end of the shift  Outcome: Progressing  Goal: Pace activities to prevent fatigue by end of the shift  Outcome: Progressing     Problem: Pain  Goal:  Takes deep breaths with improved pain control throughout the shift  Outcome: Progressing  Goal: Turns in bed with improved pain control throughout the shift  Outcome: Progressing  Goal: Walks with improved pain control throughout the shift  Outcome: Progressing  Goal: Performs ADL's with improved pain control throughout shift  Outcome: Progressing  Goal: Participates in PT with improved pain control throughout the shift  Outcome: Progressing  Goal: Free from opioid side effects throughout the shift  Outcome: Progressing  Goal: Free from acute confusion related to pain meds throughout the shift  Outcome: Progressing

## 2025-06-07 NOTE — ED PROVIDER NOTES
HPI   Chief Complaint   Patient presents with    Fall       An 89-year-old female patient is brought in the emergency department today by EMS secondary to a fall.  Per report patient had a fall yesterday broke her left shoulder.  Patient states she fell again this morning when she tripped over a brick.  Denies any pain at this present time.  Virtually has no complaints.  Patient was brought to emergency department today for evaluation after trip and fall.              Patient History   Medical History[1]  Surgical History[2]  Family History[3]  Social History[4]    Physical Exam   ED Triage Vitals   Temperature Heart Rate Respirations BP   06/07/25 1046 06/07/25 1043 06/07/25 1043 06/07/25 1043   36.5 °C (97.7 °F) 96 19 137/70      Pulse Ox Temp Source Heart Rate Source Patient Position   06/07/25 1043 06/07/25 1046 -- --   99 % Temporal        BP Location FiO2 (%)     -- --             Physical Exam  Constitutional:       General: She is not in acute distress.     Appearance: Normal appearance. She is not ill-appearing or diaphoretic.   HENT:      Head: Normocephalic and atraumatic.      Nose: Nose normal.   Eyes:      Extraocular Movements: Extraocular movements intact.      Conjunctiva/sclera: Conjunctivae normal.      Pupils: Pupils are equal, round, and reactive to light.   Cardiovascular:      Rate and Rhythm: Normal rate and regular rhythm.   Pulmonary:      Effort: Pulmonary effort is normal. No respiratory distress.      Breath sounds: Normal breath sounds. No stridor. No wheezing.   Abdominal:      Tenderness: There is no abdominal tenderness. There is no guarding or rebound.   Musculoskeletal:         General: No tenderness (No spinal tenderness, tenderness over the hips, pelvis, extremities other than left shoulder). Normal range of motion.      Cervical back: Normal range of motion.   Skin:     General: Skin is warm and dry.   Neurological:      General: No focal deficit present.      Mental Status: She  is alert and oriented to person, place, and time. Mental status is at baseline.   Psychiatric:         Mood and Affect: Mood normal.           ED Course & MDM   Diagnoses as of 06/07/25 1330   Frequent falls   Anemia, unspecified type   Gastrointestinal hemorrhage, unspecified gastrointestinal hemorrhage type   Acute on chronic renal insufficiency                 No data recorded     [place] Coma Scale Score: 14 (06/07/25 1045 : Mary Jasmine RN)                           Medical Decision Making  An 89-year-old female patient is brought in the emergency department today by EMS secondary to a fall.  Per report patient had a fall yesterday broke her left shoulder.  Patient states she fell again this morning when she tripped over a brick.  Denies any pain at this present time.  Virtually has no complaints.  Patient was brought to emergency department today for evaluation after trip and fall.    This patient has had multiple falls now ordered EKG, chest x-ray, laboratory studies as well as pan scans to rule out any other acute bony abnormalities, fractures.  Offered patient medication pain patient declines.    Patient ANO x 2 and unsure if this is patient's baseline as there is no family here with the patient at this time    EKG: My EKG interpretation, 89 bpm, normal sinus rhythm, no ST elevations, T wave abnormalities, no STEMI    Patient's hemoglobin 5.6 hematocrit 18.1.  Patient is chronically anemic typically runs in the eights.  In discussing with patient and patient's  patient has had intermittent dark stools with history of anemia.  Patient and patient's  consented for blood transfusion plan to admit patient secondary to acute on chronic anemia, GI bleed.  Patient's  troponin at 26.  Patient's creatinine 2.57 GFR 17 which is worsening for the patient.    CT study of the head is negative, CT C-spine negative, chest x-ray is negative for any acute findings other than patient's left humeral neck  fracture which is from previous fall yesterday.    I discussed case with the hospitalist ELVIRA who agrees to meet the patient under Dr. Jean    Historian is the patient    Diagnosis: Frequent falls, anemia, GI bleed, acute on chronic renal sufficiency        Labs Reviewed   CBC WITH AUTO DIFFERENTIAL - Abnormal       Result Value    WBC 5.5      nRBC 0.0      RBC 1.59 (*)     Hemoglobin 5.6 (*)     Hematocrit 18.1 (*)      (*)     MCH 35.2 (*)     MCHC 30.9 (*)     RDW 13.9      Platelets 174      Neutrophils % 72.1      Immature Granulocytes %, Automated 0.5      Lymphocytes % 14.5      Monocytes % 9.6      Eosinophils % 3.1      Basophils % 0.2      Neutrophils Absolute 3.97      Immature Granulocytes Absolute, Automated 0.03      Lymphocytes Absolute 0.80      Monocytes Absolute 0.53      Eosinophils Absolute 0.17      Basophils Absolute 0.01     COMPREHENSIVE METABOLIC PANEL - Abnormal    Glucose 110 (*)     Sodium 138      Potassium 5.2      Chloride 108 (*)     Bicarbonate 18 (*)     Anion Gap 17      Urea Nitrogen 53 (*)     Creatinine 2.57 (*)     eGFR 17 (*)     Calcium 8.4 (*)     Albumin 2.9 (*)     Alkaline Phosphatase 85      Total Protein 8.8 (*)     AST 10      Bilirubin, Total 0.3      ALT 8     PROTIME-INR - Abnormal    Protime 14.0 (*)     INR 1.3 (*)    B-TYPE NATRIURETIC PEPTIDE - Abnormal     (*)     Narrative:        <100 pg/mL - Heart failure unlikely  100-299 pg/mL - Intermediate probability of acute heart                  failure exacerbation. Correlate with clinical                  context and patient history.    >=300 pg/mL - Heart Failure likely. Correlate with clinical                  context and patient history.    BNP testing is performed using different testing methodology at [facility] than at other [facility] hospitals. Direct result comparisons should only be made within the same method.      SERIAL TROPONIN-INITIAL - Abnormal    Troponin I, High Sensitivity  26 (*)     Narrative:     Less than 99th percentile of normal range cutoff-  Female and children under 18 years old <14 ng/L; Male <21 ng/L: Negative  Repeat testing should be performed if clinically indicated.     Female and children under 18 years old 14-50 ng/L; Male 21-50 ng/L:  Consistent with possible cardiac damage and possible increased clinical   risk. Serial measurements may help to assess extent of myocardial damage.     >50 ng/L: Consistent with cardiac damage, increased clinical risk and  myocardial infarction. Serial measurements may help assess extent of   myocardial damage.      NOTE: Children less than 1 year old may have higher baseline troponin   levels and results should be interpreted in conjunction with the overall   clinical context.     NOTE: Troponin I testing is performed using a different   testing methodology at [facility] than at other   [facility]. Direct result comparisons should only   be made within the same method.   SERIAL TROPONIN, 1 HOUR - Abnormal    Troponin I, High Sensitivity 23 (*)     Narrative:     Less than 99th percentile of normal range cutoff-  Female and children under 18 years old <14 ng/L; Male <21 ng/L: Negative  Repeat testing should be performed if clinically indicated.     Female and children under 18 years old 14-50 ng/L; Male 21-50 ng/L:  Consistent with possible cardiac damage and possible increased clinical   risk. Serial measurements may help to assess extent of myocardial damage.     >50 ng/L: Consistent with cardiac damage, increased clinical risk and  myocardial infarction. Serial measurements may help assess extent of   myocardial damage.      NOTE: Children less than 1 year old may have higher baseline troponin   levels and results should be interpreted in conjunction with the overall   clinical context.     NOTE: Troponin I testing is performed using a different   testing methodology at [facility] than at other    [facility]. Direct result comparisons should only   be made within the same method.   MAGNESIUM - Normal    Magnesium 1.87     LACTATE - Normal    Lactate 0.6      Narrative:     Venipuncture immediately after or during the administration of Metamizole may lead to falsely low results. Testing should be performed immediately prior to Metamizole dosing.   TROPONIN SERIES- (INITIAL, 1 HR)    Narrative:     The following orders were created for panel order Troponin I Series, High Sensitivity (0, 1 HR).  Procedure                               Abnormality         Status                     ---------                               -----------         ------                     Troponin I, High Sensiti...[[id number]]  Abnormal            Final result               Troponin, High Sensitivi...[[id number]]  Abnormal            Final result                 Please view results for these tests on the individual orders.   TYPE AND SCREEN    ABO TYPE A      Rh TYPE POS      ANTIBODY SCREEN NEG     URINALYSIS WITH REFLEX CULTURE AND MICROSCOPIC    Narrative:     The following orders were created for panel order Urinalysis with Reflex Culture and Microscopic.  Procedure                               Abnormality         Status                     ---------                               -----------         ------                     Urinalysis with Reflex C...[[id number]]                                                 Extra Urine Gray Tube[[id number]]                                                         Please view results for these tests on the individual orders.   URINALYSIS WITH REFLEX CULTURE AND MICROSCOPIC   EXTRA URINE GRAY TUBE   PREPARE RBC    PRODUCT CODE [id number]      Unit Number [id number]      Unit ABO A      Unit RH POS      XM INTEP COMP      Dispense Status IS      Blood Expiration Date 6/12/2025 11:59:00 PM EDT      PRODUCT BLOOD TYPE 6200      UNIT VOLUME 350     PATH REVIEW-CBC DIFFERENTIAL        CT  chest abdomen pelvis wo IV contrast   Final Result   CHEST   1.  Lung findings as described, namely with tree-in-bud and   centrilobular nodularity of the right upper lobe. Follow-up may be   warranted as given below.   2. Acute appearing fracture of the left humeral neck and proximal   shaft.        ABDOMEN - PELVIS   1.  Indeterminate left renal cortical nodularities, while probably   hemorrhagic cysts solid neoplasm is not excluded. Sonographic   correlation is therefore recommended.   2. Mild left hydronephrosis with reticulation of the renal sinus.   While nonspecific and may be residual from previous obstruction,   early or partial obstruction from distal stricture, radiolucent   calculus etc. Is possible, as is pyelonephritis.   3. Mild diverticulosis.   4. Marked pancreatic parenchymal atrophy and fatty infiltration or   lipomatosis.        MACRO:   1.        Signed by: Rome Ernst 6/7/2025 12:00 PM   Dictation workstation:   [id number]      CT lumbar spine retrospective reconstruction protocol   Final Result   L3-L5 vertebral body compression fractures are age-indeterminate in   the absence of comparison exams. While favored remote an MRI would be   required for confirmation.             No significant lumbar bony spinal canal or foraminal stenosis.        MACRO:   None        Signed by: Christian Allen 6/7/2025 12:37 PM   Dictation workstation:   [id number]      CT thoracic spine retrospective reconstruction protocol   Final Result   No evidence of acute fracture or traumatic malalignment of the   thoracic spine.        Mild degenerative disc and facet disease without significant bony   foraminal or canal stenosis.        MACRO:   None        Signed by: Christian Allen 6/7/2025 12:18 PM   Dictation workstation:   [id number]      CT head wo IV contrast   Final Result   Mild age related degenerative change as described without acute   findings or significant change from the prior exam.        Signed by:  Rome Ernst 6/7/2025 11:41 AM   Dictation workstation:   [id number]      CT cervical spine wo IV contrast   Final Result   Mild spondylosis, otherwise without acute findings.        Signed by: Rome Ernst 6/7/2025 11:42 AM   Dictation workstation:   [id number]      XR chest 1 view   Final Result   1.  No evidence of acute cardiopulmonary process.   2. Osteopenia/osteoporosis. Acute appearing left humerus neck fracture                  MACRO:   None        Signed by: Joseph Schoenberger 6/7/2025 11:26 AM   Dictation workstation:   [id number]            Procedure  Critical Care    Performed by: Derik Hawk PA-C  Authorized by: Derik Hawk PA-C    Critical care provider statement:     Critical care time (minutes):  30    Critical care time was exclusive of:  Separately billable procedures and treating other patients    Critical care was necessary to treat or prevent imminent or life-threatening deterioration of the following conditions: Anemia/blood transfusion.    Critical care was time spent personally by me on the following activities:  Examination of patient, ordering and performing treatments and interventions, ordering and review of laboratory studies and ordering and review of radiographic studies    Care discussed with: admitting provider             [1]   Past Medical History:  Diagnosis Date    COPD (chronic obstructive pulmonary disease) (Multi)     High blood cholesterol     Hypertension     Personal history of other diseases of the circulatory system 07/29/2022    History of aortic valve stenosis   [2]   Past Surgical History:  Procedure Laterality Date    CARDIAC CATHETERIZATION Left     CATARACT EXTRACTION Right     OTHER SURGICAL HISTORY  04/29/2019    Hysterectomy    OTHER SURGICAL HISTORY  09/25/2021    Colonoscopy    OTHER SURGICAL HISTORY  09/25/2021    Dilation and curettage    OTHER SURGICAL HISTORY  09/25/2021    Esophagogastroduodenoscopy    OTHER SURGICAL HISTORY  10/20/2021     Transcatheter aortic valve replacement   [3]   Family History  Problem Relation Name Age of Onset    Stroke Father      Other (cerebrovascular accident) Father          cerebrovascular accident (CVA)    Coronary artery disease Father     [4]   Social History  Tobacco Use    Smoking status: Former     Current packs/day: 0.00     Types: Cigarettes     Start date:      Quit date:      Years since quittin.4     Passive exposure: Never    Smokeless tobacco: Never   Vaping Use    Vaping status: Never Used   Substance Use Topics    Alcohol use: Never    Drug use: Never        Derik Hawk PA-C  25 1337       required for confirmation.             No significant lumbar bony spinal canal or foraminal stenosis.        MACRO:   None        Signed by: Christian Allen 6/7/2025 12:37 PM   Dictation workstation:   [id number]      CT thoracic spine retrospective reconstruction protocol   Final Result   No evidence of acute fracture or traumatic malalignment of the   thoracic spine.        Mild degenerative disc and facet disease without significant bony   foraminal or canal stenosis.        MACRO:   None        Signed by: Christian Allen 6/7/2025 12:18 PM   Dictation workstation:   [id number]      CT head wo IV contrast   Final Result   Mild age related degenerative change as described without acute   findings or significant change from the prior exam.        Signed by: Rome Ernst 6/7/2025 11:41 AM   Dictation workstation:   [id number]      CT cervical spine wo IV contrast   Final Result   Mild spondylosis, otherwise without acute findings.        Signed by: Rome Ernst 6/7/2025 11:42 AM   Dictation workstation:   [id number]      XR chest 1 view   Final Result   1.  No evidence of acute cardiopulmonary process.   2. Osteopenia/osteoporosis. Acute appearing left humerus neck fracture                  MACRO:   None        Signed by: Joseph Schoenberger 6/7/2025 11:26 AM   Dictation workstation:   [id number]            Procedure  Critical Care    Performed by: Derik Hawk PA-C  Authorized by: Derik Hawk PA-C    Critical care provider statement:     Critical care time (minutes):  30    Critical care time was exclusive of:  Separately billable procedures and treating other patients    Critical care was necessary to treat or prevent imminent or life-threatening deterioration of the following conditions: Anemia/blood transfusion.    Critical care was time spent personally by me on the following activities:  Examination of patient, ordering and performing treatments and interventions, ordering and review of laboratory  studies and ordering and review of radiographic studies    Care discussed with: admitting provider             Derik Hawk PA-C  25 1330       [1]   Past Medical History:  Diagnosis Date    COPD (chronic obstructive pulmonary disease) (Multi)     High blood cholesterol     Hypertension     Personal history of other diseases of the circulatory system 2022    History of aortic valve stenosis   [2]   Past Surgical History:  Procedure Laterality Date    CARDIAC CATHETERIZATION Left     CATARACT EXTRACTION Right     OTHER SURGICAL HISTORY  2019    Hysterectomy    OTHER SURGICAL HISTORY  2021    Colonoscopy    OTHER SURGICAL HISTORY  2021    Dilation and curettage    OTHER SURGICAL HISTORY  2021    Esophagogastroduodenoscopy    OTHER SURGICAL HISTORY  10/20/2021    Transcatheter aortic valve replacement   [3]   Family History  Problem Relation Name Age of Onset    Stroke Father      Other (cerebrovascular accident) Father          cerebrovascular accident (CVA)    Coronary artery disease Father     [4]   Social History  Tobacco Use    Smoking status: Former     Current packs/day: 0.00     Types: Cigarettes     Start date:      Quit date:      Years since quittin.5     Passive exposure: Never    Smokeless tobacco: Never   Vaping Use    Vaping status: Never Used   Substance Use Topics    Alcohol use: Never    Drug use: Never        Derik Hawk PA-C  25 1918

## 2025-06-08 ENCOUNTER — APPOINTMENT (OUTPATIENT)
Dept: CARDIOLOGY | Facility: HOSPITAL | Age: 89
DRG: 377 | End: 2025-06-08
Payer: MEDICARE

## 2025-06-08 VITALS
OXYGEN SATURATION: 98 % | HEART RATE: 125 BPM | TEMPERATURE: 97.5 F | RESPIRATION RATE: 16 BRPM | SYSTOLIC BLOOD PRESSURE: 128 MMHG | BODY MASS INDEX: 15.36 KG/M2 | DIASTOLIC BLOOD PRESSURE: 58 MMHG | WEIGHT: 97.88 LBS | HEIGHT: 67 IN

## 2025-06-08 LAB
ANION GAP SERPL CALC-SCNC: 16 MMOL/L (ref 10–20)
ATRIAL RATE: 100 BPM
BUN SERPL-MCNC: 48 MG/DL (ref 6–23)
CALCIUM SERPL-MCNC: 8 MG/DL (ref 8.6–10.3)
CHLORIDE SERPL-SCNC: 112 MMOL/L (ref 98–107)
CO2 SERPL-SCNC: 16 MMOL/L (ref 21–32)
CREAT SERPL-MCNC: 2.37 MG/DL (ref 0.5–1.05)
EGFRCR SERPLBLD CKD-EPI 2021: 19 ML/MIN/1.73M*2
ERYTHROCYTE [DISTWIDTH] IN BLOOD BY AUTOMATED COUNT: 22.3 % (ref 11.5–14.5)
GLUCOSE SERPL-MCNC: 94 MG/DL (ref 74–99)
HCT VFR BLD AUTO: 28.6 % (ref 36–46)
HGB BLD-MCNC: 9.1 G/DL (ref 12–16)
HOLD SPECIMEN: NORMAL
LEGIONELLA AG UR QL: NEGATIVE
MAGNESIUM SERPL-MCNC: 1.78 MG/DL (ref 1.6–2.4)
MCH RBC QN AUTO: 32.9 PG (ref 26–34)
MCHC RBC AUTO-ENTMCNC: 31.8 G/DL (ref 32–36)
MCV RBC AUTO: 103 FL (ref 80–100)
NRBC BLD-RTO: 0 /100 WBCS (ref 0–0)
P AXIS: 78 DEGREES
P OFFSET: 210 MS
P ONSET: 143 MS
PLATELET # BLD AUTO: 160 X10*3/UL (ref 150–450)
POTASSIUM SERPL-SCNC: 5.5 MMOL/L (ref 3.5–5.3)
PR INTERVAL: 152 MS
Q ONSET: 219 MS
QRS COUNT: 17 BEATS
QRS DURATION: 86 MS
QT INTERVAL: 338 MS
QTC CALCULATION(BAZETT): 436 MS
QTC FREDERICIA: 401 MS
R AXIS: 61 DEGREES
RBC # BLD AUTO: 2.77 X10*6/UL (ref 4–5.2)
S PNEUM AG UR QL: NEGATIVE
SODIUM SERPL-SCNC: 138 MMOL/L (ref 136–145)
T AXIS: 69 DEGREES
T OFFSET: 388 MS
VENTRICULAR RATE: 100 BPM
WBC # BLD AUTO: 6.1 X10*3/UL (ref 4.4–11.3)

## 2025-06-08 PROCEDURE — 99223 1ST HOSP IP/OBS HIGH 75: CPT | Performed by: STUDENT IN AN ORGANIZED HEALTH CARE EDUCATION/TRAINING PROGRAM

## 2025-06-08 PROCEDURE — 2500000004 HC RX 250 GENERAL PHARMACY W/ HCPCS (ALT 636 FOR OP/ED): Performed by: NURSE PRACTITIONER

## 2025-06-08 PROCEDURE — 36415 COLL VENOUS BLD VENIPUNCTURE: CPT

## 2025-06-08 PROCEDURE — 2500000001 HC RX 250 WO HCPCS SELF ADMINISTERED DRUGS (ALT 637 FOR MEDICARE OP): Performed by: STUDENT IN AN ORGANIZED HEALTH CARE EDUCATION/TRAINING PROGRAM

## 2025-06-08 PROCEDURE — 80048 BASIC METABOLIC PNL TOTAL CA: CPT

## 2025-06-08 PROCEDURE — 2500000004 HC RX 250 GENERAL PHARMACY W/ HCPCS (ALT 636 FOR OP/ED): Performed by: STUDENT IN AN ORGANIZED HEALTH CARE EDUCATION/TRAINING PROGRAM

## 2025-06-08 PROCEDURE — 2500000005 HC RX 250 GENERAL PHARMACY W/O HCPCS: Performed by: NURSE PRACTITIONER

## 2025-06-08 PROCEDURE — 99232 SBSQ HOSP IP/OBS MODERATE 35: CPT

## 2025-06-08 PROCEDURE — 1200000002 HC GENERAL ROOM WITH TELEMETRY DAILY

## 2025-06-08 PROCEDURE — 93005 ELECTROCARDIOGRAM TRACING: CPT

## 2025-06-08 PROCEDURE — 2500000004 HC RX 250 GENERAL PHARMACY W/ HCPCS (ALT 636 FOR OP/ED)

## 2025-06-08 PROCEDURE — 83735 ASSAY OF MAGNESIUM: CPT

## 2025-06-08 PROCEDURE — 23600 CLTX PROX HUMRL FX W/O MNPJ: CPT | Performed by: ORTHOPAEDIC SURGERY

## 2025-06-08 PROCEDURE — 99221 1ST HOSP IP/OBS SF/LOW 40: CPT | Performed by: ORTHOPAEDIC SURGERY

## 2025-06-08 PROCEDURE — 85027 COMPLETE CBC AUTOMATED: CPT

## 2025-06-08 RX ORDER — LIDOCAINE 560 MG/1
1 PATCH PERCUTANEOUS; TOPICAL; TRANSDERMAL DAILY
Status: DISPENSED | OUTPATIENT
Start: 2025-06-08

## 2025-06-08 RX ORDER — TIZANIDINE 4 MG/1
2 TABLET ORAL ONCE
Status: DISCONTINUED | OUTPATIENT
Start: 2025-06-08 | End: 2025-06-08

## 2025-06-08 RX ORDER — TIZANIDINE 2 MG/1
1 TABLET ORAL ONCE
Status: DISCONTINUED | OUTPATIENT
Start: 2025-06-08 | End: 2025-06-08

## 2025-06-08 RX ORDER — OLANZAPINE 10 MG/2ML
5 INJECTION, POWDER, FOR SOLUTION INTRAMUSCULAR ONCE AS NEEDED
Status: COMPLETED | OUTPATIENT
Start: 2025-06-08 | End: 2025-06-08

## 2025-06-08 RX ORDER — SODIUM CHLORIDE 9 MG/ML
80 INJECTION, SOLUTION INTRAVENOUS CONTINUOUS
Status: ACTIVE | OUTPATIENT
Start: 2025-06-08 | End: 2025-06-09

## 2025-06-08 RX ORDER — CEFTRIAXONE 1 G/50ML
1 INJECTION, SOLUTION INTRAVENOUS EVERY 24 HOURS
Status: DISPENSED | OUTPATIENT
Start: 2025-06-08

## 2025-06-08 RX ORDER — OLANZAPINE 10 MG/2ML
5 INJECTION, POWDER, FOR SOLUTION INTRAMUSCULAR ONCE
Status: COMPLETED | OUTPATIENT
Start: 2025-06-08 | End: 2025-06-08

## 2025-06-08 RX ADMIN — SODIUM CHLORIDE 80 ML/HR: 0.9 INJECTION, SOLUTION INTRAVENOUS at 23:29

## 2025-06-08 RX ADMIN — MORPHINE SULFATE 2 MG: 2 INJECTION, SOLUTION INTRAMUSCULAR; INTRAVENOUS at 22:32

## 2025-06-08 RX ADMIN — MORPHINE SULFATE 2 MG: 2 INJECTION, SOLUTION INTRAMUSCULAR; INTRAVENOUS at 11:00

## 2025-06-08 RX ADMIN — MORPHINE SULFATE 2 MG: 2 INJECTION, SOLUTION INTRAMUSCULAR; INTRAVENOUS at 06:31

## 2025-06-08 RX ADMIN — OLANZAPINE 5 MG: 10 INJECTION, POWDER, FOR SOLUTION INTRAMUSCULAR at 02:38

## 2025-06-08 RX ADMIN — OLANZAPINE 5 MG: 10 INJECTION, POWDER, FOR SOLUTION INTRAMUSCULAR at 08:01

## 2025-06-08 RX ADMIN — MONTELUKAST SODIUM 10 MG: 10 TABLET, FILM COATED ORAL at 08:01

## 2025-06-08 RX ADMIN — MORPHINE SULFATE 2 MG: 2 INJECTION, SOLUTION INTRAMUSCULAR; INTRAVENOUS at 16:45

## 2025-06-08 RX ADMIN — LIDOCAINE 1 PATCH: 4 PATCH TOPICAL at 01:02

## 2025-06-08 RX ADMIN — CEFTRIAXONE 1 G: 1 INJECTION, SOLUTION INTRAVENOUS at 10:26

## 2025-06-08 RX ADMIN — ONDANSETRON 4 MG: 2 INJECTION INTRAMUSCULAR; INTRAVENOUS at 11:00

## 2025-06-08 ASSESSMENT — COGNITIVE AND FUNCTIONAL STATUS - GENERAL
MOBILITY SCORE: 15
EATING MEALS: A LOT
MOVING TO AND FROM BED TO CHAIR: A LOT
CLIMB 3 TO 5 STEPS WITH RAILING: A LOT
EATING MEALS: A LITTLE
PERSONAL GROOMING: A LOT
PERSONAL GROOMING: A LOT
STANDING UP FROM CHAIR USING ARMS: A LOT
DAILY ACTIVITIY SCORE: 9
WALKING IN HOSPITAL ROOM: A LOT
HELP NEEDED FOR BATHING: TOTAL
TOILETING: TOTAL
WALKING IN HOSPITAL ROOM: A LOT
TURNING FROM BACK TO SIDE WHILE IN FLAT BAD: A LOT
DAILY ACTIVITIY SCORE: 8
DAILY ACTIVITIY SCORE: 8
TOILETING: TOTAL
HELP NEEDED FOR BATHING: TOTAL
EATING MEALS: A LOT
STANDING UP FROM CHAIR USING ARMS: A LOT
MOVING TO AND FROM BED TO CHAIR: A LOT
CLIMB 3 TO 5 STEPS WITH RAILING: A LOT
MOVING FROM LYING ON BACK TO SITTING ON SIDE OF FLAT BED WITH BEDRAILS: A LOT
HELP NEEDED FOR BATHING: TOTAL
PERSONAL GROOMING: A LOT
MOBILITY SCORE: 12
MOVING FROM LYING ON BACK TO SITTING ON SIDE OF FLAT BED WITH BEDRAILS: A LOT
MOBILITY SCORE: 12
CLIMB 3 TO 5 STEPS WITH RAILING: A LOT
DRESSING REGULAR UPPER BODY CLOTHING: TOTAL
TOILETING: TOTAL
DRESSING REGULAR LOWER BODY CLOTHING: TOTAL
MOVING TO AND FROM BED TO CHAIR: A LOT
DRESSING REGULAR UPPER BODY CLOTHING: TOTAL
WALKING IN HOSPITAL ROOM: A LOT
DRESSING REGULAR UPPER BODY CLOTHING: TOTAL
DRESSING REGULAR LOWER BODY CLOTHING: TOTAL
DRESSING REGULAR LOWER BODY CLOTHING: TOTAL
STANDING UP FROM CHAIR USING ARMS: A LOT
TURNING FROM BACK TO SIDE WHILE IN FLAT BAD: A LITTLE
TURNING FROM BACK TO SIDE WHILE IN FLAT BAD: A LOT

## 2025-06-08 ASSESSMENT — PAIN - FUNCTIONAL ASSESSMENT
PAIN_FUNCTIONAL_ASSESSMENT: 0-10
PAIN_FUNCTIONAL_ASSESSMENT: 0-10
PAIN_FUNCTIONAL_ASSESSMENT: FLACC (FACE, LEGS, ACTIVITY, CRY, CONSOLABILITY)
PAIN_FUNCTIONAL_ASSESSMENT: 0-10

## 2025-06-08 ASSESSMENT — PAIN SCALES - GENERAL
PAINLEVEL_OUTOF10: 9
PAINLEVEL_OUTOF10: 2
PAINLEVEL_OUTOF10: 9
PAINLEVEL_OUTOF10: 0 - NO PAIN
PAINLEVEL_OUTOF10: 0 - NO PAIN
PAINLEVEL_OUTOF10: 3

## 2025-06-08 ASSESSMENT — PAIN DESCRIPTION - DESCRIPTORS
DESCRIPTORS: ACHING;DISCOMFORT;SORE;TENDER
DESCRIPTORS: DISCOMFORT;PRESSURE;SORE;TENDER

## 2025-06-08 NOTE — CARE PLAN
The patient's goals for the shift include Labs WNL    The clinical goals for the shift include Labs WNL      Problem: Pain - Adult  Goal: Verbalizes/displays adequate comfort level or baseline comfort level  Outcome: Progressing     Problem: Safety - Adult  Goal: Free from fall injury  Outcome: Progressing     Problem: Discharge Planning  Goal: Discharge to home or other facility with appropriate resources  Outcome: Progressing     Problem: Chronic Conditions and Co-morbidities  Goal: Patient's chronic conditions and co-morbidity symptoms are monitored and maintained or improved  Outcome: Progressing     Problem: Nutrition  Goal: Nutrient intake appropriate for maintaining nutritional needs  Outcome: Progressing     Problem: Skin  Goal: Decreased wound size/increased tissue granulation at next dressing change  6/8/2025 1845 by Janet Cuevas RN  Outcome: Progressing  6/8/2025 0754 by Janet Cuevas RN  Flowsheets (Taken 6/8/2025 0754)  Decreased wound size/increased tissue granulation at next dressing change:   Utilize specialty bed per algorithm   Promote sleep for wound healing   Protective dressings over bony prominences  Goal: Participates in plan/prevention/treatment measures  6/8/2025 1845 by Janet Cuevas RN  Outcome: Progressing  6/8/2025 0754 by Janet Cuevas RN  Flowsheets (Taken 6/8/2025 0754)  Participates in plan/prevention/treatment measures:   Discuss with provider PT/OT consult   Elevate heels   Increase activity/out of bed for meals  Goal: Prevent/manage excess moisture  6/8/2025 1845 by Janet Cuevas RN  Outcome: Progressing  6/8/2025 0754 by Janet Cuevas RN  Flowsheets (Taken 6/8/2025 0754)  Prevent/manage excess moisture:   Use wicking fabric (obtain order)   Moisturize dry skin   Cleanse incontinence/protect with barrier cream   Monitor for/manage infection if present   Follow provider orders for dressing changes  Goal: Prevent/minimize sheer/friction  injuries  6/8/2025 1845 by Janet Cuevas RN  Outcome: Progressing  6/8/2025 0754 by aJnet Cuevas RN  Flowsheets (Taken 6/8/2025 0754)  Prevent/minimize sheer/friction injuries:   Utilize specialty bed per algorithm   Use pull sheet   Turn/reposition every 2 hours/use positioning/transfer devices   Increase activity/out of bed for meals   HOB 30 degrees or less   Complete micro-shifts as needed if patient unable. Adjust patient position to relieve pressure points, not a full turn  Goal: Promote/optimize nutrition  6/8/2025 1845 by Janet Cuevas RN  Outcome: Progressing  6/8/2025 0754 by Janet Cuevas RN  Flowsheets (Taken 6/8/2025 0754)  Promote/optimize nutrition:   Offer water/supplements/favorite foods   Discuss with provider if NPO > 2 days   Assist with feeding   Reassess MST if dietician not consulted   Monitor/record intake including meals   Consume > 50% meals/supplements  Goal: Promote skin healing  6/8/2025 1845 by Janet Cuevas RN  Outcome: Progressing  6/8/2025 0754 by Janet Cuevas RN  Flowsheets (Taken 6/8/2025 0754)  Promote skin healing:   Turn/reposition every 2 hours/use positioning/transfer devices   Rotate device position/do not position patient on device   Protective dressings over bony prominences   Ensure correct size (line/device) and apply per  instructions   Assess skin/pad under line(s)/device(s)     Problem: Fall/Injury  Goal: Not fall by end of shift  Outcome: Progressing  Goal: Be free from injury by end of the shift  Outcome: Progressing  Goal: Verbalize understanding of personal risk factors for fall in the hospital  Outcome: Progressing  Goal: Verbalize understanding of risk factor reduction measures to prevent injury from fall in the home  Outcome: Progressing  Goal: Use assistive devices by end of the shift  Outcome: Progressing  Goal: Pace activities to prevent fatigue by end of the shift  Outcome: Progressing     Problem: Pain  Goal:  Takes deep breaths with improved pain control throughout the shift  Outcome: Progressing  Goal: Turns in bed with improved pain control throughout the shift  Outcome: Progressing  Goal: Walks with improved pain control throughout the shift  Outcome: Progressing  Goal: Performs ADL's with improved pain control throughout shift  Outcome: Progressing  Goal: Participates in PT with improved pain control throughout the shift  Outcome: Progressing  Goal: Free from opioid side effects throughout the shift  Outcome: Progressing  Goal: Free from acute confusion related to pain meds throughout the shift  Outcome: Progressing

## 2025-06-08 NOTE — CONSULTS
Reason for Consult: Acute on chronic anemia.    History of Present Illness:   Bora Moreno is a 89 y.o. female with a PMH of CAD, PVD, CKD IV, HTN, HLD, TAVR, cachexia, COPD, former tobacco use, and Afib (not on AC) who presented the hospital with weakness after recent ER visit fall c/b left humerus fracture and was found to be anemic in whom we are consulted for further management.  As patient is lethargic after receiving Zyprexa, she is unable to participate in ROS and HPI.  She had a similar admission in October 2024 (+ rectal bleeding).  She adamantly refused scopes at that time.  I called and spoke with the , who states that his wife does not want endoscopic evaluation.  However, he will talk to her when she is more awake to confirm this.  Patient with iron deficiency anemia with hemoglobin 5.6 --> 9.2 s/p 2u PRBCs.  There is no concerns for overt GI bleeding.    Review of Systems  ROS Negative unless otherwise stated above.    Past Medical History   has a past medical history of COPD (chronic obstructive pulmonary disease) (Multi), High blood cholesterol, Hypertension, and Personal history of other diseases of the circulatory system (07/29/2022).     Social History   reports that she quit smoking about 54 years ago. Her smoking use included cigarettes. She started smoking about 69 years ago. She has never been exposed to tobacco smoke. She has never used smokeless tobacco. She reports that she does not drink alcohol and does not use drugs.     Family History  family history includes Coronary artery disease in her father; Stroke in her father; cerebrovascular accident in her father.     Allergies  RX Allergies[1]    Medications  Current Outpatient Medications   Medication Instructions    aspirin 81 mg, Daily    atorvastatin (LIPITOR) 20 mg, oral, Nightly    donepezil (ARICEPT) 10 mg, oral, Nightly    fluticasone furoate-vilanteroL (Breo Ellipta) 200-25 mcg/dose inhaler inhalation, Daily     HYDROcodone-acetaminophen (Norco) 5-325 mg tablet 1 tablet, oral, Every 4 hours PRN    memantine (NAMENDA) 5 mg, oral, 2 times daily    montelukast (SINGULAIR) 10 mg, oral, Daily    valsartan-hydrochlorothiazide (Diovan-HCT) 80-12.5 mg tablet 1 tablet, oral, Daily        Objective   Visit Vitals  /65   Pulse 98   Temp 37 °C (98.6 °F)   Resp 18        General: Lethargic (received Zyprexa), and thus left alone to rest. NAD.   HEENT: AT/NC.   CV: RRR.   Resp: CTA bilaterally. No wheezing, rhonchi or rales.   GI: Soft, ND.  Extrem: Pulses intact.  Skin: No Jaundice.   Neuro: Unable to assess.   Psych: Unable to assess.     Results from last 7 days   Lab Units 06/08/25  0530 06/07/25 2011 06/07/25  1053   WBC AUTO x10*3/uL 6.1 6.4 5.5   HEMOGLOBIN g/dL 9.1* 9.2* 5.6*   HEMATOCRIT % 28.6* 27.7* 18.1*   PLATELETS AUTO x10*3/uL 160 156 174     Results from last 7 days   Lab Units 06/08/25  0530 06/07/25  1053   SODIUM mmol/L 138 138   POTASSIUM mmol/L 5.5* 5.2   CHLORIDE mmol/L 112* 108*   CO2 mmol/L 16* 18*   BUN mg/dL 48* 53*   CREATININE mg/dL 2.37* 2.57*   CALCIUM mg/dL 8.0* 8.4*   PROTEIN TOTAL g/dL  --  8.8*   BILIRUBIN TOTAL mg/dL  --  0.3   ALK PHOS U/L  --  85   ALT U/L  --  8   AST U/L  --  10   GLUCOSE mg/dL 94 110*         ASSESSMENT/PLAN:  Bora Moreno is a 89 y.o. female with a PMH of CAD, PVD, CKD IV, HTN, HLD, TAVR, cachexia, COPD, former tobacco use, and Afib (not on AC) who presented the hospital with weakness after recent ER visit fall c/b left humerus fracture and was found to be anemic in whom we are consulted for further management.     Patient with iron deficiency anemia.  Hemoglobin: 5.6 --? 9/2 s/p 2u PRBCs.  There is no concern for overt GI bleeding.  Patient adamantly refuses endoscopic evaluation, but  is going to confirm this.  I tend to agree with this given her age and comorbidities.  Would opt for supportive care and even goals of care discussion.  Avoid NSAIDs.   Prophylactic PPI.  Will continue to follow.      Mike Cabrera DO  GI Attending         [1]   Allergies  Allergen Reactions    Captopril Unknown     rhinitis    Influenza Virus Vaccines Nausea And Vomiting

## 2025-06-08 NOTE — PROGRESS NOTES
"Daily Progress Note    oBra Moreno is a 89 y.o. female on day 1 of admission presenting with Frequent falls.    Subjective   Patient resting comfortably possibly bedside.  Patient more awake and alert today.  Left arm remains in a sling.  Appreciate Ortho and GI recommendations.  Patient will need rehab on discharge.       Objective     Physical Exam    Physical Exam  Constitutional:       Appearance: She is cachectic.      Comments: Elderly and frail   HENT:      Head: Normocephalic.      Mouth/Throat:      Mouth: Mucous membranes are moist.   Eyes:      Pupils: Pupils are equal, round, and reactive to light.   Cardiovascular:      Rate and Rhythm: Normal rate and regular rhythm.      Heart sounds: Normal heart sounds, S1 normal and S2 normal.   Pulmonary:      Effort: Pulmonary effort is normal.      Breath sounds: Normal breath sounds.   Abdominal:      General: Bowel sounds are normal.      Palpations: Abdomen is soft.   Musculoskeletal:         General: Normal range of motion.      Cervical back: Neck supple.      Comments: Left arm in sling   Skin:     General: Skin is warm.   Neurological:      Mental Status: She is alert and oriented to person, place, and time.      Motor: Weakness present.   Psychiatric:         Mood and Affect: Mood normal.         Behavior: Behavior normal.         Cognition and Memory: Cognition is impaired.         Last Recorded Vitals  Blood pressure 137/65, pulse 98, temperature 37 °C (98.6 °F), resp. rate 18, height 1.702 m (5' 7\"), weight (!) 44.4 kg (97 lb 14.2 oz), SpO2 96%.  Intake/Output last 3 Shifts:  I/O last 3 completed shifts:  In: 2269.6 (51.1 mL/kg) [P.O.:360; I.V.:825.3 (18.6 mL/kg); Blood:1084.3]  Out: 0 (0 mL/kg)   Weight: 44.4 kg     Medications  Scheduled medications  Scheduled Medications[1]  Continuous medications  Continuous Medications[2]  PRN medications  PRN Medications[3]    Labs  CBC:   Results from last 7 days   Lab Units 06/08/25  0530 06/07/25 2011 " "06/07/25  1053   WBC AUTO x10*3/uL 6.1 6.4 5.5   RBC AUTO x10*6/uL 2.77* 2.83* 1.59*   HEMOGLOBIN g/dL 9.1* 9.2* 5.6*   HEMATOCRIT % 28.6* 27.7* 18.1*   MCV fL 103* 98 114*   MCH pg 32.9 32.5 35.2*   MCHC g/dL 31.8* 33.2 30.9*   RDW % 22.3* 20.6* 13.9   PLATELETS AUTO x10*3/uL 160 156 174     CMP:    Results from last 7 days   Lab Units 06/08/25  0530 06/07/25  1053   SODIUM mmol/L 138 138   POTASSIUM mmol/L 5.5* 5.2   CHLORIDE mmol/L 112* 108*   CO2 mmol/L 16* 18*   BUN mg/dL 48* 53*   CREATININE mg/dL 2.37* 2.57*   GLUCOSE mg/dL 94 110*   PROTEIN TOTAL g/dL  --  8.8*   CALCIUM mg/dL 8.0* 8.4*   BILIRUBIN TOTAL mg/dL  --  0.3   ALK PHOS U/L  --  85   AST U/L  --  10   ALT U/L  --  8     BMP:    Results from last 7 days   Lab Units 06/08/25  0530 06/07/25  1053   SODIUM mmol/L 138 138   POTASSIUM mmol/L 5.5* 5.2   CHLORIDE mmol/L 112* 108*   CO2 mmol/L 16* 18*   BUN mg/dL 48* 53*   CREATININE mg/dL 2.37* 2.57*   CALCIUM mg/dL 8.0* 8.4*   GLUCOSE mg/dL 94 110*     Magnesium:  Results from last 7 days   Lab Units 06/08/25  0530 06/07/25  1053   MAGNESIUM mg/dL 1.78 1.87     Troponin:    Results from last 7 days   Lab Units 06/07/25  1153 06/07/25  1053   TROPHS ng/L 23* 26*     BNP:   Results from last 7 days   Lab Units 06/07/25  1053   BNP pg/mL 275*     Lipid Panel:  Results from last 7 days   Lab Units 06/07/25  1053   INR  1.3*   PROTIME seconds 14.0*        Nutrition             Relevant Results  Results from last 7 days   Lab Units 06/08/25  0530 06/07/25  1053   GLUCOSE mg/dL 94 110*     No results found for: \"HGBA1C\"     Assessment/Plan    Mechanical fall  GI bleed unspecified  Anemia  Left humeral fracture from 6/6  Malnourishment  Cystitis without hematuria    -Patient came to the hospital yesterday after a fall sustaining left humerus fracture and discharged home fu  Monday w/Ortho  -Patient weak today and returned to the ER hemoglobin 5.4  -Received 2 units in ER  -Imaging unremarkable with exception of " age-indeterminate compression fracture L3-L5  -Consult GI patient adamantly refuses endoscopic eval, continue PPI and avoid NSAIDs  -Iron studies  -Patient has received iron infusion in the past  -Trend H&H  -Transfuse for <7  -Consult ortho  -Sling to left arm, ice and pain meds as needed  -Consult Nutrition  -Will start on Rocephin  -Consult Ortho recommend shoulder immobilizer and follow-up 10 to 14 days  -PT/OT evaluation      CAD  HTN/HLD  PVD  Mitral regurg  OA  COPD  CKD stage IV  A-fib not on AC  -Resume home med      DVTp: GI bleed    PLAN: Will need SNF    DEBORAH Sung-CNP    Plan of care was discussed extensively with patient.  Patient verbalized understanding through teach back method.  All question and concerns addressed upon examination.    Of note, this documentation is completed using the Dragon Dictation system (voice recognition software). There may be spelling and/or grammatical errors that were not corrected prior to final submission.             [1] atorvastatin, 20 mg, oral, Nightly  cefTRIAXone, 1 g, intravenous, q24h  donepezil, 10 mg, oral, Nightly  fluticasone furoate-vilanteroL, 1 puff, inhalation, Daily  lidocaine, 1 patch, transdermal, Daily  montelukast, 10 mg, oral, Daily  polyethylene glycol, 17 g, oral, Daily  [Held by provider] valsartan 80 mg, hydroCHLOROthiazide 12.5 mg for Diovan HCT, , oral, Daily    [2]    [3] PRN medications: acetaminophen **OR** acetaminophen **OR** acetaminophen, morphine, ondansetron **OR** ondansetron, oxyCODONE

## 2025-06-08 NOTE — CARE PLAN
The patient's goals for the shift include Labs WNL    The clinical goals for the shift include Patients Hgb to remain within normal limits throguhout shift    Patient confused and has complaints of pain. Patient given, Roxicodone, morhpine, lidocaine patch, Ice bag, and refused Zanaflex. Patient given IM Zyprexa due to confusion and agitation of pulling at IV, trying to climb out of bed and yelling at staff. Will continue to monitor pain and follow plan of care.       Problem: Pain - Adult  Goal: Verbalizes/displays adequate comfort level or baseline comfort level  Outcome: Progressing     Problem: Safety - Adult  Goal: Free from fall injury  Outcome: Progressing     Problem: Skin  Goal: Decreased wound size/increased tissue granulation at next dressing change  Outcome: Progressing  Goal: Participates in plan/prevention/treatment measures  Outcome: Progressing  Goal: Prevent/manage excess moisture  Outcome: Progressing  Goal: Prevent/minimize sheer/friction injuries  Outcome: Progressing  Goal: Promote/optimize nutrition  Outcome: Progressing  Goal: Promote skin healing  Outcome: Progressing

## 2025-06-08 NOTE — SIGNIFICANT EVENT
Dementia with behaviors  Zyprexa 5 mg IM ordered overnight for increased agitation    - Did appear to help with behaviors   - Consider adding nightly

## 2025-06-08 NOTE — CONSULTS
History: Helga is a pleasant 89-year-old female who fell onto her left side.  She is brought to the hospital and found to have a left proximal humerus fracture.  She has had progressive weakness and difficulty ambulating.  The orthopedic service was consulted regarding this left arm injury.    Past medical history: Multiple  Medications: Multiple  Allergies: No known drug allergies    Please refer to the intake H&P regarding the patient's review of systems, family history and social history as was done today    HEENT: Normal  Lungs: Clear to auscultation  Heart: RRR  Abdomen: Soft, nontender  Skin: clear  Extremity: She has mild swelling and ecchymosis about the left arm.  There is pain with any attempted left shoulder motion.  Elbow and hand do move well.  She denies any other complaints of extremity trauma but is somewhat lethargic as well.  Contralateral exam is normal for strength, motion, stability and neurovascular assessment.    Radiographs: X-rays of the left shoulder show a well aligned proximal humerus fracture without extension into the glenohumeral joint.    Assessment: Minimally displaced left proximal humerus fracture    Plan: Given the overall fracture alignment and the patient's multiple comorbidities I would recommend continuing with nonoperative treatment.  She would benefit from a shoulder immobilizer as she tries to take the sling off repeatedly.  We discussed the risks of fracture displacement with her family and the potential need for surgical intervention if there is significant displacement noted.  If discharged she can follow-up with the orthopedic team over the next 10 to 14 days for recheck with new x-rays at that time.  Please call with any questions or concerns.  We thank you for this consultation.

## 2025-06-09 ENCOUNTER — APPOINTMENT (OUTPATIENT)
Dept: ORTHOPEDIC SURGERY | Facility: CLINIC | Age: 89
End: 2025-06-09
Payer: MEDICARE

## 2025-06-09 LAB
ANION GAP SERPL CALC-SCNC: 19 MMOL/L (ref 10–20)
BUN SERPL-MCNC: 47 MG/DL (ref 6–23)
CALCIUM SERPL-MCNC: 8.4 MG/DL (ref 8.6–10.3)
CHLORIDE SERPL-SCNC: 114 MMOL/L (ref 98–107)
CO2 SERPL-SCNC: 14 MMOL/L (ref 21–32)
CREAT SERPL-MCNC: 2.47 MG/DL (ref 0.5–1.05)
EGFRCR SERPLBLD CKD-EPI 2021: 18 ML/MIN/1.73M*2
ERYTHROCYTE [DISTWIDTH] IN BLOOD BY AUTOMATED COUNT: 21.2 % (ref 11.5–14.5)
GLUCOSE BLD MANUAL STRIP-MCNC: 74 MG/DL (ref 74–99)
GLUCOSE SERPL-MCNC: 69 MG/DL (ref 74–99)
HCT VFR BLD AUTO: 26.3 % (ref 36–46)
HGB BLD-MCNC: 8.4 G/DL (ref 12–16)
HOLD SPECIMEN: NORMAL
MAGNESIUM SERPL-MCNC: 1.65 MG/DL (ref 1.6–2.4)
MCH RBC QN AUTO: 32.6 PG (ref 26–34)
MCHC RBC AUTO-ENTMCNC: 31.9 G/DL (ref 32–36)
MCV RBC AUTO: 102 FL (ref 80–100)
NRBC BLD-RTO: 0 /100 WBCS (ref 0–0)
PATH REVIEW-CBC DIFFERENTIAL: NORMAL
PLATELET # BLD AUTO: 168 X10*3/UL (ref 150–450)
POTASSIUM SERPL-SCNC: 5.2 MMOL/L (ref 3.5–5.3)
RBC # BLD AUTO: 2.58 X10*6/UL (ref 4–5.2)
SODIUM SERPL-SCNC: 142 MMOL/L (ref 136–145)
WBC # BLD AUTO: 8.5 X10*3/UL (ref 4.4–11.3)

## 2025-06-09 PROCEDURE — 2500000005 HC RX 250 GENERAL PHARMACY W/O HCPCS: Performed by: NURSE PRACTITIONER

## 2025-06-09 PROCEDURE — 80048 BASIC METABOLIC PNL TOTAL CA: CPT

## 2025-06-09 PROCEDURE — 2500000004 HC RX 250 GENERAL PHARMACY W/ HCPCS (ALT 636 FOR OP/ED): Performed by: STUDENT IN AN ORGANIZED HEALTH CARE EDUCATION/TRAINING PROGRAM

## 2025-06-09 PROCEDURE — 82947 ASSAY GLUCOSE BLOOD QUANT: CPT

## 2025-06-09 PROCEDURE — 99232 SBSQ HOSP IP/OBS MODERATE 35: CPT | Performed by: STUDENT IN AN ORGANIZED HEALTH CARE EDUCATION/TRAINING PROGRAM

## 2025-06-09 PROCEDURE — 36415 COLL VENOUS BLD VENIPUNCTURE: CPT

## 2025-06-09 PROCEDURE — 99222 1ST HOSP IP/OBS MODERATE 55: CPT | Performed by: NURSE PRACTITIONER

## 2025-06-09 PROCEDURE — 97165 OT EVAL LOW COMPLEX 30 MIN: CPT | Mod: GO

## 2025-06-09 PROCEDURE — 85027 COMPLETE CBC AUTOMATED: CPT

## 2025-06-09 PROCEDURE — 1200000002 HC GENERAL ROOM WITH TELEMETRY DAILY

## 2025-06-09 PROCEDURE — 2500000004 HC RX 250 GENERAL PHARMACY W/ HCPCS (ALT 636 FOR OP/ED)

## 2025-06-09 PROCEDURE — 99232 SBSQ HOSP IP/OBS MODERATE 35: CPT

## 2025-06-09 PROCEDURE — 2500000001 HC RX 250 WO HCPCS SELF ADMINISTERED DRUGS (ALT 637 FOR MEDICARE OP): Performed by: STUDENT IN AN ORGANIZED HEALTH CARE EDUCATION/TRAINING PROGRAM

## 2025-06-09 PROCEDURE — 97161 PT EVAL LOW COMPLEX 20 MIN: CPT | Mod: GP | Performed by: PHYSICAL THERAPIST

## 2025-06-09 PROCEDURE — 83735 ASSAY OF MAGNESIUM: CPT

## 2025-06-09 RX ORDER — SODIUM CHLORIDE 9 MG/ML
75 INJECTION, SOLUTION INTRAVENOUS CONTINUOUS
Status: ACTIVE | OUTPATIENT
Start: 2025-06-09 | End: 2025-06-10

## 2025-06-09 RX ORDER — METOPROLOL TARTRATE 1 MG/ML
5 INJECTION, SOLUTION INTRAVENOUS ONCE
Status: COMPLETED | OUTPATIENT
Start: 2025-06-09 | End: 2025-06-09

## 2025-06-09 RX ADMIN — LIDOCAINE 1 PATCH: 4 PATCH TOPICAL at 09:11

## 2025-06-09 RX ADMIN — OXYCODONE 5 MG: 5 TABLET ORAL at 12:39

## 2025-06-09 RX ADMIN — CEFTRIAXONE 1 G: 1 INJECTION, SOLUTION INTRAVENOUS at 09:11

## 2025-06-09 RX ADMIN — MORPHINE SULFATE 2 MG: 2 INJECTION, SOLUTION INTRAMUSCULAR; INTRAVENOUS at 06:17

## 2025-06-09 RX ADMIN — METOPROLOL TARTRATE 5 MG: 5 INJECTION INTRAVENOUS at 16:20

## 2025-06-09 RX ADMIN — MONTELUKAST SODIUM 10 MG: 10 TABLET, FILM COATED ORAL at 09:11

## 2025-06-09 RX ADMIN — METOPROLOL TARTRATE 5 MG: 5 INJECTION INTRAVENOUS at 06:34

## 2025-06-09 RX ADMIN — SODIUM CHLORIDE 75 ML/HR: 0.9 INJECTION, SOLUTION INTRAVENOUS at 12:22

## 2025-06-09 ASSESSMENT — PAIN - FUNCTIONAL ASSESSMENT
PAIN_FUNCTIONAL_ASSESSMENT: 0-10
PAIN_FUNCTIONAL_ASSESSMENT: FLACC (FACE, LEGS, ACTIVITY, CRY, CONSOLABILITY)
PAIN_FUNCTIONAL_ASSESSMENT: 0-10
PAIN_FUNCTIONAL_ASSESSMENT: 0-10

## 2025-06-09 ASSESSMENT — COGNITIVE AND FUNCTIONAL STATUS - GENERAL
DRESSING REGULAR UPPER BODY CLOTHING: TOTAL
CLIMB 3 TO 5 STEPS WITH RAILING: TOTAL
DRESSING REGULAR LOWER BODY CLOTHING: TOTAL
EATING MEALS: A LOT
MOVING TO AND FROM BED TO CHAIR: TOTAL
WALKING IN HOSPITAL ROOM: TOTAL
CLIMB 3 TO 5 STEPS WITH RAILING: TOTAL
PERSONAL GROOMING: TOTAL
TOILETING: TOTAL
PERSONAL GROOMING: A LOT
MOBILITY SCORE: 8
HELP NEEDED FOR BATHING: TOTAL
EATING MEALS: TOTAL
DRESSING REGULAR UPPER BODY CLOTHING: TOTAL
DAILY ACTIVITIY SCORE: 6
HELP NEEDED FOR BATHING: TOTAL
MOVING FROM LYING ON BACK TO SITTING ON SIDE OF FLAT BED WITH BEDRAILS: A LOT
MOVING TO AND FROM BED TO CHAIR: TOTAL
PERSONAL GROOMING: TOTAL
HELP NEEDED FOR BATHING: TOTAL
DRESSING REGULAR LOWER BODY CLOTHING: TOTAL
MOBILITY SCORE: 6
MOBILITY SCORE: 8
TURNING FROM BACK TO SIDE WHILE IN FLAT BAD: A LOT
TURNING FROM BACK TO SIDE WHILE IN FLAT BAD: TOTAL
CLIMB 3 TO 5 STEPS WITH RAILING: TOTAL
WALKING IN HOSPITAL ROOM: TOTAL
DRESSING REGULAR UPPER BODY CLOTHING: TOTAL
DRESSING REGULAR LOWER BODY CLOTHING: TOTAL
EATING MEALS: TOTAL
MOVING FROM LYING ON BACK TO SITTING ON SIDE OF FLAT BED WITH BEDRAILS: TOTAL
WALKING IN HOSPITAL ROOM: TOTAL
STANDING UP FROM CHAIR USING ARMS: TOTAL
STANDING UP FROM CHAIR USING ARMS: A LOT
DAILY ACTIVITIY SCORE: 8
TOILETING: TOTAL
TOILETING: TOTAL
TURNING FROM BACK TO SIDE WHILE IN FLAT BAD: TOTAL
DAILY ACTIVITIY SCORE: 6
MOVING FROM LYING ON BACK TO SITTING ON SIDE OF FLAT BED WITH BEDRAILS: A LOT
MOVING TO AND FROM BED TO CHAIR: TOTAL
STANDING UP FROM CHAIR USING ARMS: TOTAL

## 2025-06-09 ASSESSMENT — PAIN DESCRIPTION - DESCRIPTORS
DESCRIPTORS: DISCOMFORT;SORE;TENDER
DESCRIPTORS: DISCOMFORT;ACHING;SORE;TENDER

## 2025-06-09 ASSESSMENT — PAIN SCALES - GENERAL
PAINLEVEL_OUTOF10: 3
PAINLEVEL_OUTOF10: 0 - NO PAIN
PAINLEVEL_OUTOF10: 6
PAINLEVEL_OUTOF10: 0 - NO PAIN

## 2025-06-09 ASSESSMENT — ACTIVITIES OF DAILY LIVING (ADL)
BATHING_ASSISTANCE: TOTAL
ADL_ASSISTANCE: INDEPENDENT

## 2025-06-09 NOTE — CARE PLAN
The patient's goals for the shift include Labs WNL    The clinical goals for the shift include Labs WNL      Problem: Pain - Adult  Goal: Verbalizes/displays adequate comfort level or baseline comfort level  Outcome: Progressing     Problem: Safety - Adult  Goal: Free from fall injury  Outcome: Progressing     Problem: Discharge Planning  Goal: Discharge to home or other facility with appropriate resources  Outcome: Progressing     Problem: Chronic Conditions and Co-morbidities  Goal: Patient's chronic conditions and co-morbidity symptoms are monitored and maintained or improved  Outcome: Progressing     Problem: Nutrition  Goal: Nutrient intake appropriate for maintaining nutritional needs  Outcome: Progressing     Problem: Skin  Goal: Decreased wound size/increased tissue granulation at next dressing change  6/9/2025 1853 by Janet Cuevas RN  Outcome: Progressing  6/9/2025 0915 by Janet Cuevas RN  Flowsheets (Taken 6/9/2025 0915)  Decreased wound size/increased tissue granulation at next dressing change:   Utilize specialty bed per algorithm   Promote sleep for wound healing   Protective dressings over bony prominences  Goal: Participates in plan/prevention/treatment measures  6/9/2025 1853 by Janet Cuevas RN  Outcome: Progressing  6/9/2025 0915 by Janet Cuevas RN  Flowsheets (Taken 6/9/2025 0915)  Participates in plan/prevention/treatment measures:   Increase activity/out of bed for meals   Discuss with provider PT/OT consult   Elevate heels  Goal: Prevent/manage excess moisture  6/9/2025 1853 by Janet Cuevas RN  Outcome: Progressing  6/9/2025 0915 by Janet Cuevas RN  Flowsheets (Taken 6/9/2025 0915)  Prevent/manage excess moisture:   Use wicking fabric (obtain order)   Moisturize dry skin   Cleanse incontinence/protect with barrier cream   Monitor for/manage infection if present   Follow provider orders for dressing changes  Goal: Prevent/minimize sheer/friction  injuries  6/9/2025 1853 by Janet Cuevas RN  Outcome: Progressing  6/9/2025 0915 by Janet Cuevas RN  Flowsheets (Taken 6/9/2025 0915)  Prevent/minimize sheer/friction injuries:   Utilize specialty bed per algorithm   Turn/reposition every 2 hours/use positioning/transfer devices   Use pull sheet   Increase activity/out of bed for meals   HOB 30 degrees or less   Complete micro-shifts as needed if patient unable. Adjust patient position to relieve pressure points, not a full turn  Goal: Promote/optimize nutrition  6/9/2025 1853 by Janet Cuevas RN  Outcome: Progressing  6/9/2025 0915 by Janet Cuevas RN  Flowsheets (Taken 6/9/2025 0915)  Promote/optimize nutrition:   Offer water/supplements/favorite foods   Discuss with provider if NPO > 2 days   Assist with feeding   Reassess MST if dietician not consulted   Monitor/record intake including meals   Consume > 50% meals/supplements  Goal: Promote skin healing  6/9/2025 1853 by Janet Cuevas RN  Outcome: Progressing  6/9/2025 0915 by Janet Cuevas RN  Flowsheets (Taken 6/9/2025 0915)  Promote skin healing:   Turn/reposition every 2 hours/use positioning/transfer devices   Rotate device position/do not position patient on device   Protective dressings over bony prominences   Ensure correct size (line/device) and apply per  instructions   Assess skin/pad under line(s)/device(s)     Problem: Fall/Injury  Goal: Not fall by end of shift  Outcome: Progressing  Goal: Be free from injury by end of the shift  Outcome: Progressing  Goal: Verbalize understanding of personal risk factors for fall in the hospital  Outcome: Progressing  Goal: Verbalize understanding of risk factor reduction measures to prevent injury from fall in the home  Outcome: Progressing  Goal: Use assistive devices by end of the shift  Outcome: Progressing  Goal: Pace activities to prevent fatigue by end of the shift  Outcome: Progressing     Problem: Pain  Goal:  Takes deep breaths with improved pain control throughout the shift  Outcome: Progressing  Goal: Turns in bed with improved pain control throughout the shift  Outcome: Progressing  Goal: Walks with improved pain control throughout the shift  Outcome: Progressing  Goal: Performs ADL's with improved pain control throughout shift  Outcome: Progressing  Goal: Participates in PT with improved pain control throughout the shift  Outcome: Progressing  Goal: Free from opioid side effects throughout the shift  Outcome: Progressing  Goal: Free from acute confusion related to pain meds throughout the shift  Outcome: Progressing

## 2025-06-09 NOTE — PROGRESS NOTES
DAILY PROGRESS NOTE      Left humerus neck fracture    Patient is lethargic, sleeping. She is not c/o left arm pain.  Visit Vitals  /51   Pulse 101   Temp 36.8 °C (98.2 °F)   Resp 16      Temp (24hrs), Av.5 °C (97.7 °F), Min:35.8 °C (96.4 °F), Max:37.1 °C (98.8 °F)       Pain Control - well controlled  No chest pain or shortness of breath.    Exam:   Bruising to left upper extremity. Shoulder immobilizer in place. Compartment are soft.  Extremity shows neuro vascular status intact. Able to wiggle fingers.        Labs reviewed:  Recent Results (from the past 24 hours)   ECG 12 lead    Collection Time: 25  3:33 PM   Result Value Ref Range    Ventricular Rate 100 BPM    Atrial Rate 100 BPM    MN Interval 152 ms    QRS Duration 86 ms    QT Interval 338 ms    QTC Calculation(Bazett) 436 ms    P Axis 78 degrees    R Axis 61 degrees    T Axis 69 degrees    QRS Count 17 beats    Q Onset 219 ms    P Onset 143 ms    P Offset 210 ms    T Offset 388 ms    QTC Fredericia 401 ms   Basic Metabolic Panel    Collection Time: 25  4:14 AM   Result Value Ref Range    Glucose 69 (L) 74 - 99 mg/dL    Sodium 142 136 - 145 mmol/L    Potassium 5.2 3.5 - 5.3 mmol/L    Chloride 114 (H) 98 - 107 mmol/L    Bicarbonate 14 (L) 21 - 32 mmol/L    Anion Gap 19 10 - 20 mmol/L    Urea Nitrogen 47 (H) 6 - 23 mg/dL    Creatinine 2.47 (H) 0.50 - 1.05 mg/dL    eGFR 18 (L) >60 mL/min/1.73m*2    Calcium 8.4 (L) 8.6 - 10.3 mg/dL   CBC    Collection Time: 25  4:14 AM   Result Value Ref Range    WBC 8.5 4.4 - 11.3 x10*3/uL    nRBC 0.0 0.0 - 0.0 /100 WBCs    RBC 2.58 (L) 4.00 - 5.20 x10*6/uL    Hemoglobin 8.4 (L) 12.0 - 16.0 g/dL    Hematocrit 26.3 (L) 36.0 - 46.0 %     (H) 80 - 100 fL    MCH 32.6 26.0 - 34.0 pg    MCHC 31.9 (L) 32.0 - 36.0 g/dL    RDW 21.2 (H) 11.5 - 14.5 %    Platelets 168 150 - 450 x10*3/uL   Magnesium    Collection Time: 25  4:14 AM   Result Value Ref Range    Magnesium 1.65 1.60 - 2.40 mg/dL    SST TOP    Collection Time: 06/09/25  4:14 AM   Result Value Ref Range    Extra Tube Hold for add-ons.    POCT GLUCOSE    Collection Time: 06/09/25  9:18 AM   Result Value Ref Range    POCT Glucose 74 74 - 99 mg/dL       I&O  I/O last 3 completed shifts:  In: 1326 (29.9 mL/kg) [P.O.:480; I.V.:796 (17.9 mL/kg); IV Piggyback:50]  Out: 450 (10.1 mL/kg) [Urine:450 (0.3 mL/kg/hr)]  Weight: 44.4 kg       Assessment:  Left humerus neck fracture    Dr. Eliel Riggins saw the patient yesterday and recommended nonoperative management due to overall fracture alignment and the patient's multiple comorbidities.  She is in the shoulder immobilizer, tolerating.  WBC 8.5, no leukocytosis.  Hemoglobin 8.4, small change from yesterday with hemoglobin 9.1.      Plan: Continue current plan of nonoperative treatment with shoulder immobilizer as she tries to take the sling off.  Nonweightbearing to left upper extremity and no range of motion to the shoulder.  Okay for range of motion to elbow, wrist, and hand.  Follow-up with orthopedic team in 10 to 14 days.    Lorenza Casillas PA-C   6/9/2025 11:12 AM

## 2025-06-09 NOTE — CONSULTS
"Nutrition Initial Assessment:   Nutrition Assessment    Reason for Assessment: Admission nursing screening    Patient is a 89 y.o. female presenting with frequent falls  PMH CAD, HTN, HLD, PVD, mitral regurg, JAMAR, COPD, CKD stage IV, anemia, and A-fib not on OAC who is presenting with weakness status post fall.       Nutrition History:  Energy Intake:  (no meal intakes recorded at this time)  Pain affecting nutrition status: N/A  Food and Nutrient History: RDN consult for malnutrition per RN screen. Pt sleeping at time of visit, pt's  providing history.  reports wt fluctuations in the past few months, states UBW ~114 lbs.  states po intake has fluctuated, reports pt was still eating 3 meals/day, states pt has had Ensure supplements in the past and did not like them.  states pt does not drink milk but reports pt likes ice cream - will trial Magic Cups and follow for acceptance.  denies chewing or swallowing difficulty. Will continue to monitor.  Vitamin/Herbal Supplement Use: none per home med list       Anthropometrics:  Height: 170.2 cm (5' 7\")   Weight: (!) 44.4 kg (97 lb 14.2 oz)   BMI (Calculated): 15.33  IBW/kg (Dietitian Calculated): 61.4 kg  Percent of IBW: 46 %                      Weight History:   Wt Readings from Last 10 Encounters:   06/07/25 (!) 44.4 kg (97 lb 14.2 oz)   06/06/25 47.6 kg (105 lb) - stated   05/02/25 (!) 44.5 kg (98 lb 3.2 oz)   02/19/25 (!) 45 kg (99 lb 1.6 oz)   01/03/25 45.7 kg (100 lb 12.8 oz)   10/01/24 48.3 kg (106 lb 6.4 oz)   09/16/24 51.3 kg (113 lb)   07/01/24 49.9 kg (110 lb)   03/29/24 51.3 kg (113 lb 3.2 oz)   02/21/24 50.6 kg (111 lb 8 oz)      Weight Change %:  Weight History / % Weight Change: 3 lb, 3% nonsignificant wt loss in 5 months  Significant Weight Loss: No    Nutrition Focused Physical Exam Findings:    Subcutaneous Fat Loss:   Orbital Fat Pads: Severe (dark circles, hollowing and loose skin)  Buccal Fat Pads: Severe (hollow, " "sunken and narrow face)  Muscle Wasting:  Temporalis: Severe (hollowed scooping depression)  Pectoralis (Clavicular Region): Severe (protruding prominent clavicle)  Deltoid/Trapezius: Severe (squared shoulders, acromion process prominent)  Edema:  Edema: +2 mild (LUE)  Physical Findings:  Skin: Negative    Nutrition Significant Labs:  BMP Trend:   Results from last 7 days   Lab Units 06/09/25  0414 06/08/25  0530 06/07/25  1053   GLUCOSE mg/dL 69* 94 110*   CALCIUM mg/dL 8.4* 8.0* 8.4*   SODIUM mmol/L 142 138 138   POTASSIUM mmol/L 5.2 5.5* 5.2   CO2 mmol/L 14* 16* 18*   CHLORIDE mmol/L 114* 112* 108*   BUN mg/dL 47* 48* 53*   CREATININE mg/dL 2.47* 2.37* 2.57*    , A1C:No results found for: \"HGBA1C\", BG POCT trend:   Results from last 7 days   Lab Units 06/09/25  0918   POCT GLUCOSE mg/dL 74        Nutrition Specific Medications:  Scheduled medications  Scheduled Medications[1]  Continuous medications  Continuous Medications[2]  PRN medications  PRN Medications[3]     I/O:   Last BM Date: 06/05/25;      Dietary Orders (From admission, onward)       Start     Ordered    06/09/25 1621  Oral nutritional supplements  Until discontinued        Question Answer Comment   Deliver with Lunch    Deliver with Dinner    Deliver with Breakfast    Select supplement: Magic Cup        06/09/25 1620    06/07/25 1647  May Participate in Room Service With Assistance  ( ROOM SERVICE MAY PARTICIPATE WITH ASSISTANCE)  Once        Question:  .  Answer:  Yes    06/07/25 1646    06/07/25 1629  Adult diet Regular  Diet effective now        Question:  Diet type  Answer:  Regular    06/07/25 1628                     Estimated Needs:   Total Energy Estimated Needs in 24 hours (kCal): 1554 kCal  Method for Estimating Needs: 35 kcal/kg ABW  Total Protein Estimated Needs in 24 Hours (g): 53 g  Method for Estimating 24 Hour Protein Needs: 1.2 g/kg ABW or as renal function allows  Total Fluid Estimated Needs in 24 Hours (mL): 1554 mL  Method for " Estimating 24 Hour Fluid Needs: 1 ml/kcal or per MD  Patient on Order Fluid Restriction: No        Nutrition Diagnosis   Malnutrition Diagnosis  Patient has Malnutrition Diagnosis: Yes  Diagnosis Status: New  Malnutrition Diagnosis: Severe malnutrition related to chronic disease or condition  Related to: COPD with other chronic conditions  As Evidenced by: severe muscle wasting, severe fat loss, suspect pt meeting <75% of estimated energy needs            Nutrition Interventions/Recommendations   Nutrition prescription for oral nutrition    Nutrition Recommendations:  Individualized Nutrition Prescription Provided for : Regular diet with ONS    Nutrition Interventions/Goals:   Meals and Snacks: General healthful diet  Goal: Consumes 3 meals per day  Medical Food Supplement: Commercial beverage medical food supplement therapy  Goal: Magic cup TID (290 kcal, 9 g protein per serving).      Education Documentation  No documentation found.     declines needs at this time        Nutrition Monitoring and Evaluation   Food/Nutrient Related History Monitoring  Monitoring and Evaluation Plan: Intake / amount of food, Estimated Energy Intake  Estimated Energy Intake: Energy intake 50 -75% of estimated energy needs  Intake / Amount of food: Consumes at least 50% or more of meals/snacks/supplements    Anthropometric Measurements  Monitoring and Evaluation Plan: Body weight  Body Weight: Body weight - Maintain stable weight    Biochemical Data, Medical Tests and Procedures  Monitoring and Evaluation Plan: Electrolyte/renal panel, Glucose/endocrine profile  Electrolyte and Renal Panel: Electrolytes within normal limits  Glucose/Endocrine Profile: Glucose within normal limits ( mg/dL)              Time Spent (min): 40 minutes            [1] atorvastatin, 20 mg, oral, Nightly  cefTRIAXone, 1 g, intravenous, q24h  donepezil, 10 mg, oral, Nightly  fluticasone furoate-vilanteroL, 1 puff, inhalation, Daily  lidocaine, 1  patch, transdermal, Daily  montelukast, 10 mg, oral, Daily  polyethylene glycol, 17 g, oral, Daily  [Held by provider] valsartan 80 mg, hydroCHLOROthiazide 12.5 mg for Diovan HCT, , oral, Daily  [2] sodium chloride 0.9%, 75 mL/hr, Last Rate: 75 mL/hr (06/09/25 1222)  [3] PRN medications: acetaminophen **OR** acetaminophen **OR** acetaminophen, ondansetron **OR** ondansetron, oxyCODONE

## 2025-06-09 NOTE — PROGRESS NOTES
Spoke with patient's daughter Keeley, she is  flying in from [facility], I will discuss with her in further detail about discharge plans. CT team will continue to follow.

## 2025-06-09 NOTE — PROGRESS NOTES
Speech-Language Pathology                 Therapy Communication Note    Patient Name: Bora Moreno  MRN: [id number]  Department: [facility]  Room: 1025/1025-A  Today's Date: 6/9/2025     Discipline: Speech Language Pathology      Missed Visit Reason:  Patient unable to be awakened to participate in clinical swallow evaluation.    Missed Time: Attempt    Comment: Attempted to see patient x 2 today at her lunchtime and again at 2:00 PM.   was at bedside.  Unable to awaken patient to have patient participate in clinical swallow evaluation.  Will attempt again at later time/date.  Nurse gave information regarding patient taking her meds and palliative care nurse practitioner spoke with speech therapist regarding possible change in status to palliative care.  Speech therapy will attempt local swallow evaluation again tomorrow a.m.

## 2025-06-09 NOTE — PROGRESS NOTES
"Daily Progress Note    Bora Moreno is a 89 y.o. female on day 2 of admission presenting with Frequent falls.    Subjective   Patient lethargic and declining.  Spoke with  at bedside and daughter from [place] on the phone.  Will consult palliative care to discuss goals of care.  At this time patient will remain full code.       Objective     Physical Exam    Physical Exam  Constitutional:       Appearance: She is cachectic. She is ill-appearing.   HENT:      Head: Normocephalic.      Mouth/Throat:      Mouth: Mucous membranes are moist.   Eyes:      Pupils: Pupils are equal, round, and reactive to light.   Cardiovascular:      Rate and Rhythm: Normal rate and regular rhythm.      Heart sounds: Normal heart sounds, S1 normal and S2 normal.   Pulmonary:      Effort: Pulmonary effort is normal.      Breath sounds: Normal breath sounds.   Abdominal:      General: Bowel sounds are normal.      Palpations: Abdomen is soft.   Musculoskeletal:         General: Normal range of motion.      Cervical back: Neck supple.      Comments: Left arm in immobilizer   Skin:     General: Skin is warm.      Findings: Bruising present.   Neurological:      Mental Status: She is lethargic.      Motor: Weakness present.   Psychiatric:         Mood and Affect: Mood normal.         Behavior: Behavior normal.         Cognition and Memory: Cognition is impaired. Memory is impaired.         Last Recorded Vitals  Blood pressure 107/51, pulse 101, temperature 36.8 °C (98.2 °F), resp. rate 16, height 1.702 m (5' 7\"), weight (!) 44.4 kg (97 lb 14.2 oz), SpO2 93%.  Intake/Output last 3 Shifts:  I/O last 3 completed shifts:  In: 1326 (29.9 mL/kg) [P.O.:480; I.V.:796 (17.9 mL/kg); IV Piggyback:50]  Out: 450 (10.1 mL/kg) [Urine:450 (0.3 mL/kg/hr)]  Weight: 44.4 kg     Medications  Scheduled medications  Scheduled Medications[1]  Continuous medications  Continuous Medications[2]  PRN medications  PRN Medications[3]    Labs  CBC: " "  Results from last 7 days   Lab Units 06/09/25 0414 06/08/25 0530 06/07/25 2011   WBC AUTO x10*3/uL 8.5 6.1 6.4   RBC AUTO x10*6/uL 2.58* 2.77* 2.83*   HEMOGLOBIN g/dL 8.4* 9.1* 9.2*   HEMATOCRIT % 26.3* 28.6* 27.7*   MCV fL 102* 103* 98   MCH pg 32.6 32.9 32.5   MCHC g/dL 31.9* 31.8* 33.2   RDW % 21.2* 22.3* 20.6*   PLATELETS AUTO x10*3/uL 168 160 156     CMP:    Results from last 7 days   Lab Units 06/09/25 0414 06/08/25 0530 06/07/25  1053   SODIUM mmol/L 142 138 138   POTASSIUM mmol/L 5.2 5.5* 5.2   CHLORIDE mmol/L 114* 112* 108*   CO2 mmol/L 14* 16* 18*   BUN mg/dL 47* 48* 53*   CREATININE mg/dL 2.47* 2.37* 2.57*   GLUCOSE mg/dL 69* 94 110*   PROTEIN TOTAL g/dL  --   --  8.8*   CALCIUM mg/dL 8.4* 8.0* 8.4*   BILIRUBIN TOTAL mg/dL  --   --  0.3   ALK PHOS U/L  --   --  85   AST U/L  --   --  10   ALT U/L  --   --  8     BMP:    Results from last 7 days   Lab Units 06/09/25 0414 06/08/25 0530 06/07/25  1053   SODIUM mmol/L 142 138 138   POTASSIUM mmol/L 5.2 5.5* 5.2   CHLORIDE mmol/L 114* 112* 108*   CO2 mmol/L 14* 16* 18*   BUN mg/dL 47* 48* 53*   CREATININE mg/dL 2.47* 2.37* 2.57*   CALCIUM mg/dL 8.4* 8.0* 8.4*   GLUCOSE mg/dL 69* 94 110*     Magnesium:  Results from last 7 days   Lab Units 06/09/25 0414 06/08/25 0530 06/07/25  1053   MAGNESIUM mg/dL 1.65 1.78 1.87     Troponin:    Results from last 7 days   Lab Units 06/07/25  1153 06/07/25  1053   TROPHS ng/L 23* 26*     BNP:   Results from last 7 days   Lab Units 06/07/25  1053   BNP pg/mL 275*     Lipid Panel:  Results from last 7 days   Lab Units 06/07/25  1053   INR  1.3*   PROTIME seconds 14.0*        Nutrition             Relevant Results  Results from last 7 days   Lab Units 06/09/25  0918 06/09/25  0414 06/08/25  0530 06/07/25  1053   POCT GLUCOSE mg/dL 74  --   --   --    GLUCOSE mg/dL  --  69* 94 110*     No results found for: \"HGBA1C\"     Assessment/Plan    Mechanical fall  GI bleed unspecified  Anemia  Left humeral fracture from " 6/6  Malnourishment  Cystitis without hematuria    -Patient came to the hospital yesterday after a fall sustaining left humerus fracture and discharged home fu  Monday w/Ortho  -Patient weak today and returned to the ER hemoglobin 5.4  -Received 2 units in ER  -Imaging unremarkable with exception of age-indeterminate compression fracture L3-L5  -Consult GI patient adamantly refuses endoscopic eval, continue PPI and avoid NSAIDs  -Iron studies  -Patient has received iron infusion in the past  -Trend H&H  -Transfuse for <7  -Consult ortho no interventions at this time recommend immobilizer  -Consult speech therapy  -Consult palliative med  -Consult Nutrition  -Will start on Rocephin  -PT/OT evaluation    CAD  HTN/HLD  PVD  Mitral regurg  OA  COPD  CKD stage IV  A-fib not on AC  -Resume home med      DVTp: Hold for GI bleed    PLAN: SNF    DEBORAH Sung-CNP    Plan of care was discussed extensively with patient.  Patient verbalized understanding through teach back method.  All question and concerns addressed upon examination.    Of note, this documentation is completed using the Dragon Dictation system (voice recognition software). There may be spelling and/or grammatical errors that were not corrected prior to final submission.             [1] atorvastatin, 20 mg, oral, Nightly  cefTRIAXone, 1 g, intravenous, q24h  donepezil, 10 mg, oral, Nightly  fluticasone furoate-vilanteroL, 1 puff, inhalation, Daily  lidocaine, 1 patch, transdermal, Daily  montelukast, 10 mg, oral, Daily  polyethylene glycol, 17 g, oral, Daily  [Held by provider] valsartan 80 mg, hydroCHLOROthiazide 12.5 mg for Diovan HCT, , oral, Daily    [2] sodium chloride 0.9%, 80 mL/hr, Last Rate: 80 mL/hr (06/08/25 6807)  sodium chloride 0.9%, 75 mL/hr    [3] PRN medications: acetaminophen **OR** acetaminophen **OR** acetaminophen, ondansetron **OR** ondansetron, oxyCODONE

## 2025-06-09 NOTE — CARE PLAN
The patient's goals for the shift include Labs WNL    The clinical goals for the shift include Labs WNL    Problem: Pain - Adult  Goal: Verbalizes/displays adequate comfort level or baseline comfort level  Outcome: Progressing

## 2025-06-09 NOTE — PROGRESS NOTES
Occupational Therapy    Evaluation    Patient Name: Bora Moreno  MRN: [id number]  Department: [facility]  Room: [address] [place] [address]  Today's Date: 6/9/2025  Time Calculation  Start Time: 1046  Stop Time: 1105  Time Calculation (min): 19 min        Assessment:  OT Assessment: Pt. presents with impaired cognition, endurance, balance, strength, safety. Dependent/max A for all transfers, mobility, and ADLs.  Prognosis: Fair  Barriers to Discharge Home: Caregiver assistance, Cognition needs, Physical needs  Evaluation/Treatment Tolerance: Treatment limited secondary to medical complications (Comment) (cognition)  End of Session Communication: Bedside nurse  End of Session Patient Position: Bed, 3 rail up, Alarm on  OT Assessment Results: Decreased ADL status, Decreased safe judgment during ADL, Decreased cognition, Decreased endurance, Decreased functional mobility, Non-functional left upper extremity, Decreased trunk control for functional activities  Prognosis: Fair  Evaluation/Treatment Tolerance: Treatment limited secondary to medical complications (Comment) (cognition)  Plan:  Treatment Interventions: ADL retraining, Functional transfer training, Endurance training  OT Frequency: 2 times per week  OT Discharge Recommendations: Moderate intensity level of continued care, 24 hr supervision due to cognition  OT Recommended Transfer Status: Maximum assist, Assist of 2  OT - OK to Discharge: Yes (when medically stable/cleared)    Subjective   Current Problem:  1. Frequent falls        2. Anemia, unspecified type        3. Gastrointestinal hemorrhage, unspecified gastrointestinal hemorrhage type        4. Acute on chronic renal insufficiency          OT Visit Info:  OT Received On: 06/09/25  General:  General  Reason for Referral: ADL impairment  Referred By: Nils Jean MD PT/OT 6/7  Past Medical History Relevant to Rehab: PMH CAD, HTN, HLD, PVD, mitral regurg, OA, COPD, CKD stage IV, A-fib, age indeterminant L3-L5  compression fracture.  Family/Caregiver Present: Yes  Caregiver Feedback: supportive  present at the bedside throughout session.  Co-Treatment: PT  Co-Treatment Reason: to maximize pt. safety  Prior to Session Communication: Bedside nurse  Patient Position Received: Bed, 3 rail up, Alarm on  General Comment: Pt. is 89 y/o female to ED 6/7 s/p fall. CT head (-). CT C-spine (-) chest x-ray is negative for any acute findings other than patient's left humeral neck fracture which is from previous fall yesterday. Hbg 5.6  to acute on chronic anemia, GI bleed. Stable s/p 2 units of blood.  Precautions:  UE Weight Bearing Status: Left Non-Weight Bearing  Medical Precautions: Fall precautions  Braces Applied: Pt. was wearing shoulder immobilizer; per RN, due to agitation pt.  was requsting something more supportive and resistrictive than the sling. RN states she was placed in shoulder immobilizer per doctor clearance. (Straps on shoulder immobilizer donned tightly, this therapists loosened straps to avoid skin breakdown. RN notified. Allowed wrist out of strap due to significant brusing and edema/swelling to forearm. placed on pillows with ice.)  Precautions Comment: Nonweightbearing to left upper extremity and no range of motion to the shoulder.  Okay for range of motion to elbow, wrist, and hand (Per ortho NP)    Pain:  Pain Assessment  Pain Assessment:  (unable to verbalize. Pt. appears in no distress at rest but wincing in pain with mobility.)    Objective   Cognition:  Overall Cognitive Status: Impaired  Orientation Level: Disoriented X4  Following Commands:  (<10% command following)  Insight: Severe  Processing Speed: Delayed     Home Living:  Home Living Comments: Per pt. , Pt. lives with  in one story house, no steps to enter.  Prior Function:  Prior Function Comments: Prior to fall, pt.  states that she was independent with ADLs, used RW or WW for ambulation.  completed  IADLs. Two falls. Does not drive.    ADL:  Eating Assistance: Maximal  Grooming Assistance: Maximal  Bathing Assistance: Total  UE Dressing Assistance: Total  LE Dressing Assistance: Total  Toileting Assistance with Device: Total  Activity Tolerance:  Endurance: Decreased tolerance for upright activites  Bed Mobility/Transfers: Bed Mobility  Bed Mobility: Yes  Bed Mobility 1  Bed Mobility 1: Supine to sitting, Sitting to supine  Level of Assistance 1: Dependent, +2  Bed Mobility Comments 1: use of draw sheets to shift pt. hips and LEs to edge of bed and facilitating trunk to upright sit. head of bed elevated    Transfers  Transfer: Yes  Transfer 1  Technique 1: Sit to stand, Stand to sit  Transfer Device 1:  (hand held/arm in arm assist on R side. shoulder immobilizer donatilio BROWN. Max A x2 with bilateral knee blocking to stand from edge of bed. dependent to balance in stance.)    Sitting Balance:  Static Sitting Balance  Static Sitting-Level of Assistance: Minimum assistance    Strength:  Strength Comments: unable to formally assess as pt. was not able to follow commands    Hand Function:  Gross Grasp:  (did not initiate gross grasp/following commands to test.)  Extremities: RUE   RUE :  (not formally assessed) and KEVIN BROWN:  (Did not assess due to precautions)    Outcome Measures:[facility] Daily Activity  Putting on and taking off regular lower body clothing: Total  Bathing (including washing, rinsing, drying): Total  Putting on and taking off regular upper body clothing: Total  Toileting, which includes using toilet, bedpan or urinal: Total  Taking care of personal grooming such as brushing teeth: A lot  Eating Meals: A lot  Daily Activity - Total Score: 8    Education Documentation  Precautions, taught by Iesha East OT at 6/9/2025  1:30 PM.  Learner: Significant Other  Readiness: Acceptance  Method: Explanation  Response: Needs Reinforcement    ADL Training, taught by Iesha East OT at  6/9/2025  1:30 PM.  Learner: Significant Other  Readiness: Acceptance  Method: Explanation  Response: Needs Reinforcement    Education Comments  Pt. Not receptive or understanding of cues or education this date.     IP EDUCATION:  Education  Individual(s) Educated: Patient  Education Provided: Fall precautons, Risk and benefits of OT discussed with patient or other, POC discussed and agreed upon, Diagnosis & Precautions    Goals:  Encounter Problems       Encounter Problems (Active)       OT Goals       Mod A for UB dressing  (Progressing)       Start:  06/09/25    Expected End:  06/23/25            Mod A for grooming tasks and clothing mgmt  (Progressing)       Start:  06/09/25    Expected End:  06/23/25            Mod A for all functional transfers (Progressing)       Start:  06/09/25    Expected End:  06/23/25            Pt. will follow commands in 1/3 trials during ADLs/functional tasks  (Progressing)       Start:  06/09/25    Expected End:  06/23/25

## 2025-06-09 NOTE — PROGRESS NOTES
Physical Therapy    Physical Therapy Evaluation    Patient Name: Bora Moreno  MRN: [id number]  Today's Date: 6/9/2025   Time Calculation  Start Time: 1045  Stop Time: 1104  Time Calculation (min): 19 min  1025/1025-A    Assessment/Plan   PT Assessment  PT Assessment Results: Decreased strength, Decreased endurance, Impaired balance, Decreased mobility, Decreased cognition, Pain  Rehab Prognosis: Fair  Barriers to Discharge Home: Caregiver assistance, Physical needs  Caregiver Assistance: Caregiver assistance needed per identified barriers - however, level of patient's required assistance exceeds assistance available at home  Physical Needs: Ambulating household distances limited by function/safety, 24hr ADL assistance needed, High falls risk due to function or environment  Evaluation/Treatment Tolerance: Patient limited by pain, Patient limited by fatigue  Medical Staff Made Aware: Yes  End of Session Communication: Bedside nurse  Assessment Comment: Pt. is an 89 y/.o F admitted with L humeral fracture and anemia s/p fall. Pt. presents with lethargy, pain, weakness, impaired balance, and difficulty with all functional mobility compared to baseline. Pt. would benefit from skilled PT while IP to address these deficits.  End of Session Patient Position: Bed, 3 rail up, Alarm on  IP OR SWING BED PT PLAN  Inpatient or Swing Bed: Inpatient  PT Plan  Treatment/Interventions: Bed mobility, Transfer training, Gait training, Balance training, Strengthening, Endurance training, Therapeutic exercise, Therapeutic activity, Home exercise program  PT Plan: Ongoing PT  PT Frequency: 3 times per week  PT Discharge Recommendations: Moderate intensity level of continued care  PT Recommended Transfer Status: Assist x2  PT - OK to Discharge: Yes (PT evaluation complete and rehab recommendations made.)    Subjective       General Visit Information:  General  Reason for Referral: impaired mobility  Referred By: Nils Jean MD PT/OT  6/7  Past Medical History Relevant to Rehab: PMH CAD, HTN, HLD, PVD, mitral regurg, OA, COPD, CKD stage IV, A-fib, age indeterminant L3-L5 compression fracture.  Family/Caregiver Present: Yes  Caregiver Feedback: supportive  present at the bedside throughout session.  Co-Treatment: OT  Co-Treatment Reason: to maximize safety and functional mobility while focusing on discipline specific goals.  Prior to Session Communication: Bedside nurse  Patient Position Received: Bed, 3 rail up, Alarm on  General Comment: Presented for evaluation of fall. CT study of the head is negative, CT C-spine negative, chest x-ray is negative for any acute findings other than patient's left humeral neck fracture which is from previous fall yesterday. Hbg 5.6  to acute on chronic anemia, GI bleed. Stable s/p 2 units of blood. (Pt. presents with lethargy. Ox0 with 10% command follow. Dependent for bed mobility. +edema noted LUE. RN aware.)    Home Living:  Home Living  Type of Home: House  Lives With: Spouse  Home Adaptive Equipment: Walker rolling or standard, Cane  Home Layout: One level  Home Access: Level entry  Home Living Comments:  able to provide information about home set up and PLOF.    Prior Level of Function:  Prior Function Per Pt/Caregiver Report  Level of [place]: Independent with ADLs and functional transfers, Needs assistance with homemaking  Receives Help From: Family  ADL Assistance: Independent  Homemaking Assistance: Needs assistance ( performs cooking/cleaning)  Ambulatory Assistance: Independent (with RW vs. cane PRN)  Prior Function Comments: -driving,  reports 2 recent falls.    Precautions:  Precautions  UE Weight Bearing Status: Left Non-Weight Bearing (No ROM left shoulder. Okay for gentle ROM to elbow, wrist, and hand. Shoulder immobilizer in place upon arrival.)  Medical Precautions: Fall precautions       Objective     Pain:  Pain Assessment  Pain Assessment: 0-10 (unable to  "verbalize. Pt. appears in no distress at rest but wincing in pain with mobility.)    Cognition:  Cognition  Overall Cognitive Status: Impaired  Orientation Level: Disoriented X4  Following Commands:  (10% simple command follow- able to wiggle fingers, 1 verbalization this session, states \"I'm okay\")  Insight: Severe  Processing Speed: Delayed    General Assessments:      Activity Tolerance  Endurance: Decreased tolerance for upright activites  Sensation  Light Touch: Not tested  Strength  Strength Comments: unable to formally assess due to poor command follow. +cachectic appearance, generalized weakness noted.        Postural Control  Postural Control: Impaired  Static Sitting Balance  Static Sitting-Balance Support: Feet supported  Static Sitting-Level of Assistance: Minimum assistance  Static Standing Balance  Static Standing-Balance Support: Bilateral upper extremity supported (B arm in arm, blocking to prevent knee buckling.)  Static Standing-Level of Assistance: Maximum assistance (x2)  Static Standing-Comment/Number of Minutes: able to achieve ~50% stand, blocking to prevent knee buckling, heavy retropulsive lean.    Functional Assessments:     Bed Mobility  Bed Mobility: Yes  Bed Mobility 1  Bed Mobility 1: Supine to sitting, Sitting to supine  Level of Assistance 1: Dependent, +2  Bed Mobility Comments 1: use of draw sheet to pivot patient EOB. Unable to initiate/participate in skilled transfer  Bed Mobility 2  Bed Mobility  2: Scooting  Level of Assistance 2: Dependent, +2  Bed Mobility Comments 2: boost with draw sheet to reposition in bed at end of session.  Transfers  Transfer: Yes  Transfer 1  Technique 1: Sit to stand, Stand to sit  Transfer Device 1:  (B arm in arm A, blocking to prevent knee buckling)  Transfer Level of Assistance 1: Maximum assistance, +2  Trials/Comments 1: achieved 50% standing, +soft knees, poor tolerance.  Ambulation/Gait Training  Ambulation/Gait Training Performed: " No  Stairs  Stairs: No         Outcome Measures:     [facility] Basic Mobility  Turning from your back to your side while in a flat bed without using bedrails: A lot  Moving from lying on your back to sitting on the side of a flat bed without using bedrails: Total  Moving to and from bed to chair (including a wheelchair): Total  Standing up from a chair using your arms (e.g. wheelchair or bedside chair): A lot  To walk in hospital room: Total  Climbing 3-5 steps with railing: Total  Basic Mobility - Total Score: 8           Goals:  Encounter Problems       Encounter Problems (Active)       PT Problem       Pt. will perform bed mobility with mod A x 1        Start:  06/09/25    Expected End:  06/23/25            Pt. will perform transfers with LRD with mod A x 1        Start:  06/09/25    Expected End:  06/23/25            Pt. will require SBA for static/dynamic sitting balance to safely participate in ADLs        Start:  06/09/25    Expected End:  06/23/25            Pt. will participate in BLE strengthening HEP        Start:  06/09/25    Expected End:  06/23/25                     Education Documentation  Body Mechanics, taught by Paola Urias, PT at 6/9/2025 12:23 PM.  Learner: Patient  Readiness: Acceptance  Method: Explanation  Response: No Evidence of Learning, Needs Reinforcement    Mobility Training, taught by Paola Urias, PT at 6/9/2025 12:23 PM.  Learner: Patient  Readiness: Acceptance  Method: Explanation  Response: No Evidence of Learning, Needs Reinforcement    Education Comments  No comments found.

## 2025-06-09 NOTE — CONSULTS
Consults    Reason For Consult  Goals of care, possible code status change     History Of Present Illness  Bora Moreno is a 89 y.o. female with past medical history of CAD, HTN, HLD, PVD, mitral regurg, JAMAR, COPD, CKD stage IV, anemia and afib not on anticoagulation.  She tripped and fell and presented to ED where she was found to have left humerus fracture.  She was discharged from the ED and told to follow up with orthopedics.  The patient was a home and could not get up off the couch so her spouse called 911.  Her hgb was found to be 5.6.  She was transfused with 2uprbc's.  All other imaging unremarkable.  GI saw and evaluated patient, declined colonscopy and recommended goals of care discussion as she appears to be declining.  Palliative care consulted for assistance with goals of care and code status change    ROS: unable to obtain, pt currently nonverbal    Personal/Social History  She reports that she quit smoking about 54 years ago. Her smoking use included cigarettes. She started smoking about 69 years ago. She has never been exposed to tobacco smoke. She has never used smokeless tobacco. She reports that she does not drink alcohol and does not use drugs.    Past Medical History  She has a past medical history of COPD (chronic obstructive pulmonary disease) (Multi), High blood cholesterol, Hypertension, and Personal history of other diseases of the circulatory system (07/29/2022).    Surgical History  She has a past surgical history that includes Other surgical history (04/29/2019); Other surgical history (09/25/2021); Other surgical history (09/25/2021); Other surgical history (09/25/2021); Other surgical history (10/20/2021); Cataract extraction (Right); and Cardiac catheterization (Left).     Family History  Family History[1]  Allergies  Captopril and Influenza virus vaccines     Physical Exam  GEN: cachectic ill appearing elderly woman, very lethargic  HEENT: MMM  CV: RRR  RESP: even and  "regular  ABD: soft, nt/nd  MSK: moves all ext; left arm immobilized  NEURO: +weakness, does not follow commands  PSYCH: impaired memory but currently calm    Last Recorded Vitals  Blood pressure 107/51, pulse 101, temperature 36.8 °C (98.2 °F), resp. rate 16, height 1.702 m (5' 7\"), weight (!) 44.4 kg (97 lb 14.2 oz), SpO2 93%.    Relevant Results  Scheduled medications  Scheduled Medications[2]  Continuous medications  Continuous Medications[3]  PRN medications  PRN Medications[4]    Results for orders placed or performed during the hospital encounter of 06/07/25 (from the past 24 hours)   Basic Metabolic Panel   Result Value Ref Range    Glucose 69 (L) 74 - 99 mg/dL    Sodium 142 136 - 145 mmol/L    Potassium 5.2 3.5 - 5.3 mmol/L    Chloride 114 (H) 98 - 107 mmol/L    Bicarbonate 14 (L) 21 - 32 mmol/L    Anion Gap 19 10 - 20 mmol/L    Urea Nitrogen 47 (H) 6 - 23 mg/dL    Creatinine 2.47 (H) 0.50 - 1.05 mg/dL    eGFR 18 (L) >60 mL/min/1.73m*2    Calcium 8.4 (L) 8.6 - 10.3 mg/dL   CBC   Result Value Ref Range    WBC 8.5 4.4 - 11.3 x10*3/uL    nRBC 0.0 0.0 - 0.0 /100 WBCs    RBC 2.58 (L) 4.00 - 5.20 x10*6/uL    Hemoglobin 8.4 (L) 12.0 - 16.0 g/dL    Hematocrit 26.3 (L) 36.0 - 46.0 %     (H) 80 - 100 fL    MCH 32.6 26.0 - 34.0 pg    MCHC 31.9 (L) 32.0 - 36.0 g/dL    RDW 21.2 (H) 11.5 - 14.5 %    Platelets 168 150 - 450 x10*3/uL   Magnesium   Result Value Ref Range    Magnesium 1.65 1.60 - 2.40 mg/dL   SST TOP   Result Value Ref Range    Extra Tube Hold for add-ons.    POCT GLUCOSE   Result Value Ref Range    POCT Glucose 74 74 - 99 mg/dL          Assessment/Plan   -Met with patient, spouse and pt's daughter Keeley by speaker phone.  Keeley will be coming from [place] today.  At baseline patient struggles with dates and long term memory is intact but short term is more difficult.  Per family pt is ambulatory, toilets herself, feeds herself and is able to get out of the house with assistance of spouse.  Spouse takes " her everywhere with him since she's not able to be left home alone.  Currently she is lethargic but was able to participate with PT today.  She has not eaten or taken any liquids in in the past 24hrs.  Pt spouse told previous provider he would want all intervention undertaken in the event of cardiopulmonary arrest.  Discussed risks of CPR in someone with advanced dementia and severe malnutrition.  Daughter verbalized understanding.  They will continue discussions upon her arrival.  Morphine stopped as this may be contributing to current condition.      I spent 65 minutes in the professional and overall care of this patient.      Ruth Ann Thao, APRN-CNP         [1]   Family History  Problem Relation Name Age of Onset    Stroke Father      Other (cerebrovascular accident) Father          cerebrovascular accident (CVA)    Coronary artery disease Father     [2] atorvastatin, 20 mg, oral, Nightly  cefTRIAXone, 1 g, intravenous, q24h  donepezil, 10 mg, oral, Nightly  fluticasone furoate-vilanteroL, 1 puff, inhalation, Daily  lidocaine, 1 patch, transdermal, Daily  montelukast, 10 mg, oral, Daily  polyethylene glycol, 17 g, oral, Daily  [Held by provider] valsartan 80 mg, hydroCHLOROthiazide 12.5 mg for Diovan HCT, , oral, Daily  [3] sodium chloride 0.9%, 75 mL/hr  [4] PRN medications: acetaminophen **OR** acetaminophen **OR** acetaminophen, ondansetron **OR** ondansetron, oxyCODONE

## 2025-06-09 NOTE — PROGRESS NOTES
Subjective: No events overnight. Patient not able to participate much in exam.      Review of Systems  ROS Negative unless otherwise stated above.    Allergies  RX Allergies[1]    Medications  Current Outpatient Medications   Medication Instructions    aspirin 81 mg, Daily    atorvastatin (LIPITOR) 20 mg, oral, Nightly    donepezil (ARICEPT) 10 mg, oral, Nightly    fluticasone furoate-vilanteroL (Breo Ellipta) 200-25 mcg/dose inhaler inhalation, Daily    HYDROcodone-acetaminophen (Norco) 5-325 mg tablet 1 tablet, oral, Every 4 hours PRN    memantine (NAMENDA) 5 mg, oral, 2 times daily    montelukast (SINGULAIR) 10 mg, oral, Daily    valsartan-hydrochlorothiazide (Diovan-HCT) 80-12.5 mg tablet 1 tablet, oral, Daily        PHYSICAL EXAM:  Visit Vitals  /51   Pulse 101   Temp 36.8 °C (98.2 °F)   Resp 16        General:Cachectic, frail appearing.  HEENT: AT/NC.   Skin: No Jaundice.   Neuro: No focal deficits.   Psych: Normal mood and affect.       Results from last 7 days   Lab Units 06/09/25 0414 06/08/25  0530 06/07/25 2011   WBC AUTO x10*3/uL 8.5 6.1 6.4   HEMOGLOBIN g/dL 8.4* 9.1* 9.2*   HEMATOCRIT % 26.3* 28.6* 27.7*   PLATELETS AUTO x10*3/uL 168 160 156     Results from last 7 days   Lab Units 06/09/25  0414 06/08/25  0530 06/07/25  1053   SODIUM mmol/L 142 138 138   POTASSIUM mmol/L 5.2 5.5* 5.2   CHLORIDE mmol/L 114* 112* 108*   CO2 mmol/L 14* 16* 18*   BUN mg/dL 47* 48* 53*   CREATININE mg/dL 2.47* 2.37* 2.57*   CALCIUM mg/dL 8.4* 8.0* 8.4*   PROTEIN TOTAL g/dL  --   --  8.8*   BILIRUBIN TOTAL mg/dL  --   --  0.3   ALK PHOS U/L  --   --  85   ALT U/L  --   --  8   AST U/L  --   --  10   GLUCOSE mg/dL 69* 94 110*         ASSESSMENT/PLAN:  Bora Moreno is a 89 y.o. female with a PMH of CAD, PVD, CKD IV, HTN, HLD, TAVR, cachexia, COPD, former tobacco use, and Afib (not on AC) who presented the hospital with weakness after recent ER visit fall c/b left humerus fracture and was found to be anemic in  whom we are consulted for further management.      Patient with iron deficiency anemia.  Hemoglobin: 5.6 --> 9/2 --> 8.4 s/p 2u PRBCs.  There is no concern for overt GI bleeding.  Patient has previously adamantly refused endoscopic evaluation, but  is going to confirm this.  I agree with this given her age and comorbidities.  Supportive care. Goals of care needs addressed.  Avoid NSAIDs.  Prophylactic PPI.          Mike Cabrera, DO  GI Attending         [1]   Allergies  Allergen Reactions    Captopril Unknown     rhinitis    Influenza Virus Vaccines Nausea And Vomiting

## 2025-06-10 LAB
ANION GAP SERPL CALC-SCNC: 17 MMOL/L (ref 10–20)
BACTERIA UR CULT: ABNORMAL
BUN SERPL-MCNC: 55 MG/DL (ref 6–23)
CALCIUM SERPL-MCNC: 8.6 MG/DL (ref 8.6–10.3)
CHLORIDE SERPL-SCNC: 118 MMOL/L (ref 98–107)
CO2 SERPL-SCNC: 15 MMOL/L (ref 21–32)
CREAT SERPL-MCNC: 2.73 MG/DL (ref 0.5–1.05)
EGFRCR SERPLBLD CKD-EPI 2021: 16 ML/MIN/1.73M*2
ERYTHROCYTE [DISTWIDTH] IN BLOOD BY AUTOMATED COUNT: 20.5 % (ref 11.5–14.5)
GLUCOSE SERPL-MCNC: 86 MG/DL (ref 74–99)
HCT VFR BLD AUTO: 26.2 % (ref 36–46)
HGB BLD-MCNC: 8.1 G/DL (ref 12–16)
MAGNESIUM SERPL-MCNC: 1.88 MG/DL (ref 1.6–2.4)
MCH RBC QN AUTO: 32.5 PG (ref 26–34)
MCHC RBC AUTO-ENTMCNC: 30.9 G/DL (ref 32–36)
MCV RBC AUTO: 105 FL (ref 80–100)
NRBC BLD-RTO: 0 /100 WBCS (ref 0–0)
PLATELET # BLD AUTO: 138 X10*3/UL (ref 150–450)
POTASSIUM SERPL-SCNC: 5.2 MMOL/L (ref 3.5–5.3)
RBC # BLD AUTO: 2.49 X10*6/UL (ref 4–5.2)
SODIUM SERPL-SCNC: 145 MMOL/L (ref 136–145)
WBC # BLD AUTO: 9.7 X10*3/UL (ref 4.4–11.3)

## 2025-06-10 PROCEDURE — 97530 THERAPEUTIC ACTIVITIES: CPT | Mod: GP,CQ

## 2025-06-10 PROCEDURE — 80048 BASIC METABOLIC PNL TOTAL CA: CPT

## 2025-06-10 PROCEDURE — 1200000002 HC GENERAL ROOM WITH TELEMETRY DAILY

## 2025-06-10 PROCEDURE — 36415 COLL VENOUS BLD VENIPUNCTURE: CPT

## 2025-06-10 PROCEDURE — 2500000004 HC RX 250 GENERAL PHARMACY W/ HCPCS (ALT 636 FOR OP/ED)

## 2025-06-10 PROCEDURE — 2500000004 HC RX 250 GENERAL PHARMACY W/ HCPCS (ALT 636 FOR OP/ED): Performed by: INTERNAL MEDICINE

## 2025-06-10 PROCEDURE — 92610 EVALUATE SWALLOWING FUNCTION: CPT | Mod: GN | Performed by: SPEECH-LANGUAGE PATHOLOGIST

## 2025-06-10 PROCEDURE — 2500000001 HC RX 250 WO HCPCS SELF ADMINISTERED DRUGS (ALT 637 FOR MEDICARE OP): Performed by: STUDENT IN AN ORGANIZED HEALTH CARE EDUCATION/TRAINING PROGRAM

## 2025-06-10 PROCEDURE — 99233 SBSQ HOSP IP/OBS HIGH 50: CPT | Performed by: NURSE PRACTITIONER

## 2025-06-10 PROCEDURE — 99233 SBSQ HOSP IP/OBS HIGH 50: CPT

## 2025-06-10 PROCEDURE — 85027 COMPLETE CBC AUTOMATED: CPT

## 2025-06-10 PROCEDURE — 2500000005 HC RX 250 GENERAL PHARMACY W/O HCPCS: Performed by: NURSE PRACTITIONER

## 2025-06-10 PROCEDURE — 83735 ASSAY OF MAGNESIUM: CPT

## 2025-06-10 PROCEDURE — 97112 NEUROMUSCULAR REEDUCATION: CPT | Mod: GO,CO

## 2025-06-10 RX ORDER — MORPHINE SULFATE 2 MG/ML
1 INJECTION, SOLUTION INTRAMUSCULAR; INTRAVENOUS ONCE
Status: COMPLETED | OUTPATIENT
Start: 2025-06-10 | End: 2025-06-10

## 2025-06-10 RX ORDER — CIPROFLOXACIN 2 MG/ML
400 INJECTION, SOLUTION INTRAVENOUS EVERY 12 HOURS
Status: DISCONTINUED | OUTPATIENT
Start: 2025-06-10 | End: 2025-06-11

## 2025-06-10 RX ORDER — PANTOPRAZOLE SODIUM 40 MG/1
40 TABLET, DELAYED RELEASE ORAL
Status: DISCONTINUED | OUTPATIENT
Start: 2025-06-11 | End: 2025-06-13 | Stop reason: HOSPADM

## 2025-06-10 RX ORDER — PANTOPRAZOLE SODIUM 40 MG/10ML
40 INJECTION, POWDER, LYOPHILIZED, FOR SOLUTION INTRAVENOUS
Status: DISCONTINUED | OUTPATIENT
Start: 2025-06-11 | End: 2025-06-13 | Stop reason: HOSPADM

## 2025-06-10 RX ORDER — SODIUM CHLORIDE 9 MG/ML
75 INJECTION, SOLUTION INTRAVENOUS CONTINUOUS
Status: ACTIVE | OUTPATIENT
Start: 2025-06-10 | End: 2025-06-11

## 2025-06-10 RX ORDER — OLANZAPINE 10 MG/2ML
2.5 INJECTION, POWDER, FOR SOLUTION INTRAMUSCULAR EVERY 6 HOURS PRN
Status: DISCONTINUED | OUTPATIENT
Start: 2025-06-10 | End: 2025-06-13 | Stop reason: HOSPADM

## 2025-06-10 RX ADMIN — OLANZAPINE 2.5 MG: 10 INJECTION, POWDER, FOR SOLUTION INTRAMUSCULAR at 15:55

## 2025-06-10 RX ADMIN — CEFTRIAXONE 1 G: 1 INJECTION, SOLUTION INTRAVENOUS at 08:22

## 2025-06-10 RX ADMIN — SODIUM CHLORIDE 75 ML/HR: 0.9 INJECTION, SOLUTION INTRAVENOUS at 15:54

## 2025-06-10 RX ADMIN — OXYCODONE 5 MG: 5 TABLET ORAL at 06:18

## 2025-06-10 RX ADMIN — CIPROFLOXACIN 400 MG: 400 INJECTION, SOLUTION INTRAVENOUS at 15:55

## 2025-06-10 RX ADMIN — FLUTICASONE FUROATE AND VILANTEROL TRIFENATATE 1 PUFF: 200; 25 POWDER RESPIRATORY (INHALATION) at 08:21

## 2025-06-10 RX ADMIN — LIDOCAINE 1 PATCH: 4 PATCH TOPICAL at 08:21

## 2025-06-10 RX ADMIN — MORPHINE SULFATE 1 MG: 2 INJECTION, SOLUTION INTRAMUSCULAR; INTRAVENOUS at 06:56

## 2025-06-10 RX ADMIN — MONTELUKAST SODIUM 10 MG: 10 TABLET, FILM COATED ORAL at 08:21

## 2025-06-10 ASSESSMENT — COGNITIVE AND FUNCTIONAL STATUS - GENERAL
DRESSING REGULAR LOWER BODY CLOTHING: TOTAL
PERSONAL GROOMING: TOTAL
HELP NEEDED FOR BATHING: TOTAL
STANDING UP FROM CHAIR USING ARMS: TOTAL
DAILY ACTIVITIY SCORE: 6
TOILETING: TOTAL
PERSONAL GROOMING: TOTAL
DRESSING REGULAR LOWER BODY CLOTHING: TOTAL
CLIMB 3 TO 5 STEPS WITH RAILING: TOTAL
MOVING FROM LYING ON BACK TO SITTING ON SIDE OF FLAT BED WITH BEDRAILS: A LOT
DRESSING REGULAR UPPER BODY CLOTHING: TOTAL
DRESSING REGULAR LOWER BODY CLOTHING: TOTAL
DAILY ACTIVITIY SCORE: 6
HELP NEEDED FOR BATHING: TOTAL
MOBILITY SCORE: 6
MOVING TO AND FROM BED TO CHAIR: TOTAL
DRESSING REGULAR UPPER BODY CLOTHING: TOTAL
MOVING FROM LYING ON BACK TO SITTING ON SIDE OF FLAT BED WITH BEDRAILS: TOTAL
TOILETING: TOTAL
TURNING FROM BACK TO SIDE WHILE IN FLAT BAD: TOTAL
CLIMB 3 TO 5 STEPS WITH RAILING: TOTAL
EATING MEALS: TOTAL
MOBILITY SCORE: 8
WALKING IN HOSPITAL ROOM: TOTAL
CLIMB 3 TO 5 STEPS WITH RAILING: TOTAL
MOBILITY SCORE: 8
TURNING FROM BACK TO SIDE WHILE IN FLAT BAD: A LOT
TURNING FROM BACK TO SIDE WHILE IN FLAT BAD: A LOT
DAILY ACTIVITIY SCORE: 8
EATING MEALS: A LOT
WALKING IN HOSPITAL ROOM: TOTAL
MOVING TO AND FROM BED TO CHAIR: TOTAL
HELP NEEDED FOR BATHING: TOTAL
MOVING TO AND FROM BED TO CHAIR: TOTAL
PERSONAL GROOMING: A LOT
EATING MEALS: TOTAL
MOVING FROM LYING ON BACK TO SITTING ON SIDE OF FLAT BED WITH BEDRAILS: A LOT
TOILETING: TOTAL
DRESSING REGULAR UPPER BODY CLOTHING: TOTAL
WALKING IN HOSPITAL ROOM: TOTAL
STANDING UP FROM CHAIR USING ARMS: TOTAL
STANDING UP FROM CHAIR USING ARMS: TOTAL

## 2025-06-10 ASSESSMENT — PAIN - FUNCTIONAL ASSESSMENT
PAIN_FUNCTIONAL_ASSESSMENT: WONG-BAKER FACES
PAIN_FUNCTIONAL_ASSESSMENT: FLACC (FACE, LEGS, ACTIVITY, CRY, CONSOLABILITY)
PAIN_FUNCTIONAL_ASSESSMENT: WONG-BAKER FACES
PAIN_FUNCTIONAL_ASSESSMENT: FLACC (FACE, LEGS, ACTIVITY, CRY, CONSOLABILITY)

## 2025-06-10 ASSESSMENT — PAIN SCALES - WONG BAKER
WONGBAKER_NUMERICALRESPONSE: HURTS WHOLE LOT
WONGBAKER_NUMERICALRESPONSE: NO HURT
WONGBAKER_NUMERICALRESPONSE: HURTS WHOLE LOT

## 2025-06-10 NOTE — PROGRESS NOTES
06/10/25 1302   Discharge Planning   Living Arrangements Spouse/significant other   Support Systems Spouse/significant other   Assistance Needed SNF   Type of Residence Private residence   Do you have animals or pets at home? No   Who is requesting discharge planning? Provider   Home or Post Acute Services Post acute facilities (Rehab/SNF/etc)   Type of Post Acute Facility Services Skilled nursing   Expected Discharge Disposition SNF   Does the patient need discharge transport arranged? Yes   RoundTrip coordination needed? Yes   Has discharge transport been arranged? No   Patient Choice   Provider Choice list and CMS website (https://medicare.gov/care-compare#search) for post-acute Quality and Resource Measure Data were provided and reviewed with: Patient   Patient / Family choosing to utilize agency / facility established prior to hospitalization No   Stroke Family Assessment   Stroke Family Assessment Needed No   Intensity of Service   Intensity of Service 0-30 min     Patient admitted for fall at home, fractured left shoulder, patient is to weak and medically unstable for surgery, ortho treating conservatively. Patient lives with spouse, plan will be SNF as patient unable to move left arm. Provided patient's family with list of facilities, they would like [facility]. Referral made, if they can accept, patient can go once medically stable, she has met her three midnights. CT team will continue to follow.

## 2025-06-10 NOTE — PROGRESS NOTES
Physical Therapy    Physical Therapy Treatment    Patient Name: Bora Moreno  MRN: [id number]  Today's Date: 6/10/2025  Time Calculation  Start Time: 1037  Stop Time: 1105  Time Calculation (min): 28 min     1025/1025-A    Assessment/Plan   Barriers to Discharge Home: Caregiver assistance, Cognition needs, Physical needs    Caregiver Assistance: Caregiver assistance needed per identified barriers - however, level of patient's required assistance exceeds assistance available at home    Physical Needs: Ambulating household distances limited by function/safety, 24hr ADL assistance needed, High falls risk due to function or environment    End of Session Communication: Bedside nurse    Assessment Comment: Patient currently dependent with all functional mobility compared to baseline. She would benefit from skilled PT  to address these deficits.    End of Session Patient Position:  (Patient reclined in chair with pillows for positioning and support of LUE. Cold pack to shoulder. Call light/tray in reach. Chair alarm on. Warm blanket provided. Family at bedside.)    PT Plan  Inpatient/Swing Bed or Outpatient: Inpatient  Treatment/Interventions: Bed mobility, Transfer training, Gait training, Balance training, Strengthening, Endurance training, Therapeutic exercise, Therapeutic activity, Home exercise program  PT Plan: Ongoing PT  PT Frequency: 3 times per week  PT Discharge Recommendations: Moderate intensity level of continued care    PT Recommended Transfer Status: Assist x2    General Visit Information:   PT  Visit  PT Received On: 06/10/25    General  Family/Caregiver Present: Yes  Caregiver Feedback:  (Patient's spouse and daughter at bedside when therapy arrived.  Stepped out of room during majority of treatment.)  Co-Treatment: OT  Co-Treatment Reason:  (co-treatment performed with OT to maximize patient safety and function. Patient currently requires assist of two for mobility/transfers. One unit billed for  "PT)  Prior to Session Communication:  (Cleared by RN for PT.  She reports patient was medicated earlier this morning with morphine)  Patient Position Received:  (Patient presents in bed, awake, but confused.)    General Comment:  (Dx: frequent falls resulting in non-operative left humeral fracture)    General Observations:   General Observation:  (tele; purwick; alarm  LUE edematous and discolored in appearance    Subjective \"No, no, no\"    Precautions:  Precautions  UE Weight Bearing Status:  (LUE NWB)  Medical Precautions: Fall precautions  Braces Applied: Patient wearing left shoulder immobilizer when therapy arrived.  OT adjusted immobilizer and loosened it somewhat as it was very tight and causing friction agaisnt patient's skin. Simple sling donned by OT while sitting EOB  Precautions Comment: No range of motion to the left shoulder.  Okay for range of motion to elbow, wrist, and hand (Per ortho NP)      Pain:  Pain Assessment  [place] FACES Pain Rating: Hurts whole lot  Pain Location:  (left UE)    Cognition:  Cognition  Overall Cognitive Status: Impaired (Patient following <10% of commands)  Orientation Level:  (Patient able to state name and .  Not able to answer any other questions.)  Attention:  (impaired)  Insight: Severe      Balance:   Static Sitting Balance  Static Sitting-Balance Support: Feet supported  Static Sitting-Level of Assistance:  (Poor static sit. Patient with heavy left lateral and posterior lean.  Fwd flexed posture. Required max x1 to maintain midline and to adhere to LUE NWB.)  Static Sitting-Comment/Number of Minutes:  (10 minutes sitting EOB)    Dynamic Sitting Balance  Dynamic Sitting-Balance:  (poor dynamic sit)    Dynamic Standing Balance  Dynamic Standing-Balance:  (absent stand balance)         Activity Tolerance:  Activity Tolerance  Endurance:  (poor tolerance to activity)       Bed Mobility  Bed Mobility:  (supine->sit: dependent  Bed pads used to maneuver LE's and " trunk into upright position. Patient yelling out with movement.)    Ambulation/Gait Training  Ambulation/Gait Training Performed:  (bed->chair: dependent pivot transfer using gait belt.  Patient yelling out and attempting to swat at therapists    Transfers  Transfer:  (sit->stand: dependent.  Height of bed raised slightly, use of gait belt for safety.)             Outcome Measures:   [facility] Basic Mobility  Turning from your back to your side while in a flat bed without using bedrails: Total  Moving from lying on your back to sitting on the side of a flat bed without using bedrails: Total  Moving to and from bed to chair (including a wheelchair): Total  Standing up from a chair using your arms (e.g. wheelchair or bedside chair): Total  To walk in hospital room: Total  Climbing 3-5 steps with railing: Total  Basic Mobility - Total Score: 6           Education Documentation  Mobility Training, taught by Milady Posada PTA at 6/10/2025 12:00 PM.  Learner: Patient  Readiness: Nonacceptance  Method: Explanation, Demonstration  Response: No Evidence of Learning, Needs Reinforcement    Education Comments  Individual(s) Educated: Patient  Diagnosis and Precautions:  (Attempted to educate patient on WB precautions of LUE; patient very confused and was not able to recall education)    Encounter Problems       Encounter Problems (Active)       PT Problem       Pt. will perform bed mobility with mod A x 1  (Progressing)       Start:  06/09/25    Expected End:  06/23/25            Pt. will perform transfers with LRD with mod A x 1  (Progressing)       Start:  06/09/25    Expected End:  06/23/25            Pt. will require SBA for static/dynamic sitting balance to safely participate in ADLs  (Progressing)       Start:  06/09/25    Expected End:  06/23/25            Pt. will participate in BLE strengthening HEP  (Not Progressing)       Start:  06/09/25    Expected End:  06/23/25               Pain - Adult

## 2025-06-10 NOTE — PROGRESS NOTES
Occupational Therapy    OT Treatment    Patient Name: Bora Moreno  MRN: [id number]  Department: [facility]  Room: [address] [place] [address]  Today's Date: 6/10/2025  Time Calculation  Start Time: 1038  Stop Time: 1108  Time Calculation (min): 30 min        Assessment:  End of Session Communication: Bedside nurse  End of Session Patient Position:  (Patient reclined in chair with pillows for positioning and support of LUE. Cold pack to shoulder. Call light/tray in reach. Chair alarm on. Warm blanket provided. Family at bedside.)     Plan:  Treatment Interventions: ADL retraining, Functional transfer training, Endurance training  OT Frequency: 2 times per week  OT Discharge Recommendations: Moderate intensity level of continued care, 24 hr supervision due to cognition  OT Recommended Transfer Status: Maximum assist, Assist of 2  OT - OK to Discharge: Yes (when medically stable/cleared)  Treatment Interventions: ADL retraining, Functional transfer training, Endurance training    Subjective   OT Visit Info:     General Visit Info:  General  Reason for Referral: Dx: frequent falls resulting in non-operative left humeral fracture  Family/Caregiver Present: Yes  Caregiver Feedback:  (Patient's spouse and daughter at bedside when therapy arrived.  Stepped out of room during majority of treatment.)  Co-Treatment: PT  Co-Treatment Reason:  (co-treatment performed with PT to maximize patient safety and function. Patient currently requires assist of two for mobility/transfers.)  Prior to Session Communication:  (Cleared by RN for OT.  She reports patient was medicated earlier this morning with morphine)  Patient Position Received:  (Patient presents in bed, awake, but confused.)  General Comment: Pt agreeable to OT, pleasantly confused. Pt becomes slightly agitated with movement of LUE (elbow/wrist)to don sling/gown  Precautions:  UE Weight Bearing Status:  (LUE NWB)  Medical Precautions: Fall precautions  Braces Applied: Patient wearing left  shoulder immobilizer when therapy arrived.  OT adjusted immobilizer and loosened it somewhat as it was very tight and causing friction agaisnt patient's skin. Simple sling donned by OT while sitting EOB. L UE edematous with significant bruising  Precautions Comment: No range of motion to the left shoulder.  Okay for range of motion to elbow, wrist, and hand (Per ortho NP)    Pain:  Pain Assessment  Pain Assessment: Harrington-Baker FACES  Harrington-Baker FACES Pain Rating: Hurts whole lot  Pain Location:  (L UE)  Pain Interventions:  (ice, readjusted sling for fit, pillow under arm for positioning and comfort.)    Objective    Cognition:  Cognition  Overall Cognitive Status: Impaired  Orientation Level:  (Patient able to state name and .  Not able to answer any other questions.)  Attention:  (impaired)  Insight: Severe       Activities of Daily Living: UE Dressing  UE Dressing Comments: Total assist to don gown and sling d/t pt not following commands to complete       Bed Mobility/Transfers: Bed Mobility  Bed Mobility:  (supine->sit: dependent  Bed pads used to manuever LE's and trunk into upright position. Patient yelling out with movement.)    Transfers  Transfer:  (sit->stand: dependent.  Height of bed raised slighlty, use of gait belt for safety.)      Therapy/Activity: Balance/Neuromuscular Re-Education  Balance/Neuromuscular Re-Education Activity Performed:  (EOB sitting x 10 min with poor+/poor balance.  Posterior and left lateral forward flexed posture posture.  Max assist needed for midline positioning.  Pt educated on importance of maintaining NWB restrictions L UE with static sit,however pt demo'd poor understanding and awareness when pt does attempt to WB)    Outcome Measures:[facility] Daily Activity  Putting on and taking off regular lower body clothing: Total  Bathing (including washing, rinsing, drying): Total  Putting on and taking off regular upper body clothing: Total  Toileting, which includes using toilet,  bedpan or urinal: Total  Taking care of personal grooming such as brushing teeth: A lot  Eating Meals: A lot  Daily Activity - Total Score: 8        Education Documentation  Precautions, taught by Jennifer L Felty, OTA at 6/10/2025  3:32 PM.  Learner: Family  Readiness: Acceptance  Method: Explanation, Demonstration  Response: Verbalizes Understanding      EDUCATION: Pt and family educated on NWB restrictions and positioning for comfort       Goals:  Encounter Problems       Encounter Problems (Active)       OT Goals       Mod A for UB dressing  (Progressing)       Start:  06/09/25    Expected End:  06/23/25            Mod A for grooming tasks and clothing mgmt  (Progressing)       Start:  06/09/25    Expected End:  06/23/25            Mod A for all functional transfers (Progressing)       Start:  06/09/25    Expected End:  06/23/25            Pt. will follow commands in 1/3 trials during ADLs/functional tasks  (Progressing)       Start:  06/09/25    Expected End:  06/23/25

## 2025-06-10 NOTE — PROGRESS NOTES
Per maria teresa-jeffrey pt. Will need snf setting on discharge, has spoken to spouse and dtr. Who are requesting  ave nr.  Referral sent.  Pt. With medicare will not require ins. Precert.

## 2025-06-10 NOTE — PROGRESS NOTES
"Daily Progress Note    Bora Moreno is a 89 y.o. female on day 3 of admission presenting with Frequent falls.    Subjective   Patient seen and examined, sitting in bedside chair with  and daughter at bedside.  Patient is nonverbal today but according to family is more alert than she has been previously.  Palliative care following, family okay with changing CODE STATUS to DNR/DNI and no ICU care.  SLP following, recommending n.p.o. until patient becomes more alert.  Per PT/OT recommendations, patient will need placement on discharge.       Objective   Physical Exam  Constitutional:       Appearance: She is cachectic. She is ill-appearing.   HENT:      Head: Normocephalic.      Mouth/Throat:      Mouth: Mucous membranes are dry.   Eyes:      Pupils: Pupils are equal, round, and reactive to light.   Cardiovascular:      Rate and Rhythm: Normal rate and regular rhythm.      Heart sounds: Normal heart sounds, S1 normal and S2 normal.   Pulmonary:      Effort: Pulmonary effort is normal.      Breath sounds: Normal breath sounds.   Abdominal:      General: Bowel sounds are normal.      Palpations: Abdomen is soft.      Tenderness: There is no abdominal tenderness.   Musculoskeletal:      Cervical back: Neck supple.      Right lower leg: No edema.      Left lower leg: No edema.      Comments: LUE in sling   Skin:     General: Skin is warm.      Findings: Bruising present.   Neurological:      Motor: Weakness present.      Comments: Did not follow commands   Psychiatric:         Speech: She is noncommunicative.         Behavior: Behavior is withdrawn.         Cognition and Memory: Cognition is impaired. Memory is impaired.         Last Recorded Vitals  Blood pressure 152/67, pulse 95, temperature 37.3 °C (99.1 °F), temperature source Temporal, resp. rate 18, height 1.702 m (5' 7\"), weight (!) 44.4 kg (97 lb 14.2 oz), SpO2 100%.  Intake/Output last 3 Shifts:  I/O last 3 completed shifts:  In: 2016.8 (45.4 mL/kg) " [P.O.:200; I.V.:1766.8 (39.8 mL/kg); IV Piggyback:50]  Out: 500 (11.3 mL/kg) [Urine:500 (0.3 mL/kg/hr)]  Weight: 44.4 kg     Medications  Scheduled medications  Scheduled Medications[1]  Continuous medications  Continuous Medications[2]  PRN medications  PRN Medications[3]    Labs  CBC:   Results from last 7 days   Lab Units 06/10/25  0406 06/09/25  0414 06/08/25  0530   WBC AUTO x10*3/uL 9.7 8.5 6.1   RBC AUTO x10*6/uL 2.49* 2.58* 2.77*   HEMOGLOBIN g/dL 8.1* 8.4* 9.1*   HEMATOCRIT % 26.2* 26.3* 28.6*   MCV fL 105* 102* 103*   MCH pg 32.5 32.6 32.9   MCHC g/dL 30.9* 31.9* 31.8*   RDW % 20.5* 21.2* 22.3*   PLATELETS AUTO x10*3/uL 138* 168 160     CMP:    Results from last 7 days   Lab Units 06/10/25  0406 06/09/25  0414 06/08/25  0530 06/07/25  1053   SODIUM mmol/L 145 142 138 138   POTASSIUM mmol/L 5.2 5.2 5.5* 5.2   CHLORIDE mmol/L 118* 114* 112* 108*   CO2 mmol/L 15* 14* 16* 18*   BUN mg/dL 55* 47* 48* 53*   CREATININE mg/dL 2.73* 2.47* 2.37* 2.57*   GLUCOSE mg/dL 86 69* 94 110*   PROTEIN TOTAL g/dL  --   --   --  8.8*   CALCIUM mg/dL 8.6 8.4* 8.0* 8.4*   BILIRUBIN TOTAL mg/dL  --   --   --  0.3   ALK PHOS U/L  --   --   --  85   AST U/L  --   --   --  10   ALT U/L  --   --   --  8     BMP:    Results from last 7 days   Lab Units 06/10/25  0406 06/09/25 0414 06/08/25  0530   SODIUM mmol/L 145 142 138   POTASSIUM mmol/L 5.2 5.2 5.5*   CHLORIDE mmol/L 118* 114* 112*   CO2 mmol/L 15* 14* 16*   BUN mg/dL 55* 47* 48*   CREATININE mg/dL 2.73* 2.47* 2.37*   CALCIUM mg/dL 8.6 8.4* 8.0*   GLUCOSE mg/dL 86 69* 94     Magnesium:  Results from last 7 days   Lab Units 06/10/25  0406 06/09/25  0414 06/08/25  0530   MAGNESIUM mg/dL 1.88 1.65 1.78     Troponin:    Results from last 7 days   Lab Units 06/07/25  1153 06/07/25  1053   TROPHS ng/L 23* 26*     BNP:   Results from last 7 days   Lab Units 06/07/25  1053   BNP pg/mL 275*     Lipid Panel:  Results from last 7 days   Lab Units 06/07/25  1053   INR  1.3*   PROTIME seconds  "14.0*        Nutrition     Malnutrition Diagnosis Status: New  Malnutrition Diagnosis: Severe malnutrition related to chronic disease or condition  Related to: COPD with other chronic conditions  As Evidenced by: severe muscle wasting, severe fat loss, suspect pt meeting <75% of estimated energy needs  I agree with the dietitian's malnutrition diagnosis.          Relevant Results  Results from last 7 days   Lab Units 06/10/25  0406 06/09/25  0918 06/09/25  0414 06/08/25  0530 06/07/25  1053   POCT GLUCOSE mg/dL  --  74  --   --   --    GLUCOSE mg/dL 86  --  69* 94 110*     No results found for: \"HGBA1C\"     Assessment/Plan    Mechanical fall  Left humeral fracture, noted on 6/6  Generalized weakness  - Patient presented to ED for left humerus fracture on 6/6 and discharged home with follow-up with orthopedics  - Imaging unremarkable with exception of age-indeterminate compression fracture of L3-L5  - Consult orthopedics, no interventions, recommend immobilizer and weightbearing as tolerated, no ROM to shoulder  - PT/OT eval and treat  - Optimize pain control, do not give any more narcotics    Acute cystitis without hematuria  - UA with leukocytes and nitrites  - Urine culture with growth of Klebsiella  - Per susceptibilities, resistance can develop in 2nd and 3rd generation cephalosporins  - Will DC Rocephin and start IV cipro BID    Chronic anemia  GI bleed?  -  notes that patient has been having black tarry stools prior to presentation  - Hemoglobin 5.4 in the ER, received 2 units PRBCs and improved to 9.2/9.1/8.4/8.1  - Monitor H&H, transfuse for hemoglobin <7  - Monitor for overt bleeding  - Consider GI consult, patient's family notes that she is intermittently against any type of scoping  - PPI daily  - Avoid NSAIDs    CKD stage IV  - Baseline creatinine ~1.6 but progressively worsening  - Creatinine currently 2.73, slowly increasing  - Avoid nephrotoxic agents  - Give IV fluids given n.p.o. " status    Severe malnutrition secondary to chronic disease  - Nutrition consult  - Consult palliative med  - SLP eval and treat, keep n.p.o. until more alert    COPD, not in acute exacerbation  A-fib not on anticoagulation  HTN/HLD/CAD  - Continue home meds as appropriate  - Telemetry monitoring    Pain medication induced encephalopathy  - Patient was alert and talking during initial admission, became nonverbal and encephalopathic the next day  - Patient was receiving morphine around-the-clock, will discontinue  - After discussion with attending physician, defer pain control to see if mental status returns to baseline after holding narcotics  - Unable to give Toradol due to worsening kidney function and possible GI bleed    CODE STATUS has been changed to DNR and DNI with no ICU treatment    DVTp: Hold for anemia  Plan: Anticipate SNF    Jerica Pino PA-C    Plan of care was discussed extensively with patient.  Patient verbalized understanding through teach back method.  All question and concerns addressed upon examination.    Of note, this documentation is completed using the Dragon Dictation system (voice recognition software). There may be spelling and/or grammatical errors that were not corrected prior to final submission.             [1] atorvastatin, 20 mg, oral, Nightly  cefTRIAXone, 1 g, intravenous, q24h  donepezil, 10 mg, oral, Nightly  fluticasone furoate-vilanteroL, 1 puff, inhalation, Daily  lidocaine, 1 patch, transdermal, Daily  montelukast, 10 mg, oral, Daily  polyethylene glycol, 17 g, oral, Daily  [Held by provider] valsartan 80 mg, hydroCHLOROthiazide 12.5 mg for Diovan HCT, , oral, Daily     [2]    [3] PRN medications: acetaminophen **OR** acetaminophen **OR** acetaminophen, ondansetron **OR** ondansetron, oxyCODONE

## 2025-06-10 NOTE — PROGRESS NOTES
Inpatient Speech-Language Pathology Clinical Swallow Evaluation       Patient Name: Bora Moreno  MRN: [id number]  : 1935  Patient Room: [address] [place] [address]  Today's Date: 06/10/25  Time Calculation  Start Time: 1005  Stop Time: 1020  Time Calculation (min): 15 min     Current Problem:   1. Frequent falls        2. Anemia, unspecified type        3. Gastrointestinal hemorrhage, unspecified gastrointestinal hemorrhage type        4. Acute on chronic renal insufficiency            Recommendations:  Solid Diet Recommendations : NPO   Liquid Diet Recommendations: Ice chips after oral care   Compensatory Swallowing Strategies: Upright 90 degrees as possible for all oral intake, One to one assist with meals     Medication Administration Recommendations: Non Oral    Medical Staff Made Aware: Yes  Functional Oral Intake Scale: Level 1        nothing by mouth    Assessment:  Consistencies Trialed: Consistencies Trialed: Thin (IDDSI Level 0) - Spoon, Ice Chips       Oral Mechanism Exam   Trigeminal Nerve (V)   Jaw open/close: Reduced     Bite:  Absent     Sensation of face:  Reduced    Sensation of tongue: Reduced    Facial Nerve (VII)    Symmetry at rest: Reduced    Eyebrow raise: Absent     Lip pucker: Adequate    Lip retraction/smile: Absent       Cheek puffed: Absent     Taste: Absent     Vagus (X)  Volitional cough: Absent     Hypoglossal (XII)       Lingual protrusion: Absent     Tongue ROM/laterization, elevation: Absent     Tongue strength: Absent     Dentition: Partially missing dentition  Respiratory Status: Oxygen via nasal cannula   Behavior/Cognition: Lethargic, Requires cueing, Doesn't follow directions    Pt laying in bed with ice pack on L shoulder and  and daughter at bedside. Pt aroused when clinician called her name and touched her hand. Overall alertness was low but pt responded to verbal and tactile stimuli. Pt was sat upright in bed for P.O. intake. Oral hygiene appearing poor. Dried secretions  noted on lips, tongue, and teeth. With max prompts pt was able to accept several ice chips, manipulate the ice chips in her mouth, and swallow. Pt was given a spoonful of thin liquids which she sipped at first and swallowed with no overt s/s aspiration and/or penetration observed. Pt was given a second spoonful of thin liquids which resulted in a delayed throat clear and weak cough response. Concern for aspiration with thin liquids due to decreased alertness. After several trial of ice chips pt was opening eyes and opening her mouth to accept more. Family was educated at bedside on how to administer ice chips safely while pt is upright and awake. NO P.O. intake should be given to pt unless she is upright and awake. Recommending NPO - ice chips at this time. Will continue to assess and trial diet upgrades as levels of alertness and participation increase.     Plan:  SLP Plan: Skilled SLP Skilled speech therapy for dysphagia treatment is warranted in order to provide training and instruction regarding the use of compensatory swallow strategies, assess tolerance of diet and pt/caregiver education in order to reduce risk of aspiration, dehydration and malnutrition.  SLP Frequency: 3x per week   Duration: 30 days   Next Treatment Priority: P.O. trials   Discussed POC: Patient, Nursing, Caregiver/family   Discussed Risks/Benefits: Yes, Caregiver/Family, Nursing   Patient/Caregiver Agreeable: Yes   Prognosis: Guarded  Barriers to improvement: comorbidities  SLP Discharge Recommendations: Unable to determine at this time, refer to subsequent notes    Goals:  Goals established on 06/10/25   Duration: 30 days  - Patient will tolerate therapeutic trials without overt s/s of aspiration in 90 % of PO trials with max cueing.    Subjective   Patient Positioning: Upright in Bed    Pain:  Ratin-10   Pt report: 0  Action taken: none needed    General Visit Information:  Pt. Admitted on , for: weakness after fall. Pt continuing  to decline. Not eating not due to increased lethargy.   Past medical history: CAD, HTN, HLD, PVD, mitral regurg, OA, COPD, CKD stage IV, A-fib, age indeterminant L3-L5 compression fracture.   Living Environment: Home           Reason for Referral: not eating/increased lethargy      Current Diet : reg/thin   Prior to Session Communication: Bedside nurse     Treatment Completed with evaluation:   No      SLP Inpatient Education:  Learner family, patient   Barriers to Learning Acuteness of the illness, Physical limitations   Method Verbal, Demonstration   Education - Topic ST provided patient education regarding role of ST, purpose of assessment, clinical impressions, goals of care     Outcome    0= unable to meet

## 2025-06-10 NOTE — PROGRESS NOTES
"Bora Moreno is a 89 y.o. female on day 3 of admission presenting with Frequent falls.    Subjective   Sitting up in a chair.  Nonverbal    Objective     Physical Exam  GEN: awake and sitting up in a chair.  Remains nonverbal.  Family does not feel she recognizes them.    RESP: even and regular    Last Recorded Vitals  Blood pressure 152/67, pulse 95, temperature 37.3 °C (99.1 °F), temperature source Temporal, resp. rate 18, height 1.702 m (5' 7\"), weight (!) 44.4 kg (97 lb 14.2 oz), SpO2 100%.  Intake/Output last 3 Shifts:  I/O last 3 completed shifts:  In: 2016.8 (45.4 mL/kg) [P.O.:200; I.V.:1766.8 (39.8 mL/kg); IV Piggyback:50]  Out: 500 (11.3 mL/kg) [Urine:500 (0.3 mL/kg/hr)]  Weight: 44.4 kg     Relevant Results  Scheduled medications  Scheduled Medications[1]  Continuous medications  Continuous Medications[2]  PRN medications  PRN Medications[3]    Results for orders placed or performed during the hospital encounter of 06/07/25 (from the past 24 hours)   Basic Metabolic Panel   Result Value Ref Range    Glucose 86 74 - 99 mg/dL    Sodium 145 136 - 145 mmol/L    Potassium 5.2 3.5 - 5.3 mmol/L    Chloride 118 (H) 98 - 107 mmol/L    Bicarbonate 15 (L) 21 - 32 mmol/L    Anion Gap 17 10 - 20 mmol/L    Urea Nitrogen 55 (H) 6 - 23 mg/dL    Creatinine 2.73 (H) 0.50 - 1.05 mg/dL    eGFR 16 (L) >60 mL/min/1.73m*2    Calcium 8.6 8.6 - 10.3 mg/dL   CBC   Result Value Ref Range    WBC 9.7 4.4 - 11.3 x10*3/uL    nRBC 0.0 0.0 - 0.0 /100 WBCs    RBC 2.49 (L) 4.00 - 5.20 x10*6/uL    Hemoglobin 8.1 (L) 12.0 - 16.0 g/dL    Hematocrit 26.2 (L) 36.0 - 46.0 %     (H) 80 - 100 fL    MCH 32.5 26.0 - 34.0 pg    MCHC 30.9 (L) 32.0 - 36.0 g/dL    RDW 20.5 (H) 11.5 - 14.5 %    Platelets 138 (L) 150 - 450 x10*3/uL   Magnesium   Result Value Ref Range    Magnesium 1.88 1.60 - 2.40 mg/dL         Assessment/Plan   -Met with pt's family at bedside.  They are in agreement to code status change of DNR/DNI and no ICU level of care.  " Remain hopeful she can go to rehab and improve but understand if her intake does improve may need to consider hospice options.    I spent 45 minutes in the professional and overall care of this patient.      Ruth Ann Thao, APRN-CNP           [1] atorvastatin, 20 mg, oral, Nightly  cefTRIAXone, 1 g, intravenous, q24h  donepezil, 10 mg, oral, Nightly  fluticasone furoate-vilanteroL, 1 puff, inhalation, Daily  lidocaine, 1 patch, transdermal, Daily  montelukast, 10 mg, oral, Daily  polyethylene glycol, 17 g, oral, Daily  [Held by provider] valsartan 80 mg, hydroCHLOROthiazide 12.5 mg for Diovan HCT, , oral, Daily  [2]    [3] PRN medications: acetaminophen **OR** acetaminophen **OR** acetaminophen, ondansetron **OR** ondansetron, oxyCODONE

## 2025-06-10 NOTE — DOCUMENTATION CLARIFICATION NOTE
"    PATIENT:               ELÍAS GHOSH  ACCT #:                  [id number]  MRN:                       [id number]  :                       1935  ADMIT DATE:       2025 10:37 AM  DISCH DATE:  RESPONDING PROVIDER #:        [id number]          PROVIDER RESPONSE TEXT:    Metabolic encephalopathy 2/2 cystitis super imposed on dementia    CDI QUERY TEXT:    Clarification      Instruction:    Based on your assessment of the patient and the clinical information, please provide the requested documentation by clicking on the appropriate radio button and enter any additional information if prompted.    Question: Can a diagnosis be given for the above clinical information    When answering this query, please exercise your independent professional judgment. The fact that a question is being asked, does not imply that any particular answer is desired or expected.    The patient's clinical indicators include:  Clinical Information: 89 year old presents with weakness after a recent ER visit from fall.    Clinical Indicators:   ED \"She is alert and oriented to person, place, and time. Mental status is at baseline.\"  \"Patient ANO x 2 and unsure if this is patient's baseline\"     ED procedure note \"following conditions: severe anemia,\"     Internal medicine \"Cystitis without hematuria\" \"Will start on Rocephin\"  \"Anemia\" \"GI Bleed\"     Consult GI \"As patient is lethargic after receiving Zyprexa\"     Consult  Palliative Care  \"Discussed risks of CPR in someone with advanced dementia \"     PN  Internal Medicine \"Patient lethargic and declining.\"    Treatment: Monitoring labs  ceftriaxone (Rocephin) 1 g in dextrose (iso) IV 50 mL    Risk Factors: Age, Cystitis, anemia, dementia  Options provided:  -- Metabolic encephalopathy 2/2 cystitis super imposed on dementia  -- Metabolic encephalopathy multifactorial 2/2 cystitis and anemia  super imposed on dementia  -- Worsening dementia  -- Other - I will add my own " diagnosis  -- Refer to Clinical Documentation Reviewer    Query created by: Lissy Feldman on 6/10/2025 11:29 AM      Electronically signed by:  JOSE JUAN PORTER 6/10/2025 11:35 AM

## 2025-06-11 LAB
ANION GAP SERPL CALC-SCNC: 15 MMOL/L (ref 10–20)
BUN SERPL-MCNC: 53 MG/DL (ref 6–23)
CALCIUM SERPL-MCNC: 8.3 MG/DL (ref 8.6–10.3)
CHLORIDE SERPL-SCNC: 120 MMOL/L (ref 98–107)
CO2 SERPL-SCNC: 16 MMOL/L (ref 21–32)
CREAT SERPL-MCNC: 2.36 MG/DL (ref 0.5–1.05)
EGFRCR SERPLBLD CKD-EPI 2021: 19 ML/MIN/1.73M*2
ERYTHROCYTE [DISTWIDTH] IN BLOOD BY AUTOMATED COUNT: 19.9 % (ref 11.5–14.5)
GLUCOSE SERPL-MCNC: 111 MG/DL (ref 74–99)
HCT VFR BLD AUTO: 24.4 % (ref 36–46)
HGB BLD-MCNC: 7.5 G/DL (ref 12–16)
HOLD SPECIMEN: NORMAL
MAGNESIUM SERPL-MCNC: 1.76 MG/DL (ref 1.6–2.4)
MCH RBC QN AUTO: 32.5 PG (ref 26–34)
MCHC RBC AUTO-ENTMCNC: 30.7 G/DL (ref 32–36)
MCV RBC AUTO: 106 FL (ref 80–100)
NRBC BLD-RTO: 0 /100 WBCS (ref 0–0)
PLATELET # BLD AUTO: 129 X10*3/UL (ref 150–450)
POTASSIUM SERPL-SCNC: 4.8 MMOL/L (ref 3.5–5.3)
RBC # BLD AUTO: 2.31 X10*6/UL (ref 4–5.2)
SODIUM SERPL-SCNC: 146 MMOL/L (ref 136–145)
WBC # BLD AUTO: 6.8 X10*3/UL (ref 4.4–11.3)

## 2025-06-11 PROCEDURE — 92526 ORAL FUNCTION THERAPY: CPT | Mod: GN

## 2025-06-11 PROCEDURE — 2500000004 HC RX 250 GENERAL PHARMACY W/ HCPCS (ALT 636 FOR OP/ED): Performed by: NURSE PRACTITIONER

## 2025-06-11 PROCEDURE — 2500000004 HC RX 250 GENERAL PHARMACY W/ HCPCS (ALT 636 FOR OP/ED)

## 2025-06-11 PROCEDURE — 2500000005 HC RX 250 GENERAL PHARMACY W/O HCPCS: Performed by: NURSE PRACTITIONER

## 2025-06-11 PROCEDURE — 85027 COMPLETE CBC AUTOMATED: CPT

## 2025-06-11 PROCEDURE — 36415 COLL VENOUS BLD VENIPUNCTURE: CPT

## 2025-06-11 PROCEDURE — 99232 SBSQ HOSP IP/OBS MODERATE 35: CPT

## 2025-06-11 PROCEDURE — 1210000001 HC SEMI-PRIVATE ROOM DAILY

## 2025-06-11 PROCEDURE — 83735 ASSAY OF MAGNESIUM: CPT

## 2025-06-11 PROCEDURE — 99232 SBSQ HOSP IP/OBS MODERATE 35: CPT | Performed by: NURSE PRACTITIONER

## 2025-06-11 PROCEDURE — 80048 BASIC METABOLIC PNL TOTAL CA: CPT

## 2025-06-11 RX ORDER — DEXTROSE MONOHYDRATE AND SODIUM CHLORIDE 5; .9 G/100ML; G/100ML
75 INJECTION, SOLUTION INTRAVENOUS CONTINUOUS
Status: ACTIVE | OUTPATIENT
Start: 2025-06-11 | End: 2025-06-12

## 2025-06-11 RX ORDER — CIPROFLOXACIN 2 MG/ML
200 INJECTION, SOLUTION INTRAVENOUS EVERY 24 HOURS
Status: DISCONTINUED | OUTPATIENT
Start: 2025-06-12 | End: 2025-06-13 | Stop reason: HOSPADM

## 2025-06-11 RX ORDER — SODIUM CHLORIDE 9 MG/ML
75 INJECTION, SOLUTION INTRAVENOUS CONTINUOUS
Status: DISCONTINUED | OUTPATIENT
Start: 2025-06-11 | End: 2025-06-11

## 2025-06-11 RX ADMIN — CIPROFLOXACIN 400 MG: 400 INJECTION, SOLUTION INTRAVENOUS at 03:13

## 2025-06-11 RX ADMIN — LIDOCAINE 1 PATCH: 4 PATCH TOPICAL at 10:05

## 2025-06-11 RX ADMIN — SODIUM CHLORIDE 75 ML/HR: 0.9 INJECTION, SOLUTION INTRAVENOUS at 08:35

## 2025-06-11 RX ADMIN — SODIUM CHLORIDE 75 ML/HR: 0.9 INJECTION, SOLUTION INTRAVENOUS at 16:02

## 2025-06-11 RX ADMIN — DEXTROSE AND SODIUM CHLORIDE 75 ML/HR: 5; .9 INJECTION, SOLUTION INTRAVENOUS at 22:57

## 2025-06-11 RX ADMIN — PANTOPRAZOLE SODIUM 40 MG: 40 INJECTION, POWDER, LYOPHILIZED, FOR SOLUTION INTRAVENOUS at 05:51

## 2025-06-11 ASSESSMENT — COGNITIVE AND FUNCTIONAL STATUS - GENERAL
CLIMB 3 TO 5 STEPS WITH RAILING: TOTAL
TOILETING: TOTAL
CLIMB 3 TO 5 STEPS WITH RAILING: TOTAL
DRESSING REGULAR UPPER BODY CLOTHING: TOTAL
DAILY ACTIVITIY SCORE: 6
MOVING FROM LYING ON BACK TO SITTING ON SIDE OF FLAT BED WITH BEDRAILS: A LOT
TOILETING: TOTAL
MOBILITY SCORE: 8
WALKING IN HOSPITAL ROOM: TOTAL
PERSONAL GROOMING: TOTAL
MOBILITY SCORE: 8
MOVING TO AND FROM BED TO CHAIR: TOTAL
WALKING IN HOSPITAL ROOM: TOTAL
TURNING FROM BACK TO SIDE WHILE IN FLAT BAD: A LOT
EATING MEALS: TOTAL
DRESSING REGULAR LOWER BODY CLOTHING: TOTAL
MOVING FROM LYING ON BACK TO SITTING ON SIDE OF FLAT BED WITH BEDRAILS: A LOT
PERSONAL GROOMING: TOTAL
DRESSING REGULAR LOWER BODY CLOTHING: TOTAL
DRESSING REGULAR UPPER BODY CLOTHING: TOTAL
MOVING TO AND FROM BED TO CHAIR: TOTAL
DAILY ACTIVITIY SCORE: 6
TURNING FROM BACK TO SIDE WHILE IN FLAT BAD: A LOT
STANDING UP FROM CHAIR USING ARMS: TOTAL
EATING MEALS: TOTAL
STANDING UP FROM CHAIR USING ARMS: TOTAL
HELP NEEDED FOR BATHING: TOTAL
HELP NEEDED FOR BATHING: TOTAL

## 2025-06-11 ASSESSMENT — PAIN SCALES - PAIN ASSESSMENT IN ADVANCED DEMENTIA (PAINAD)
TOTALSCORE: 1
CONSOLABILITY: NO NEED TO CONSOLE
BODYLANGUAGE: TENSE, DISTRESSED PACING, FIDGETING
FACIALEXPRESSION: SMILING OR INEXPRESSIVE
BREATHING: NORMAL

## 2025-06-11 NOTE — PROGRESS NOTES
"Bora Moreno is a 89 y.o. female on day 4 of admission presenting with Frequent falls.    Subjective   Pt awake, not interacting    Objective     Physical Exam  GEN: awake and alert.  No conversing with spouse  RESP: even and regular    Last Recorded Vitals  Blood pressure 127/61, pulse 87, temperature 37 °C (98.6 °F), resp. rate 16, height 1.702 m (5' 7\"), weight (!) 44.4 kg (97 lb 14.2 oz), SpO2 99%.  Intake/Output last 3 Shifts:  I/O last 3 completed shifts:  In: 1393.8 (31.4 mL/kg) [I.V.:1193.8 (26.9 mL/kg); IV Piggyback:200]  Out: 300 (6.8 mL/kg) [Urine:300 (0.2 mL/kg/hr)]  Weight: 44.4 kg     Relevant Results  Scheduled medications  Scheduled Medications[1]  Continuous medications  Continuous Medications[2]  PRN medications  PRN Medications[3]    Results for orders placed or performed during the hospital encounter of 06/07/25 (from the past 24 hours)   Basic Metabolic Panel   Result Value Ref Range    Glucose 111 (H) 74 - 99 mg/dL    Sodium 146 (H) 136 - 145 mmol/L    Potassium 4.8 3.5 - 5.3 mmol/L    Chloride 120 (H) 98 - 107 mmol/L    Bicarbonate 16 (L) 21 - 32 mmol/L    Anion Gap 15 10 - 20 mmol/L    Urea Nitrogen 53 (H) 6 - 23 mg/dL    Creatinine 2.36 (H) 0.50 - 1.05 mg/dL    eGFR 19 (L) >60 mL/min/1.73m*2    Calcium 8.3 (L) 8.6 - 10.3 mg/dL   CBC   Result Value Ref Range    WBC 6.8 4.4 - 11.3 x10*3/uL    nRBC 0.0 0.0 - 0.0 /100 WBCs    RBC 2.31 (L) 4.00 - 5.20 x10*6/uL    Hemoglobin 7.5 (L) 12.0 - 16.0 g/dL    Hematocrit 24.4 (L) 36.0 - 46.0 %     (H) 80 - 100 fL    MCH 32.5 26.0 - 34.0 pg    MCHC 30.7 (L) 32.0 - 36.0 g/dL    RDW 19.9 (H) 11.5 - 14.5 %    Platelets 129 (L) 150 - 450 x10*3/uL   Magnesium   Result Value Ref Range    Magnesium 1.76 1.60 - 2.40 mg/dL   SST TOP   Result Value Ref Range    Extra Tube Hold for add-ons.        Assessment/Plan   -Met with spouse early am.  Did not eat any dinner that he is aware of.  Will attempt to get breakfast in her.  SNF precert pending.        I " spent 25minutes in the professional and overall care of this patient.      Ruth Ann Thao, APRN-CNP           [1] atorvastatin, 20 mg, oral, Nightly  ciprofloxacin, 400 mg, intravenous, q12h  donepezil, 10 mg, oral, Nightly  fluticasone furoate-vilanteroL, 1 puff, inhalation, Daily  lidocaine, 1 patch, transdermal, Daily  montelukast, 10 mg, oral, Daily  pantoprazole, 40 mg, oral, Daily before breakfast   Or  pantoprazole, 40 mg, intravenous, Daily before breakfast  polyethylene glycol, 17 g, oral, Daily  [Held by provider] valsartan 80 mg, hydroCHLOROthiazide 12.5 mg for Diovan HCT, , oral, Daily  [2] sodium chloride 0.9%, 75 mL/hr, Last Rate: 75 mL/hr (06/11/25 0835)  [3] PRN medications: acetaminophen **OR** acetaminophen **OR** acetaminophen, OLANZapine, ondansetron **OR** ondansetron, oxyCODONE

## 2025-06-11 NOTE — CARE PLAN
The patient's goals for the shift include Labs WNL    The clinical goals for the shift include Patient will remain safe and free from injury t/o shift    Over the shift, the patient did not make progress toward the following goals.       Problem: Pain - Adult  Goal: Verbalizes/displays adequate comfort level or baseline comfort level  Outcome: Progressing     Problem: Safety - Adult  Goal: Free from fall injury  Outcome: Progressing     Problem: Discharge Planning  Goal: Discharge to home or other facility with appropriate resources  Outcome: Progressing     Problem: Chronic Conditions and Co-morbidities  Goal: Patient's chronic conditions and co-morbidity symptoms are monitored and maintained or improved  Outcome: Progressing     Problem: Nutrition  Goal: Nutrient intake appropriate for maintaining nutritional needs  Outcome: Progressing     Problem: Skin  Goal: Decreased wound size/increased tissue granulation at next dressing change  Outcome: Progressing  Goal: Participates in plan/prevention/treatment measures  Outcome: Progressing  Goal: Prevent/manage excess moisture  Outcome: Progressing  Flowsheets (Taken 6/11/2025 1131)  Prevent/manage excess moisture: Monitor for/manage infection if present  Goal: Prevent/minimize sheer/friction injuries  Outcome: Progressing  Goal: Promote/optimize nutrition  Outcome: Progressing  Goal: Promote skin healing  Outcome: Progressing     Problem: Fall/Injury  Goal: Not fall by end of shift  Outcome: Progressing  Goal: Be free from injury by end of the shift  Outcome: Progressing  Goal: Verbalize understanding of personal risk factors for fall in the hospital  Outcome: Progressing  Goal: Verbalize understanding of risk factor reduction measures to prevent injury from fall in the home  Outcome: Progressing  Goal: Use assistive devices by end of the shift  Outcome: Progressing  Goal: Pace activities to prevent fatigue by end of the shift  Outcome: Progressing     Problem:  Pain  Goal: Takes deep breaths with improved pain control throughout the shift  Outcome: Progressing  Goal: Turns in bed with improved pain control throughout the shift  Outcome: Progressing  Goal: Walks with improved pain control throughout the shift  Outcome: Progressing  Goal: Performs ADL's with improved pain control throughout shift  Outcome: Progressing  Goal: Participates in PT with improved pain control throughout the shift  Outcome: Progressing  Goal: Free from opioid side effects throughout the shift  Outcome: Progressing  Goal: Free from acute confusion related to pain meds throughout the shift  Outcome: Progressing

## 2025-06-11 NOTE — PROGRESS NOTES
Daily Progress Note    Bora Moreno is a 89 y.o. female on day 4 of admission presenting with Frequent falls.    Subjective   Patient seen and examined, resting quietly in bed.  Daughter and  at bedside.  Patient appears more awake/alert today, was able to answer a few questions.  Family believes that she is more interactive today.  Unable to endorse pain, but winces to with palpation to left shoulder and LUE.  SLP reevaluated today, will remain n.p.o. for now.  Per [facility], patient has a bed available at the [facility].  Hemoglobin continues to drop, GI will not be reinvolved as patient does not want to undergo any type of scoping or interventions.  Will anticipate discharge within the next 24 to 48 hours.       Objective   Physical Exam  Constitutional:       Appearance: She is cachectic. She is ill-appearing.   HENT:      Head: Normocephalic.      Mouth/Throat:      Mouth: Mucous membranes are dry.   Eyes:      Pupils: Pupils are equal, round, and reactive to light.   Cardiovascular:      Rate and Rhythm: Normal rate and regular rhythm.      Heart sounds: Normal heart sounds, S1 normal and S2 normal.   Pulmonary:      Effort: Pulmonary effort is normal.      Breath sounds: Normal breath sounds.   Abdominal:      General: Bowel sounds are normal.      Palpations: Abdomen is soft.      Tenderness: There is no abdominal tenderness.   Musculoskeletal:      Cervical back: Neck supple.      Right lower leg: No edema.      Left lower leg: No edema.      Comments: LUE in sling, able to wiggle fingers but causes pain   Skin:     General: Skin is warm.      Findings: Bruising present.   Neurological:      Mental Status: She is alert. She is disoriented.      Motor: Weakness present.   Psychiatric:         Behavior: Behavior is withdrawn.         Cognition and Memory: Cognition is impaired. Memory is impaired.      Comments: Answered very few questions         Last Recorded Vitals  Blood pressure 140/63, pulse 83,  "temperature 37.1 °C (98.8 °F), resp. rate 20, height 1.702 m (5' 7\"), weight (!) 44.4 kg (97 lb 14.2 oz), SpO2 98%.  Intake/Output last 3 Shifts:  I/O last 3 completed shifts:  In: 1393.8 (31.4 mL/kg) [I.V.:1193.8 (26.9 mL/kg); IV Piggyback:200]  Out: 300 (6.8 mL/kg) [Urine:300 (0.2 mL/kg/hr)]  Weight: 44.4 kg     Medications  Scheduled medications  Scheduled Medications[1]  Continuous medications  Continuous Medications[2]  PRN medications  PRN Medications[3]    Labs  CBC:   Results from last 7 days   Lab Units 06/11/25  0407 06/10/25  0406 06/09/25  0414   WBC AUTO x10*3/uL 6.8 9.7 8.5   RBC AUTO x10*6/uL 2.31* 2.49* 2.58*   HEMOGLOBIN g/dL 7.5* 8.1* 8.4*   HEMATOCRIT % 24.4* 26.2* 26.3*   MCV fL 106* 105* 102*   MCH pg 32.5 32.5 32.6   MCHC g/dL 30.7* 30.9* 31.9*   RDW % 19.9* 20.5* 21.2*   PLATELETS AUTO x10*3/uL 129* 138* 168     CMP:    Results from last 7 days   Lab Units 06/11/25  0407 06/10/25  0406 06/09/25  0414 06/08/25  0530 06/07/25  1053   SODIUM mmol/L 146* 145 142   < > 138   POTASSIUM mmol/L 4.8 5.2 5.2   < > 5.2   CHLORIDE mmol/L 120* 118* 114*   < > 108*   CO2 mmol/L 16* 15* 14*   < > 18*   BUN mg/dL 53* 55* 47*   < > 53*   CREATININE mg/dL 2.36* 2.73* 2.47*   < > 2.57*   GLUCOSE mg/dL 111* 86 69*   < > 110*   PROTEIN TOTAL g/dL  --   --   --   --  8.8*   CALCIUM mg/dL 8.3* 8.6 8.4*   < > 8.4*   BILIRUBIN TOTAL mg/dL  --   --   --   --  0.3   ALK PHOS U/L  --   --   --   --  85   AST U/L  --   --   --   --  10   ALT U/L  --   --   --   --  8    < > = values in this interval not displayed.     BMP:    Results from last 7 days   Lab Units 06/11/25  0407 06/10/25  0406 06/09/25  0414   SODIUM mmol/L 146* 145 142   POTASSIUM mmol/L 4.8 5.2 5.2   CHLORIDE mmol/L 120* 118* 114*   CO2 mmol/L 16* 15* 14*   BUN mg/dL 53* 55* 47*   CREATININE mg/dL 2.36* 2.73* 2.47*   CALCIUM mg/dL 8.3* 8.6 8.4*   GLUCOSE mg/dL 111* 86 69*     Magnesium:  Results from last 7 days   Lab Units 06/11/25  0407 06/10/25  0406 " "06/09/25  0414   MAGNESIUM mg/dL 1.76 1.88 1.65     Troponin:    Results from last 7 days   Lab Units 06/07/25  1153 06/07/25  1053   TROPHS ng/L 23* 26*     BNP:   Results from last 7 days   Lab Units 06/07/25  1053   BNP pg/mL 275*     Lipid Panel:  Results from last 7 days   Lab Units 06/07/25  1053   INR  1.3*   PROTIME seconds 14.0*        Nutrition             Relevant Results  Results from last 7 days   Lab Units 06/11/25  0407 06/10/25  0406 06/09/25  0918 06/09/25  0414 06/08/25  0530 06/07/25  1053   POCT GLUCOSE mg/dL  --   --  74  --   --   --    GLUCOSE mg/dL 111* 86  --  69* 94 110*     No results found for: \"HGBA1C\"     Assessment/Plan    Mechanical fall  Left humeral fracture, noted on 6/6  Generalized weakness  - Patient presented to ED for left humerus fracture on 6/6 and discharged home with follow-up with orthopedics  - Imaging unremarkable with exception of age-indeterminate compression fracture of L3-L5  - Consult orthopedics, no interventions, recommend immobilizer and weightbearing as tolerated, no ROM to shoulder  - PT/OT eval and treat  - Optimize pain control, do not give any more narcotics     Acute cystitis without hematuria  - UA with leukocytes and nitrites  - Urine culture with growth of Klebsiella  - Continue IV cipro BID     Chronic anemia  GI bleed?  -  notes that patient has been having black tarry stools prior to presentation  - Hemoglobin 5.4 in the ER, received 2 units PRBCs and trending 9.2/9.1/8.4/8.1/7.5  - Monitor H&H, transfuse for hemoglobin <7  - Monitor for overt bleeding  - GI consulted on initial admission, patient adamantly refusing any type of intervention  - PPI daily  - Avoid NSAIDs     CKD stage IV  - Baseline creatinine ~1.6 but progressively worsening  - Creatinine downtrending today after resuming IV fluids  - Avoid nephrotoxic agents  - Give IV fluids given n.p.o. status     Severe malnutrition secondary to chronic disease  - Nutrition consult  - " Consult palliative med  - SLP eval and treat, keep n.p.o. for now     COPD, not in acute exacerbation  A-fib not on anticoagulation  HTN/HLD/CAD  Dementia  - Continue home meds as appropriate  - Zyprexa for agitation control as needed     Pain medication induced encephalopathy  - Patient was alert and talking during initial admission, became nonverbal and encephalopathic the next day  - Patient was receiving morphine around-the-clock, will discontinue  - After discussion with attending physician, defer pain control to see if mental status returns to baseline after holding narcotics  - Unable to give Toradol due to worsening kidney function and possible GI bleed  - Mental status slowly improving after discontinuing narcotics     CODE STATUS DNR and DNI with no ICU treatment     DVTp: Hold for anemia  Plan: [facility] once medically ready, anticipate discharge within 24 to 48 hours    Jerica Pino PA-C    Plan of care was discussed extensively with patient.  Patient verbalized understanding through teach back method.  All question and concerns addressed upon examination.    Of note, this documentation is completed using the Dragon Dictation system (voice recognition software). There may be spelling and/or grammatical errors that were not corrected prior to final submission.             [1] atorvastatin, 20 mg, oral, Nightly  [START ON 6/12/2025] ciprofloxacin, 200 mg, intravenous, q24h  donepezil, 10 mg, oral, Nightly  fluticasone furoate-vilanteroL, 1 puff, inhalation, Daily  lidocaine, 1 patch, transdermal, Daily  montelukast, 10 mg, oral, Daily  pantoprazole, 40 mg, oral, Daily before breakfast   Or  pantoprazole, 40 mg, intravenous, Daily before breakfast  polyethylene glycol, 17 g, oral, Daily  [Held by provider] valsartan 80 mg, hydroCHLOROthiazide 12.5 mg for Diovan HCT, , oral, Daily  [2] sodium chloride 0.9%, 75 mL/hr, Last Rate: 75 mL/hr (06/11/25 0860)  [3] PRN medications: acetaminophen **OR**  acetaminophen **OR** acetaminophen, OLANZapine, ondansetron **OR** ondansetron, oxyCODONE

## 2025-06-11 NOTE — PROGRESS NOTES
Inpatient Speech Language Pathology Treatment Note     Patient Name: Bora Moreno  MRN: [id number]  : 1935  Patient Room: [address] [place] [address]  Today's Date: 25  Time Calculation  Start Time: 1300  Stop Time: 1314  Time Calculation (min): 14 min         PLAN:  Skilled speech therapy for dysphagia treatment continues to be warranted for pt/caregiver education in order to reduce risk of aspiration, dehydration and malnutrition. , to determine ability to upgrade diet after PO trials with SLP  SLP TX Plan: Continue Plan of Care  SLP Frequency: 3x per week  Duration: 30 days  Discussed POC: Patient  Discussed Risks/Benefits: Patient  Patient/Caregiver Agreeable: Yes  Continue skilled SLP at next level of care: unable to determine at this time     Recommendations:   Solid Diet Recommendations: NPO  Liquid Diet Recommendations: NPO  Medication administration: Non-oral     SLP Assessment:  Attempted oral care prior to trials, pt resistive, attempting to push SLP away and close mouth. Pt attempting to talk but unintelligible speech.   Attempted ice chip trials x2. Pt with poor/minimal manipulation of ice chip trial. Inconsistent Audible gulping present post Ice chip trial. Pt then demonstrated weak throat clearing and cough. Multiple swallow attempts continued to be completed post trial. Pt not appropriate for further trials, aspiration suspected. Recommend pt continue NPO. Per palliative care note, family may consider hospice care if no improvements made.   Recommend Frequent oral care as tolerated to improve infection control, as well as to reduce dental plaque and bacteria on oropharyngeal surfaces which may increase the risk nosocomial infections, including pneumonia.       Subjective:  Pt. Seen at bedside for skilled dysphagia treatment.   Prior to Session Communication: Bedside nurse  Pain:  Ratin-10   Pt report: 0   Action taken: none needed         Oxygen Status:   nasal cannula             Goals:   Goals  established on 06/10/25   Duration: 30 days  - Patient will tolerate therapeutic trials without overt s/s of aspiration in 90 % of PO trials with max cueing.         Treatment Outcome:  SLP TX Intervention Outcome: No Progress Made    Treatment Tolerance: Patient limited by fatigue   Prognosis: Guarded   Barriers: Comorbidities   Medical Staff Made Aware: Yes         SLP Inpatient Education:  Learner family, patient   Barriers to Learning None - family    Method Verbal   Education - Topic ST provided patient education regarding role of ST, purpose of treatment, clinical impressions, recommended diet     Outcome    1= partially meets; needs review

## 2025-06-12 LAB
ANION GAP SERPL CALC-SCNC: 16 MMOL/L (ref 10–20)
BUN SERPL-MCNC: 45 MG/DL (ref 6–23)
CALCIUM SERPL-MCNC: 8.7 MG/DL (ref 8.6–10.3)
CHLORIDE SERPL-SCNC: 124 MMOL/L (ref 98–107)
CO2 SERPL-SCNC: 15 MMOL/L (ref 21–32)
CREAT SERPL-MCNC: 2.15 MG/DL (ref 0.5–1.05)
EGFRCR SERPLBLD CKD-EPI 2021: 22 ML/MIN/1.73M*2
ERYTHROCYTE [DISTWIDTH] IN BLOOD BY AUTOMATED COUNT: 19.8 % (ref 11.5–14.5)
GLUCOSE SERPL-MCNC: 97 MG/DL (ref 74–99)
HCT VFR BLD AUTO: 23.4 % (ref 36–46)
HGB BLD-MCNC: 7.1 G/DL (ref 12–16)
MAGNESIUM SERPL-MCNC: 1.67 MG/DL (ref 1.6–2.4)
MCH RBC QN AUTO: 32.1 PG (ref 26–34)
MCHC RBC AUTO-ENTMCNC: 30.3 G/DL (ref 32–36)
MCV RBC AUTO: 106 FL (ref 80–100)
NRBC BLD-RTO: 0 /100 WBCS (ref 0–0)
PLATELET # BLD AUTO: 101 X10*3/UL (ref 150–450)
POTASSIUM SERPL-SCNC: 4.6 MMOL/L (ref 3.5–5.3)
RBC # BLD AUTO: 2.21 X10*6/UL (ref 4–5.2)
SODIUM SERPL-SCNC: 150 MMOL/L (ref 136–145)
WBC # BLD AUTO: 4.2 X10*3/UL (ref 4.4–11.3)

## 2025-06-12 PROCEDURE — 83735 ASSAY OF MAGNESIUM: CPT

## 2025-06-12 PROCEDURE — 2500000005 HC RX 250 GENERAL PHARMACY W/O HCPCS: Performed by: NURSE PRACTITIONER

## 2025-06-12 PROCEDURE — 2500000004 HC RX 250 GENERAL PHARMACY W/ HCPCS (ALT 636 FOR OP/ED)

## 2025-06-12 PROCEDURE — 80048 BASIC METABOLIC PNL TOTAL CA: CPT

## 2025-06-12 PROCEDURE — 97112 NEUROMUSCULAR REEDUCATION: CPT | Mod: GO,CO

## 2025-06-12 PROCEDURE — 99233 SBSQ HOSP IP/OBS HIGH 50: CPT | Performed by: NURSE PRACTITIONER

## 2025-06-12 PROCEDURE — 1210000001 HC SEMI-PRIVATE ROOM DAILY

## 2025-06-12 PROCEDURE — 92526 ORAL FUNCTION THERAPY: CPT | Mod: GN

## 2025-06-12 PROCEDURE — 36415 COLL VENOUS BLD VENIPUNCTURE: CPT

## 2025-06-12 PROCEDURE — 97530 THERAPEUTIC ACTIVITIES: CPT | Mod: GP,CQ

## 2025-06-12 PROCEDURE — 85027 COMPLETE CBC AUTOMATED: CPT

## 2025-06-12 PROCEDURE — 99232 SBSQ HOSP IP/OBS MODERATE 35: CPT

## 2025-06-12 RX ORDER — HYDROMORPHONE HYDROCHLORIDE 0.2 MG/ML
0.2 INJECTION INTRAMUSCULAR; INTRAVENOUS; SUBCUTANEOUS EVERY 4 HOURS PRN
Status: DISCONTINUED | OUTPATIENT
Start: 2025-06-12 | End: 2025-06-13 | Stop reason: HOSPADM

## 2025-06-12 RX ADMIN — PANTOPRAZOLE SODIUM 40 MG: 40 INJECTION, POWDER, LYOPHILIZED, FOR SOLUTION INTRAVENOUS at 06:23

## 2025-06-12 RX ADMIN — CIPROFLOXACIN 200 MG: 200 INJECTION, SOLUTION INTRAVENOUS at 09:52

## 2025-06-12 RX ADMIN — LIDOCAINE 1 PATCH: 4 PATCH TOPICAL at 09:52

## 2025-06-12 RX ADMIN — OLANZAPINE 2.5 MG: 10 INJECTION, POWDER, FOR SOLUTION INTRAMUSCULAR at 22:07

## 2025-06-12 ASSESSMENT — COGNITIVE AND FUNCTIONAL STATUS - GENERAL
DRESSING REGULAR LOWER BODY CLOTHING: TOTAL
DRESSING REGULAR UPPER BODY CLOTHING: TOTAL
STANDING UP FROM CHAIR USING ARMS: TOTAL
PERSONAL GROOMING: TOTAL
DRESSING REGULAR UPPER BODY CLOTHING: TOTAL
EATING MEALS: TOTAL
EATING MEALS: TOTAL
HELP NEEDED FOR BATHING: TOTAL
DRESSING REGULAR LOWER BODY CLOTHING: TOTAL
MOVING TO AND FROM BED TO CHAIR: TOTAL
DAILY ACTIVITIY SCORE: 6
MOVING FROM LYING ON BACK TO SITTING ON SIDE OF FLAT BED WITH BEDRAILS: A LOT
MOBILITY SCORE: 6
MOBILITY SCORE: 8
CLIMB 3 TO 5 STEPS WITH RAILING: TOTAL
TURNING FROM BACK TO SIDE WHILE IN FLAT BAD: TOTAL
DAILY ACTIVITIY SCORE: 6
MOVING FROM LYING ON BACK TO SITTING ON SIDE OF FLAT BED WITH BEDRAILS: TOTAL
CLIMB 3 TO 5 STEPS WITH RAILING: TOTAL
WALKING IN HOSPITAL ROOM: TOTAL
MOVING TO AND FROM BED TO CHAIR: TOTAL
TOILETING: TOTAL
STANDING UP FROM CHAIR USING ARMS: TOTAL
HELP NEEDED FOR BATHING: TOTAL
PERSONAL GROOMING: TOTAL
TURNING FROM BACK TO SIDE WHILE IN FLAT BAD: A LOT
WALKING IN HOSPITAL ROOM: TOTAL
TOILETING: TOTAL

## 2025-06-12 ASSESSMENT — PAIN SCALES - WONG BAKER
WONGBAKER_NUMERICALRESPONSE: HURTS WHOLE LOT
WONGBAKER_NUMERICALRESPONSE: HURTS WHOLE LOT
WONGBAKER_NUMERICALRESPONSE: HURTS LITTLE BIT
WONGBAKER_NUMERICALRESPONSE: HURTS WHOLE LOT

## 2025-06-12 ASSESSMENT — PAIN - FUNCTIONAL ASSESSMENT
PAIN_FUNCTIONAL_ASSESSMENT: WONG-BAKER FACES

## 2025-06-12 ASSESSMENT — PAIN SCALES - GENERAL: PAINLEVEL_OUTOF10: 0 - NO PAIN

## 2025-06-12 NOTE — PROGRESS NOTES
Spoke with family regarding hospice consult. Patient is not eating, is needing medication for pain, has fractured left shoulder which patient is not a candidate for surgery. Plan will most likely be inpatient hospice, family would like [facility]. Made referral, will wait for their acceptance and family meeting. CT team will continue to follow.     UPDATE:  [facility] has appointment with family tomorrow morning around 9:30. Will continue to follow.

## 2025-06-12 NOTE — PROGRESS NOTES
Occupational Therapy    OT Treatment    Patient Name: Bora Moreno  MRN: [id number]  Department: [facility]  Room: [facility]/1025-A  Today's Date: 6/12/2025  Time Calculation  Start Time: 0829  Stop Time: 0853  Time Calculation (min): 24 min        Assessment:  OT Assessment: Pt. presents with weakness, decreased trunk control and balance, and requires Max A to total assist for all BADLs and transfers. Pt. would benefit from OT to address deficits  End of Session Communication: Bedside nurse  End of Session Patient Position:  (Returned to bed.  Bed alarm on; 3 rails up.  Call light in reach and family at bedside. Pillows used for positioning and support of LUE)     Plan:  Treatment Interventions: ADL retraining, Functional transfer training, Endurance training  OT Frequency: 2 times per week  OT Discharge Recommendations: Moderate intensity level of continued care, 24 hr supervision due to cognition  OT Recommended Transfer Status: Maximum assist, Assist of 2  OT - OK to Discharge: Yes (when medically stable/cleared)  Treatment Interventions: ADL retraining, Functional transfer training, Endurance training    Subjective   OT Visit Info:     General Visit Info:  General  Reason for Referral: Dx: frequent falls resulting in non-operative left humeral fracture  Past Medical History Relevant to Rehab: PMH CAD, HTN, HLD, PVD, mitral regurg, OA, COPD, CKD stage IV, A-fib, age indeterminant L3-L5 compression fracture.  Family/Caregiver Present: Yes  Caregiver Feedback:  (Patient's spouse and daughter at bedside throughout treatment. Supportive of care)  Co-Treatment: PT  Co-Treatment Reason: To maximize pt and staff safety while completing discipline specific treatments. Pt currently requires assist x 2 for transfers and mobility.  Prior to Session Communication:  (Cleared by RN for therapy)  Patient Position Received:  (Patient observed in bed with eyes open, appearing restless and activley moving her legs towards the edge of  the bed.)  General Comment: HGB 7.1; HCT 23.4 (IV, alarm, purewick)  Precautions:  UE Weight Bearing Status:  (LUE NWB)  Medical Precautions: Fall precautions  Braces Applied:  (Left shoulder immobilizer and simple sling)  Precautions Comment: No range of motion to the left shoulder.  Okay for range of motion to elbow, wrist, and hand (Per ortho NP)  ** L UE edematous at elbow/forearm.  Elbow red and warm to the touch. RN notified.   Pain:  Pain Assessment  Pain Assessment: Harrington-Baker FACES  Harrington-Baker FACES Pain Rating: Hurts whole lot  Pain Interventions: Repositioned    Objective    Cognition:  Cognition  Overall Cognitive Status: Impaired (Patient mumbles at times, unable to understand what she is saying.)  Orientation Level: Disoriented to place, Disoriented to time, Disoriented to situation  Following Commands:  (Patient follows <10% commands)  Attention: Exceptions to WFL  Insight: Severe  Processing Speed: Delayed       Activities of Daily Living: Grooming  Grooming Comments: Max assist to wash face, hand over hand assist provided.       Bed Mobility/Transfers: Bed Mobility  Bed Mobility:  (supine<->sit: dependent    Bed pads used to manuever LE's/trunk)    Transfers  Transfer:  (standing not attempted this date secondary to poor command following)            Therapy/Activity: Balance/Neuromuscular Re-Education  Balance/Neuromuscular Re-Education Activity Performed:  (EOB sitting x 8 min with heavy anterior lean.  Resistive to readjustments to midline.  Assist needed to maintain NWB restrictions.)    Outcome Measures:[facility] Daily Activity  Putting on and taking off regular lower body clothing: Total  Bathing (including washing, rinsing, drying): Total  Putting on and taking off regular upper body clothing: Total  Toileting, which includes using toilet, bedpan or urinal: Total  Taking care of personal grooming such as brushing teeth: Total  Eating Meals: Total  Daily Activity - Total Score: 6        Education  Documentation  Precautions, taught by Jennifer L Felty, OTA at 6/12/2025  3:02 PM.  Learner: Patient  Readiness: Nonacceptance  Method: Explanation, Demonstration  Response: Needs Reinforcement, No Evidence of Learning    ADL Training, taught by Jennifer L Felty, OTA at 6/12/2025  3:01 PM.  Learner: Patient  Readiness: Acceptance  Method: Explanation, Demonstration  Response: Needs Reinforcement     EDUCATION: NWB L UE restrictions and positioning        Goals:  Encounter Problems       Encounter Problems (Active)       OT Goals       Mod A for UB dressing  (Progressing)       Start:  06/09/25    Expected End:  06/23/25            Mod A for grooming tasks and clothing mgmt  (Progressing)       Start:  06/09/25    Expected End:  06/23/25            Mod A for all functional transfers (Progressing)       Start:  06/09/25    Expected End:  06/23/25            Pt. will follow commands in 1/3 trials during ADLs/functional tasks  (Progressing)       Start:  06/09/25    Expected End:  06/23/25

## 2025-06-12 NOTE — PROGRESS NOTES
"Bora Moreno is a 89 y.o. female on day 5 of admission presenting with Frequent falls.    Subjective   Pt c/o that she has to pee and it hurts.  Otherwise does not answer any questions.    Objective     Physical Exam  GEN: awake and alert, appears uncomfortable.  RESP: even and regular    Last Recorded Vitals  Blood pressure 141/63, pulse 84, temperature 36.9 °C (98.4 °F), resp. rate 18, height 1.702 m (5' 7\"), weight (!) 44.4 kg (97 lb 14.2 oz), SpO2 98%.  Intake/Output last 3 Shifts:  I/O last 3 completed shifts:  In: 2518.8 (56.7 mL/kg) [I.V.:2318.8 (52.2 mL/kg); IV Piggyback:200]  Out: 300 (6.8 mL/kg) [Urine:300 (0.2 mL/kg/hr)]  Weight: 44.4 kg     Relevant Results  Scheduled medications  Scheduled Medications[1]  Continuous medications  Continuous Medications[2]  PRN medications  PRN Medications[3]    Results for orders placed or performed during the hospital encounter of 06/07/25 (from the past 24 hours)   Basic Metabolic Panel   Result Value Ref Range    Glucose 97 74 - 99 mg/dL    Sodium 150 (H) 136 - 145 mmol/L    Potassium 4.6 3.5 - 5.3 mmol/L    Chloride 124 (H) 98 - 107 mmol/L    Bicarbonate 15 (L) 21 - 32 mmol/L    Anion Gap 16 10 - 20 mmol/L    Urea Nitrogen 45 (H) 6 - 23 mg/dL    Creatinine 2.15 (H) 0.50 - 1.05 mg/dL    eGFR 22 (L) >60 mL/min/1.73m*2    Calcium 8.7 8.6 - 10.3 mg/dL   CBC   Result Value Ref Range    WBC 4.2 (L) 4.4 - 11.3 x10*3/uL    nRBC 0.0 0.0 - 0.0 /100 WBCs    RBC 2.21 (L) 4.00 - 5.20 x10*6/uL    Hemoglobin 7.1 (L) 12.0 - 16.0 g/dL    Hematocrit 23.4 (L) 36.0 - 46.0 %     (H) 80 - 100 fL    MCH 32.1 26.0 - 34.0 pg    MCHC 30.3 (L) 32.0 - 36.0 g/dL    RDW 19.8 (H) 11.5 - 14.5 %    Platelets 101 (L) 150 - 450 x10*3/uL   Magnesium   Result Value Ref Range    Magnesium 1.67 1.60 - 2.40 mg/dL       Assessment/Plan   -Met with pt's family.  Agreeable to hospice consult.  Unsure that they could manage her care needs at home.  He did state she did a lot better communicating " after an ice chip yesterday.  We agreed to ask speech for one last check in.      I spent 45minutes in the professional and overall care of this patient.      Ruth Ann Thao, APRN-CNP           [1] atorvastatin, 20 mg, oral, Nightly  ciprofloxacin, 200 mg, intravenous, q24h  donepezil, 10 mg, oral, Nightly  fluticasone furoate-vilanteroL, 1 puff, inhalation, Daily  lidocaine, 1 patch, transdermal, Daily  montelukast, 10 mg, oral, Daily  pantoprazole, 40 mg, oral, Daily before breakfast   Or  pantoprazole, 40 mg, intravenous, Daily before breakfast  polyethylene glycol, 17 g, oral, Daily  [Held by provider] valsartan 80 mg, hydroCHLOROthiazide 12.5 mg for Diovan HCT, , oral, Daily  [2] D5 % and 0.9 % sodium chloride, 75 mL/hr, Last Rate: 75 mL/hr (06/11/25 4024)  [3] PRN medications: acetaminophen **OR** acetaminophen **OR** acetaminophen, OLANZapine, ondansetron **OR** ondansetron, oxyCODONE

## 2025-06-12 NOTE — PROGRESS NOTES
"Daily Progress Note    Bora Moreno is a 89 y.o. female on day 5 of admission presenting with Frequent falls.    Subjective   Patient seen and examined, resting quietly in bed. Family notes she is alert today and speaking but still not at baseline. Endorses lower abd pain and urinary retention. Denies CP, SOB, n/v. Hgb continues to downtrend. Palliative following, placed inpatient hospice consult per family's request. Will ask SLP to attempt one more swallow eval.        Objective     Physical Exam  Constitutional:       Appearance: She is cachectic. She is ill-appearing.   HENT:      Head: Normocephalic.      Mouth/Throat:      Mouth: Mucous membranes are dry.   Cardiovascular:      Rate and Rhythm: Normal rate and regular rhythm.      Heart sounds: Normal heart sounds, S1 normal and S2 normal.   Pulmonary:      Effort: Pulmonary effort is normal.      Breath sounds: Normal breath sounds.   Abdominal:      Palpations: Abdomen is soft.      Tenderness: There is abdominal tenderness in the suprapubic area.   Musculoskeletal:         General: Normal range of motion.      Cervical back: Neck supple.      Right lower leg: No edema.      Left lower leg: No edema.   Skin:     General: Skin is warm.      Coloration: Skin is pale.   Neurological:      Mental Status: She is alert. She is disoriented.      Motor: Weakness present.   Psychiatric:         Behavior: Behavior is withdrawn.      Comments: Does not answer questions but will say garbled speech occasionally          Last Recorded Vitals  Blood pressure 141/63, pulse 84, temperature 36.9 °C (98.4 °F), resp. rate 18, height 1.702 m (5' 7\"), weight (!) 44.4 kg (97 lb 14.2 oz), SpO2 98%.  Intake/Output last 3 Shifts:  I/O last 3 completed shifts:  In: 2518.8 (56.7 mL/kg) [I.V.:2318.8 (52.2 mL/kg); IV Piggyback:200]  Out: 300 (6.8 mL/kg) [Urine:300 (0.2 mL/kg/hr)]  Weight: 44.4 kg     Medications  Scheduled medications  Scheduled Medications[1]  Continuous " medications  Continuous Medications[2]  PRN medications  PRN Medications[3]    Labs  CBC:   Results from last 7 days   Lab Units 06/12/25  0405 06/11/25  0407 06/10/25  0406   WBC AUTO x10*3/uL 4.2* 6.8 9.7   RBC AUTO x10*6/uL 2.21* 2.31* 2.49*   HEMOGLOBIN g/dL 7.1* 7.5* 8.1*   HEMATOCRIT % 23.4* 24.4* 26.2*   MCV fL 106* 106* 105*   MCH pg 32.1 32.5 32.5   MCHC g/dL 30.3* 30.7* 30.9*   RDW % 19.8* 19.9* 20.5*   PLATELETS AUTO x10*3/uL 101* 129* 138*     CMP:    Results from last 7 days   Lab Units 06/12/25  0405 06/11/25  0407 06/10/25  0406 06/08/25  0530 06/07/25  1053   SODIUM mmol/L 150* 146* 145   < > 138   POTASSIUM mmol/L 4.6 4.8 5.2   < > 5.2   CHLORIDE mmol/L 124* 120* 118*   < > 108*   CO2 mmol/L 15* 16* 15*   < > 18*   BUN mg/dL 45* 53* 55*   < > 53*   CREATININE mg/dL 2.15* 2.36* 2.73*   < > 2.57*   GLUCOSE mg/dL 97 111* 86   < > 110*   PROTEIN TOTAL g/dL  --   --   --   --  8.8*   CALCIUM mg/dL 8.7 8.3* 8.6   < > 8.4*   BILIRUBIN TOTAL mg/dL  --   --   --   --  0.3   ALK PHOS U/L  --   --   --   --  85   AST U/L  --   --   --   --  10   ALT U/L  --   --   --   --  8    < > = values in this interval not displayed.     BMP:    Results from last 7 days   Lab Units 06/12/25  0405 06/11/25  0407 06/10/25  0406   SODIUM mmol/L 150* 146* 145   POTASSIUM mmol/L 4.6 4.8 5.2   CHLORIDE mmol/L 124* 120* 118*   CO2 mmol/L 15* 16* 15*   BUN mg/dL 45* 53* 55*   CREATININE mg/dL 2.15* 2.36* 2.73*   CALCIUM mg/dL 8.7 8.3* 8.6   GLUCOSE mg/dL 97 111* 86     Magnesium:  Results from last 7 days   Lab Units 06/12/25  0405 06/11/25  0407 06/10/25  0406   MAGNESIUM mg/dL 1.67 1.76 1.88     Troponin:    Results from last 7 days   Lab Units 06/07/25  1153 06/07/25  1053   TROPHS ng/L 23* 26*     BNP:   Results from last 7 days   Lab Units 06/07/25  1053   BNP pg/mL 275*     Lipid Panel:  Results from last 7 days   Lab Units 06/07/25  1053   INR  1.3*   PROTIME seconds 14.0*        Nutrition             Relevant  "Results  Results from last 7 days   Lab Units 06/12/25  0405 06/11/25  0407 06/10/25  0406 06/09/25  0918 06/09/25  0414 06/08/25  0530   POCT GLUCOSE mg/dL  --   --   --  74  --   --    GLUCOSE mg/dL 97 111* 86  --  69* 94     No results found for: \"HGBA1C\"     Assessment/Plan    Mechanical fall  Left humeral fracture, noted on 6/6  Generalized weakness  - Patient presented to ED for left humerus fracture on 6/6 and discharged home with follow-up with orthopedics  - Imaging unremarkable with exception of age-indeterminate compression fracture of L3-L5  - Consult orthopedics, no interventions, recommend immobilizer and weightbearing as tolerated, no ROM to shoulder  - PT/OT eval and treat  - Optimize pain control, do not give any more narcotics  - Palliative care consult  - Hospice consult      Acute cystitis without hematuria  - UA with leukocytes and nitrites  - Urine culture with growth of Klebsiella  - Continue IV cipro BID     Chronic anemia  GI bleed?  -  notes that patient has been having black tarry stools prior to presentation  - Hemoglobin 5.4 in the ER, received 2 units PRBCs and now downtrending, 7.1 today  - Monitor H&H, transfuse for hemoglobin <7  - Monitor for overt bleeding  - GI consulted on initial admission, patient adamantly refusing any type of intervention  - PPI daily  - Avoid NSAIDs     CKD stage IV  - Baseline creatinine ~1.6 but progressively worsening  - Creatinine downtrending, likely prerenal cause 2/2 volume depletion  - Avoid nephrotoxic agents  - Continue IV fluids given n.p.o. status     Severe malnutrition secondary to chronic disease  - Nutrition consult  - Consult palliative med  - SLP eval and treat, keep n.p.o. for now     COPD, not in acute exacerbation  A-fib not on anticoagulation  HTN/HLD/CAD  Dementia  - Continue home meds as appropriate  - Zyprexa for agitation control as needed     Pain medication induced encephalopathy  - Patient was alert and talking during " initial admission, became nonverbal and encephalopathic the next day  - Patient was receiving morphine around-the-clock, will discontinue  - After discussion with attending physician, defer pain control to see if mental status returns to baseline after holding narcotics  - Unable to give Toradol due to worsening kidney function and possible GI bleed  - Mental status slowly improving after discontinuing narcotics     CODE STATUS DNR and DNI with no ICU treatment     DVTp: Hold for anemia  Plan: [facility] once medically ready, anticipate discharge within 24 to 48 hours    Jerica Pino PA-C    Plan of care was discussed extensively with patient.  Patient verbalized understanding through teach back method.  All question and concerns addressed upon examination.    Of note, this documentation is completed using the Dragon Dictation system (voice recognition software). There may be spelling and/or grammatical errors that were not corrected prior to final submission.             [1] atorvastatin, 20 mg, oral, Nightly  ciprofloxacin, 200 mg, intravenous, q24h  donepezil, 10 mg, oral, Nightly  fluticasone furoate-vilanteroL, 1 puff, inhalation, Daily  lidocaine, 1 patch, transdermal, Daily  montelukast, 10 mg, oral, Daily  pantoprazole, 40 mg, oral, Daily before breakfast   Or  pantoprazole, 40 mg, intravenous, Daily before breakfast  polyethylene glycol, 17 g, oral, Daily  [Held by provider] valsartan 80 mg, hydroCHLOROthiazide 12.5 mg for Diovan HCT, , oral, Daily  [2] D5 % and 0.9 % sodium chloride, 75 mL/hr, Last Rate: 75 mL/hr (06/11/25 6134)  [3] PRN medications: acetaminophen **OR** acetaminophen **OR** acetaminophen, OLANZapine, ondansetron **OR** ondansetron, oxyCODONE

## 2025-06-12 NOTE — PROGRESS NOTES
Physical Therapy    Physical Therapy Treatment    Patient Name: Bora Moreno  MRN: [id number]  Today's Date: 6/12/2025  Time Calculation  Start Time: 0830  Stop Time: 0854  Time Calculation (min): 24 min     1025/1025-A    Assessment/Plan   Barriers to Discharge Home: Caregiver assistance, Physical needs,   Cognition needs    Caregiver Assistance: Caregiver assistance needed per identified barriers - however, level of patient's required assistance exceeds assistance available at home    Physical Needs: Ambulating household distances limited by function/safety, 24hr ADL assistance needed, High falls risk due to function or environment    End of Session Communication: Bedside nurse    Assessment Comment: Patient currently dependent with all functional mobility compared to baseline. Anticipate slow progress towards goals.    End of Session Patient Position:  (Returned to bed.  Bed alarm on; 3 rails up.  Call light in reach and family at bedside. Pillows used for positioning and support of LUE)    PT Plan  Inpatient/Swing Bed or Outpatient: Inpatient  Treatment/Interventions: Bed mobility, Transfer training, Gait training, Balance training, Strengthening, Endurance training, Therapeutic exercise, Therapeutic activity, Home exercise program  PT Plan: Ongoing PT  PT Frequency: 3 times per week  PT Discharge Recommendations: Moderate intensity level of continued care    PT Recommended Transfer Status: Assist x2    General Visit Information:   PT  Visit  PT Received On: 06/12/25    General  Family/Caregiver Present: Yes  Caregiver Feedback:  (Patient's spouse and daughter at bedside throughout treatment. Supportive of care)  Co-Treatment: OT  Co-Treatment Reason:  (co-treatment performed with OT to maximize patient safety and function. Patient currently requires assist of two for mobility/transfers. One unit billed for PT)  Prior to Session Communication:  (Cleared by RN for PT)  Patient Position Received:  (Patient  observed in bed with eyes open, appearing restless and activley moving her legs towards the edge of the bed.)    General Comment:  (Dx: frequent falls resulting in non-operative left humeral fracture)    General Observations:   General Observation:  (IV; purwick; alarm    LUE edamtous, discolored in appearance and warm to the touch  Patient's code status changed to DNR)    Precautions:  Precautions  UE Weight Bearing Status:  (LUE NWB)  Medical Precautions: Fall precautions  Braces Applied:  (Left shoulder immobilizer and simple sling)  Precautions Comment: No range of motion to the left shoulder.  Okay for range of motion to elbow, wrist, and hand (Per ortho NP)      Pain:  Pain Assessment  Pain Assessment: Harrington-Baker FACES  Harrington-Baker FACES Pain Rating: Hurts whole lot  Pain Location:  (left UE)  Pain Interventions:  (positioning)    Cognition:  Cognition  Overall Cognitive Status: Impaired (Patient mumbles at times, unable to understand what she is saying.)  Orientation Level: Disoriented to place, Disoriented to time, Disoriented to situation  Following Commands:  (Patient follows <10% commands)  Attention:  (impaired)  Insight: Severe      Balance:   Static Sitting Balance  Static Sitting-Balance Support:  (bilat feet supported)  Static Sitting-Level of Assistance:  (Poor static sit. Paitent with heavy anterior lean. Resisitive to corrections. Leans fwd into her LUE and presses it down into her lap.  Constant max assist to bring trunk into upright position in order to prevent WB through LUE.)  Static Sitting-Comment/Number of Minutes:  (Patient sat EOB x8 minutes)    Dynamic Sitting Balance  Dynamic Sitting-Balance:  (poor dynamic sit)      Activity Tolerance:  Activity Tolerance  Endurance:  (Poor tolerance to activity)    Treatments:  Therapeutic Exercise  Therapeutic Exercise Performed:  (Attempted LE ther-ex while sitting EOB.  Patient would not follow any commands (verbal or tactile) in order to  participate with ex's.)        Bed Mobility  Bed Mobility:  (supine<->sit: dependent    Bed pads used to manuever LE's/trunk)     Transfers  Transfer:  (standing not attempted this date secondary to poor command following)           Outcome Measures:   [facility] Basic Mobility  Turning from your back to your side while in a flat bed without using bedrails: Total  Moving from lying on your back to sitting on the side of a flat bed without using bedrails: Total  Moving to and from bed to chair (including a wheelchair): Total  Standing up from a chair using your arms (e.g. wheelchair or bedside chair): Total  To walk in hospital room: Total  Climbing 3-5 steps with railing: Total  Basic Mobility - Total Score: 6        Education Documentation  Mobility Training, taught by Milady Posada PTA at 6/12/2025  2:00 PM.  Learner: Patient  Readiness: Nonacceptance  Method: Explanation, Demonstration  Response: No Evidence of Learning          Encounter Problems       Encounter Problems (Active)       PT Problem       Pt. will perform bed mobility with mod A x 1  (Progressing)       Start:  06/09/25    Expected End:  06/23/25            Pt. will perform transfers with LRD with mod A x 1  (Not Progressing)       Start:  06/09/25    Expected End:  06/23/25            Pt. will require SBA for static/dynamic sitting balance to safely participate in ADLs  (Progressing)       Start:  06/09/25    Expected End:  06/23/25            Pt. will participate in BLE strengthening HEP  (Not Progressing)       Start:  06/09/25    Expected End:  06/23/25               Pain - Adult

## 2025-06-12 NOTE — PROGRESS NOTES
"Nutrition Follow Up Assessment:   Nutrition Assessment         Patient is a 89 y.o. female presenting with frequent falls  PMH CAD, HTN, HLD, PVD, mitral regurg, JAMAR, COPD, CKD stage IV, anemia, and A-fib not on OAC who is presenting with weakness status post fall.       Nutrition History:  Energy Intake:  (no meal intakes recorded, now NPO)  Pain affecting nutrition status: N/A  Food and Nutrient History: RDN follow up. SLP eval was completed, recommendation for NPO status. Per RN pt not alert enough for po intake at this time. Pall Care following, referral made to hospice. Aggressive nutrition intervention not warranted at this time, may continue to see poor intake and wt loss as condition declines, will provide comfort care per family wishes. Will continue to monitor and follow for any changes in plan of care that would affect nutrition status.       Anthropometrics:  Height: 170.2 cm (5' 7\")   Weight: (!) 44.4 kg (97 lb 14.2 oz)   BMI (Calculated): 15.33  IBW/kg (Dietitian Calculated): 61.4 kg  Percent of IBW: 46 %                      Weight History:   06/07/25 (!) 44.4 kg (97 lb 14.2 oz)   06/06/25 47.6 kg (105 lb) - stated   05/02/25 (!) 44.5 kg (98 lb 3.2 oz)   02/19/25 (!) 45 kg (99 lb 1.6 oz)   01/03/25 45.7 kg (100 lb 12.8 oz)   10/01/24 48.3 kg (106 lb 6.4 oz)   09/16/24 51.3 kg (113 lb)   07/01/24 49.9 kg (110 lb)   03/29/24 51.3 kg (113 lb 3.2 oz)   02/21/24 50.6 kg (111 lb 8 oz)     Weight Change %:  Weight History / % Weight Change: no new wt to assess  Significant Weight Loss: No    Nutrition Focused Physical Exam Findings:  See 6/9 assessment  Subcutaneous Fat Loss:      Muscle Wasting:     Edema:  Edema: +3 moderate  Edema Location: LUE  Physical Findings:  Skin: Negative  Mouth Findings: Dysphagia    Nutrition Significant Labs:  BMP Trend:   Results from last 7 days   Lab Units 06/12/25  0405 06/11/25  0407 06/10/25  0406 06/09/25  0414   GLUCOSE mg/dL 97 111* 86 69*   CALCIUM mg/dL 8.7 8.3* 8.6 " "8.4*   SODIUM mmol/L 150* 146* 145 142   POTASSIUM mmol/L 4.6 4.8 5.2 5.2   CO2 mmol/L 15* 16* 15* 14*   CHLORIDE mmol/L 124* 120* 118* 114*   BUN mg/dL 45* 53* 55* 47*   CREATININE mg/dL 2.15* 2.36* 2.73* 2.47*    , A1C:No results found for: \"HGBA1C\", BG POCT trend:   Results from last 7 days   Lab Units 06/09/25  0918   POCT GLUCOSE mg/dL 74        Nutrition Specific Medications:  Scheduled medications  Scheduled Medications[1]  Continuous medications  Continuous Medications[2]  PRN medications  PRN Medications[3]     I/O:   Last BM Date: 06/05/25;      Dietary Orders (From admission, onward)       Start     Ordered    06/11/25 1343  NPO Diet; Effective now  Diet effective now        Comments: Oral care as tolerated    06/11/25 1343    06/10/25 1107  May Participate in Room Service With Assistance  ( ROOM SERVICE MAY PARTICIPATE WITH ASSISTANCE)  Once        Question:  .  Answer:  Yes    06/10/25 1106                     Estimated Needs:   Total Energy Estimated Needs in 24 hours (kCal): 1554 kCal  Method for Estimating Needs: 35 kcal/kg ABW  Total Protein Estimated Needs in 24 Hours (g): 53 g  Method for Estimating 24 Hour Protein Needs: 1.2 g/kg ABW or as renal function allows  Total Fluid Estimated Needs in 24 Hours (mL): 1554 mL  Method for Estimating 24 Hour Fluid Needs: 1 ml/kcal or per MD  Patient on Order Fluid Restriction: No        Nutrition Diagnosis   Malnutrition Diagnosis  Patient has Malnutrition Diagnosis: Yes  Diagnosis Status: Active  Malnutrition Diagnosis: Severe malnutrition related to chronic disease or condition  Related to: COPD with other chronic conditions  As Evidenced by: severe muscle wasting, severe fat loss, suspect pt meeting <75% of estimated energy needs            Nutrition Interventions/Recommendations   Nutrition prescription for oral nutrition    Nutrition Recommendations:  Individualized Nutrition Prescription Provided for : Resume Regular diet as appropriate for comfort, " ONS with pending diet advancement and pt/family wishes    Nutrition Interventions/Goals:   Meals and Snacks: General healthful diet  Goal: Consumes 3 meals per day  Coordination of Care with Providers: Nursing (Pall Care NP)      Education Documentation  No documentation found.    No needs at this time        Nutrition Monitoring and Evaluation   Estimated Energy Intake: Energy intake 50 -75% of estimated energy needs  Intake / Amount of food: Consumes at least 50% or more of meals/snacks/supplements  Additional Plans: Resume diet as appropriate    Body Weight: Body weight - Maintain stable weight    Electrolyte and Renal Panel: Electrolytes within normal limits  Glucose/Endocrine Profile: Glucose within normal limits ( mg/dL)         Goal Status: Some digression away from goal(s)    Time Spent (min): 30 minutes            [1] atorvastatin, 20 mg, oral, Nightly  ciprofloxacin, 200 mg, intravenous, q24h  donepezil, 10 mg, oral, Nightly  fluticasone furoate-vilanteroL, 1 puff, inhalation, Daily  lidocaine, 1 patch, transdermal, Daily  montelukast, 10 mg, oral, Daily  pantoprazole, 40 mg, oral, Daily before breakfast   Or  pantoprazole, 40 mg, intravenous, Daily before breakfast  polyethylene glycol, 17 g, oral, Daily  [Held by provider] valsartan 80 mg, hydroCHLOROthiazide 12.5 mg for Diovan HCT, , oral, Daily  [2] D5 % and 0.9 % sodium chloride, 75 mL/hr, Last Rate: 75 mL/hr (06/12/25 1314)  [3] PRN medications: acetaminophen **OR** acetaminophen **OR** acetaminophen, OLANZapine, ondansetron **OR** ondansetron, oxyCODONE

## 2025-06-12 NOTE — CARE PLAN
The patient's goals for the shift include remain safe and manage pain    The clinical goals for the shift include Remain safe and free from injury      Problem: Pain - Adult  Goal: Verbalizes/displays adequate comfort level or baseline comfort level  Outcome: Progressing     Problem: Safety - Adult  Goal: Free from fall injury  Outcome: Progressing     Problem: Skin  Goal: Prevent/minimize sheer/friction injuries  Outcome: Progressing  Flowsheets (Taken 6/12/2025 1313)  Prevent/minimize sheer/friction injuries:   Turn/reposition every 2 hours/use positioning/transfer devices   Use pull sheet

## 2025-06-13 VITALS
HEART RATE: 97 BPM | BODY MASS INDEX: 15.36 KG/M2 | WEIGHT: 97.88 LBS | DIASTOLIC BLOOD PRESSURE: 77 MMHG | TEMPERATURE: 98.1 F | RESPIRATION RATE: 16 BRPM | OXYGEN SATURATION: 99 % | HEIGHT: 67 IN | SYSTOLIC BLOOD PRESSURE: 166 MMHG

## 2025-06-13 LAB
ANION GAP SERPL CALC-SCNC: 11 MMOL/L (ref 10–20)
BUN SERPL-MCNC: 36 MG/DL (ref 6–23)
CALCIUM SERPL-MCNC: 8.9 MG/DL (ref 8.6–10.3)
CHLORIDE SERPL-SCNC: 127 MMOL/L (ref 98–107)
CO2 SERPL-SCNC: 19 MMOL/L (ref 21–32)
CREAT SERPL-MCNC: 1.83 MG/DL (ref 0.5–1.05)
EGFRCR SERPLBLD CKD-EPI 2021: 26 ML/MIN/1.73M*2
ERYTHROCYTE [DISTWIDTH] IN BLOOD BY AUTOMATED COUNT: 19.6 % (ref 11.5–14.5)
GLUCOSE SERPL-MCNC: 101 MG/DL (ref 74–99)
HCT VFR BLD AUTO: 25.8 % (ref 36–46)
HGB BLD-MCNC: 7.8 G/DL (ref 12–16)
MAGNESIUM SERPL-MCNC: 1.62 MG/DL (ref 1.6–2.4)
MCH RBC QN AUTO: 32.4 PG (ref 26–34)
MCHC RBC AUTO-ENTMCNC: 30.2 G/DL (ref 32–36)
MCV RBC AUTO: 107 FL (ref 80–100)
NRBC BLD-RTO: 0 /100 WBCS (ref 0–0)
PLATELET # BLD AUTO: 102 X10*3/UL (ref 150–450)
POTASSIUM SERPL-SCNC: 4.3 MMOL/L (ref 3.5–5.3)
RBC # BLD AUTO: 2.41 X10*6/UL (ref 4–5.2)
SODIUM SERPL-SCNC: 153 MMOL/L (ref 136–145)
WBC # BLD AUTO: 3.6 X10*3/UL (ref 4.4–11.3)

## 2025-06-13 PROCEDURE — 99239 HOSP IP/OBS DSCHRG MGMT >30: CPT

## 2025-06-13 PROCEDURE — 84520 ASSAY OF UREA NITROGEN: CPT

## 2025-06-13 PROCEDURE — 92526 ORAL FUNCTION THERAPY: CPT | Mod: GN

## 2025-06-13 PROCEDURE — 83735 ASSAY OF MAGNESIUM: CPT

## 2025-06-13 PROCEDURE — 2500000004 HC RX 250 GENERAL PHARMACY W/ HCPCS (ALT 636 FOR OP/ED)

## 2025-06-13 PROCEDURE — 85027 COMPLETE CBC AUTOMATED: CPT

## 2025-06-13 PROCEDURE — 36415 COLL VENOUS BLD VENIPUNCTURE: CPT

## 2025-06-13 PROCEDURE — 2500000005 HC RX 250 GENERAL PHARMACY W/O HCPCS: Performed by: NURSE PRACTITIONER

## 2025-06-13 RX ORDER — CIPROFLOXACIN 250 MG/1
250 TABLET, FILM COATED ORAL 2 TIMES DAILY
Qty: 6 TABLET | Refills: 0 | Status: SHIPPED | OUTPATIENT
Start: 2025-06-13 | End: 2025-06-16

## 2025-06-13 RX ADMIN — LIDOCAINE 1 PATCH: 4 PATCH TOPICAL at 08:49

## 2025-06-13 RX ADMIN — PANTOPRAZOLE SODIUM 40 MG: 40 INJECTION, POWDER, LYOPHILIZED, FOR SOLUTION INTRAVENOUS at 06:08

## 2025-06-13 RX ADMIN — CIPROFLOXACIN 200 MG: 200 INJECTION, SOLUTION INTRAVENOUS at 08:50

## 2025-06-13 RX ADMIN — HYDROMORPHONE HYDROCHLORIDE 0.2 MG: 0.2 INJECTION, SOLUTION INTRAMUSCULAR; INTRAVENOUS; SUBCUTANEOUS at 11:58

## 2025-06-13 NOTE — PROGRESS NOTES
Patient will be discharging with [facility] to their [place] inpatient unit. Transport set up for 2:30 pickup.

## 2025-06-13 NOTE — DISCHARGE SUMMARY
Discharge Diagnosis  Frequent falls  Acute cystitis without hematuria  Left humerus fracture  Possible GI bleed      Malnutrition Diagnosis Status: Active  Malnutrition Diagnosis: Severe malnutrition related to chronic disease or condition  Related to: COPD with other chronic conditions  As Evidenced by: severe muscle wasting, severe fat loss, suspect pt meeting <75% of estimated energy needs  I agree with the dietitian's malnutrition diagnosis.        Issues Requiring Follow-Up  None    Discharge Meds     Medication List      PAUSE taking these medications     valsartan-hydrochlorothiazide 80-12.5 mg tablet; Wait to take this until   your doctor or other care provider tells you to start again.; Commonly   known as: Diovan-HCT; Take 1 tablet by mouth once daily.     START taking these medications     ciprofloxacin 250 mg tablet; Commonly known as: Cipro; Take 1 tablet   (250 mg) by mouth 2 times a day for 3 days.     CONTINUE taking these medications     aspirin 81 mg EC tablet   atorvastatin 20 mg tablet; Commonly known as: Lipitor; TAKE 1 TABLET   DAILY AT BEDTIME   Breo Ellipta 200-25 mcg/dose inhaler; Generic drug: fluticasone   furoate-vilanteroL; USE 1 INHALATION DAILY   donepezil 10 mg tablet; Commonly known as: Aricept; Take 1 tablet (10   mg) by mouth once daily at bedtime.   memantine 5 mg tablet; Commonly known as: Namenda; Take 1 tablet (5 mg)   by mouth 2 times a day.   montelukast 10 mg tablet; Commonly known as: Singulair; TAKE 1 TABLET   DAILY     STOP taking these medications     HYDROcodone-acetaminophen 5-325 mg tablet; Commonly known as: Norco       Test Results Pending At Discharge  Pending Labs       Order Current Status    Extra Tubes In process    Extra Tubes In process    PST Top In process    SST TOP In process            Hospital Course   This is an 89-year-old female with past medical history including CAD, HTN, HLD, PVD, mitral regurg, JAMAR, COPD, CKD stage IV, anemia, and A-fib not on  OAC who presented on 6/7 for generalized weakness s/p mechanical fall resulting in left humerus fracture.  Ortho consulted, no acute interventions necessary.  Recommended immobilizer and WBAT without ROM to left shoulder.  Upon arrival to the ED, hemoglobin was 5.6, treated with 2 units PRBCs with some improvement.  Baseline hemoglobin ~8.0.  GI consulted, not a good candidate for EGD/colonoscopy and patient adamantly refused any intervention.  Started on PPI, she should avoid NSAIDs.  Labs also revealed acute renal insufficiency on CKD with creatinine elevated above baseline.  Patient treated with IV fluids with continued improvement of creatinine.  UA showed leukocytes and nitrites, urine culture with growth of Klebsiella.  Initially on IV Rocephin but switched to IV ciprofloxacin based on sensitivities.  She will be given a prescription for p.o. ciprofloxacin twice daily x 3 days.  During admission, patient's pain was controlled with morphine which caused pain medication induced encephalopathy.  After discontinuing IV morphine, the patient's mental state slowly improved.  Due to altered mental status, the patient was made n.p.o. to prevent aspiration.  Nutrition consulted, patient remains n.p.o. due to safety concerns of possible aspiration.  Patient did not contribute in swallow evaluation.  Ultimately palliative medicine was consulted and patient's CODE STATUS was changed to DNR/DNI and no ICU level care.  After long discussion, the patient's family decided to consult hospice due to continued altered mental state and malnutrition secondary to poor intake.  The patient will be discharged to hospice of [facility] for inpatient hospice care.  The patient is hemodynamically stable at time of discharge.    Plan of care was discussed extensively with patient.  Patient verbalized understanding through teach back method.  All question and concerns addressed upon examination.     Of note, this  documentation is completed using the Dragon Dictation system (voice recognition software). There may be spelling and/or grammatical errors that were not corrected prior to final submission.      Pertinent Physical Exam At Time of Discharge  Physical Exam  Constitutional:       Appearance: She is cachectic. She is ill-appearing.   HENT:      Head: Normocephalic.      Mouth/Throat:      Mouth: Mucous membranes are dry.   Cardiovascular:      Rate and Rhythm: Normal rate and regular rhythm.      Heart sounds: Normal heart sounds, S1 normal and S2 normal.   Pulmonary:      Effort: Pulmonary effort is normal.      Breath sounds: Normal breath sounds.   Abdominal:      Palpations: Abdomen is soft.      Tenderness: There is abdominal tenderness in the suprapubic area.   Musculoskeletal:         General: Normal range of motion.      Cervical back: Neck supple.      Right lower leg: No edema.      Left lower leg: No edema.   Skin:     General: Skin is warm.      Coloration: Skin is pale.   Neurological:      Mental Status: She is alert. She is disoriented.      Motor: Weakness present.   Psychiatric:         Behavior: Behavior is withdrawn.      Comments: Does not answer questions but will say garbled speech occasionally      Outpatient Follow-Up  Future Appointments   Date Time Provider Department Center   8/4/2025 11:15 AM Jermain Jones MD [id number] [place]   2/5/2026  1:30 PM [id number] ECHO/VASC 4 [id number] [facility]   2/18/2026 11:00 AM Karan Ashton DO [id number] [place]         Jerica Pino PA-C    I spent 35 minutes on discharge. Time calculated includes bedside evaluation, counseling, and outpatient care coordination.

## 2025-06-13 NOTE — PROGRESS NOTES
Inpatient Speech Language Pathology Treatment Note     Patient Name: Bora Moreno  MRN: [id number]  : 1935  Patient Room: [address] [place] [address]  Today's Date: 25  Time Calculation  Start Time: 1150  Stop Time: 1205  Time Calculation (min): 15 min     PLAN:  Skilled speech therapy for dysphagia treatment continues to be warranted for pt/caregiver education in order to reduce risk of aspiration, dehydration and malnutrition. , to determine ability to upgrade diet after PO trials with SLP  SLP TX Plan: Discharge from Speech Therapy  SLP Frequency: Follow-up visit only  Duration: 1 week  Discussed POC: Patient, Caregiver/family, Nursing  Discussed Risks/Benefits: Patient, Caregiver/Family, Nursing  Patient/Caregiver Agreeable: Yes  Continue skilled SLP at next level of care: unable to determine at this time     Recommendations:   Solid Diet Recommendations: NPO  Liquid Diet Recommendations: NPO  Medication administration: Non-oral     SLP Assessment:  Patient was seen at bedside x 2 today.  Initially patient was awake and alert however combative and uncooperative.  Family has decided to transition patient to hospice and will be leaving the hospital within 30 minutes per nursing.  Spoke with family regarding comfort foods for during second visit.  Recommended that if patient is awake and alert and requesting food or liquids that she be able to eat and drink what ever items she requests.  He is in agreement with this plan.  Also demonstrated oral care to family previously and encouraged them to complete oral care when patient will let them to improve her comfort.  Discharge speech therapy at this time.      Subjective:  Pt. Seen at bedside for skilled dysphagia treatment.   Prior to Session Communication: Bedside nurse    Pain:  Ratin-10   Pt report: 0   Action taken: none needed       Oxygen Status:   nasal cannula        Goals:   Goals established on 06/10/25   Duration: 30 days  - Patient will tolerate  therapeutic trials without overt s/s of aspiration in 90 % of PO trials with max cueing.     Treatment Outcome:  SLP TX Intervention Outcome: Making Progress Towards Goals    Treatment Tolerance: Patient limited by fatigue   Prognosis: Poor   Barriers: Cognition, Motivation, Medication Status, Comorbidities   Medical Staff Made Aware: Yes     SLP Inpatient Education:  Learner family, patient   Barriers to Learning None - family    Method Verbal   Education - Topic ST provided patient education regarding role of ST, purpose of treatment, clinical impressions, recommended diet     Outcome    1= partially meets; needs review

## 2025-06-13 NOTE — PROGRESS NOTES
Inpatient Speech Language Pathology Treatment Note     Patient Name: Bora Moreno  MRN: [id number]  : 1935  Patient Room: [address] [place] [address]  Today's Date: 25  Time Calculation  Start Time: 1300  Stop Time: 1325  Time Calculation (min): 25 min         PLAN:  Skilled speech therapy for dysphagia treatment continues to be warranted for pt/caregiver education in order to reduce risk of aspiration, dehydration and malnutrition. , to determine ability to upgrade diet after PO trials with SLP  SLP TX Plan: Continue Plan of Care  SLP Frequency: Follow-up visit only  Duration: 1 week  Discussed POC: Patient, Caregiver/family, Nursing  Discussed Risks/Benefits: Patient, Caregiver/Family, Nursing, Yes  Patient/Caregiver Agreeable: Yes  Continue skilled SLP at next level of care: unable to determine at this time     Recommendations:   Solid Diet Recommendations: NPO  Liquid Diet Recommendations: NPO  Medication administration: Non-oral     SLP Assessment:  Patient was seen at bedside with her daughter and  in her room.  Patient continues to have poor level of alertness.  She was arousable to verbal and tactile stimulation.  Patient did have appropriate responses to clinicians questions with a yes response and laughing appropriately when family was joking with patient.  She was able to tolerate oral care well.  Patient had extensive dry thick secretions on her upper and lower lips.  She had mild oral residual within oral cavity.  After 15 minutes of oral care patient's lips and oral cavity were clean and clear.  Oral care techniques were taught to patient's daughter and .  To trial of ice chips with patient.  Patient had no evident attempt to swallow and did not respond with oral movement of tongue with ice chip.  No coughing was seen during ice chip trials.  Recommended to family that patient remain n.p.o. at this time and that she be reassessed if she has an improved level of alertness.  Patient/family  in agreement with this recommendation.  Informed nurse of recommendation.  Patient also has a palliative/hospice consult in place.      Subjective:  Pt. Seen at bedside for skilled dysphagia treatment.   Prior to Session Communication: Bedside nurse  Pain:  Ratin-10   Pt report: 0   Action taken: none needed         Oxygen Status:   nasal cannula             Goals:   Goals established on 06/10/25   Duration: 30 days  - Patient will tolerate therapeutic trials without overt s/s of aspiration in 90 % of PO trials with max cueing.         Treatment Outcome:  SLP TX Intervention Outcome: No Progress Made    Treatment Tolerance: Patient limited by fatigue   Prognosis: Poor   Barriers: Cognition, Comorbidities, Medication Status   Medical Staff Made Aware: Yes         SLP Inpatient Education:  Learner family, patient   Barriers to Learning None - family    Method Verbal   Education - Topic ST provided patient education regarding role of ST, purpose of treatment, clinical impressions, recommended diet     Outcome    1= partially meets; needs review

## 2025-06-14 LAB
ATRIAL RATE: 100 BPM
P AXIS: 78 DEGREES
P OFFSET: 210 MS
P ONSET: 143 MS
PR INTERVAL: 152 MS
Q ONSET: 219 MS
QRS COUNT: 17 BEATS
QRS DURATION: 86 MS
QT INTERVAL: 338 MS
QTC CALCULATION(BAZETT): 436 MS
QTC FREDERICIA: 401 MS
R AXIS: 61 DEGREES
T AXIS: 69 DEGREES
T OFFSET: 388 MS
VENTRICULAR RATE: 100 BPM

## 2025-06-16 LAB
HOLD SPECIMEN: NORMAL
HOLD SPECIMEN: NORMAL

## 2025-06-29 LAB
ATRIAL RATE: 89 BPM
P AXIS: 76 DEGREES
P OFFSET: 206 MS
P ONSET: 149 MS
PR INTERVAL: 134 MS
Q ONSET: 216 MS
QRS COUNT: 15 BEATS
QRS DURATION: 88 MS
QT INTERVAL: 378 MS
QTC CALCULATION(BAZETT): 459 MS
QTC FREDERICIA: 430 MS
R AXIS: 66 DEGREES
T AXIS: 67 DEGREES
T OFFSET: 405 MS
VENTRICULAR RATE: 89 BPM

## 2025-08-04 ENCOUNTER — APPOINTMENT (OUTPATIENT)
Dept: PRIMARY CARE | Facility: CLINIC | Age: 89
End: 2025-08-04
Payer: MEDICARE

## 2026-02-05 ENCOUNTER — APPOINTMENT (OUTPATIENT)
Dept: CARDIOLOGY | Facility: CLINIC | Age: 89
End: 2026-02-05
Payer: MEDICARE

## 2026-02-18 ENCOUNTER — APPOINTMENT (OUTPATIENT)
Dept: CARDIOLOGY | Facility: CLINIC | Age: 89
End: 2026-02-18
Payer: MEDICARE